# Patient Record
Sex: MALE | Race: WHITE | Employment: OTHER | URBAN - METROPOLITAN AREA
[De-identification: names, ages, dates, MRNs, and addresses within clinical notes are randomized per-mention and may not be internally consistent; named-entity substitution may affect disease eponyms.]

---

## 2017-02-17 ENCOUNTER — HOSPITAL ENCOUNTER (OUTPATIENT)
Dept: CT IMAGING | Age: 72
Discharge: HOME | End: 2017-02-17
Attending: INTERNAL MEDICINE | Admitting: INTERNAL MEDICINE

## 2017-02-17 DIAGNOSIS — C85.80 OTHER SPECIFIED TYPES OF NON-HODGKIN LYMPHOMA, UNSPECIFIED SITE (HCC): ICD-10-CM

## 2017-02-17 DIAGNOSIS — Z85.72 HISTORY OF NON-HODGKIN'S LYMPHOMA: ICD-10-CM

## 2017-02-17 LAB
A/G RATIO: 1.7 (ref 1.1–2.2)
ALBUMIN SERPL-MCNC: 4.3 G/DL (ref 3.4–5)
ALP BLD-CCNC: 111 U/L (ref 40–129)
ALT SERPL-CCNC: 42 U/L (ref 10–40)
ANION GAP SERPL CALCULATED.3IONS-SCNC: 11 MMOL/L (ref 3–16)
AST SERPL-CCNC: 29 U/L (ref 15–37)
BILIRUB SERPL-MCNC: 0.8 MG/DL (ref 0–1)
BUN BLDV-MCNC: 12 MG/DL (ref 7–20)
CALCIUM SERPL-MCNC: 9.4 MG/DL (ref 8.3–10.6)
CHLORIDE BLD-SCNC: 93 MMOL/L (ref 99–110)
CO2: 30 MMOL/L (ref 21–32)
CREAT SERPL-MCNC: 1 MG/DL (ref 0.8–1.3)
GFR AFRICAN AMERICAN: >60
GFR NON-AFRICAN AMERICAN: >60
GLOBULIN: 2.6 G/DL
GLUCOSE BLD-MCNC: 268 MG/DL (ref 70–99)
POTASSIUM SERPL-SCNC: 4.6 MMOL/L (ref 3.5–5.1)
SODIUM BLD-SCNC: 134 MMOL/L (ref 136–145)
TOTAL PROTEIN: 6.9 G/DL (ref 6.4–8.2)

## 2018-12-26 ENCOUNTER — HOSPITAL ENCOUNTER (EMERGENCY)
Age: 73
Discharge: HOME | End: 2018-12-26
Payer: MEDICARE

## 2018-12-26 ENCOUNTER — APPOINTMENT (OUTPATIENT)
Dept: GENERAL RADIOLOGY | Age: 73
End: 2018-12-26
Payer: MEDICARE

## 2018-12-26 VITALS
SYSTOLIC BLOOD PRESSURE: 147 MMHG | OXYGEN SATURATION: 96 % | HEIGHT: 70 IN | BODY MASS INDEX: 27.63 KG/M2 | TEMPERATURE: 98.5 F | HEART RATE: 92 BPM | RESPIRATION RATE: 16 BRPM | WEIGHT: 193 LBS | DIASTOLIC BLOOD PRESSURE: 77 MMHG

## 2018-12-26 DIAGNOSIS — T18.9XXA SWALLOWED FOREIGN BODY, INITIAL ENCOUNTER: Primary | ICD-10-CM

## 2018-12-26 PROCEDURE — 99283 EMERGENCY DEPT VISIT LOW MDM: CPT

## 2018-12-26 PROCEDURE — 74019 RADEX ABDOMEN 2 VIEWS: CPT

## 2018-12-26 RX ORDER — CLOPIDOGREL BISULFATE 75 MG/1
75 TABLET ORAL DAILY
COMMUNITY
End: 2022-09-14 | Stop reason: ALTCHOICE

## 2022-07-25 ENCOUNTER — HOSPITAL ENCOUNTER (EMERGENCY)
Age: 77
Discharge: HOME | End: 2022-07-25
Attending: STUDENT IN AN ORGANIZED HEALTH CARE EDUCATION/TRAINING PROGRAM
Payer: MEDICARE

## 2022-07-25 ENCOUNTER — APPOINTMENT (OUTPATIENT)
Dept: CT IMAGING | Age: 77
End: 2022-07-25
Payer: MEDICARE

## 2022-07-25 VITALS
WEIGHT: 193 LBS | DIASTOLIC BLOOD PRESSURE: 59 MMHG | RESPIRATION RATE: 17 BRPM | BODY MASS INDEX: 27.63 KG/M2 | TEMPERATURE: 97.8 F | HEIGHT: 70 IN | SYSTOLIC BLOOD PRESSURE: 130 MMHG | OXYGEN SATURATION: 96 % | HEART RATE: 76 BPM

## 2022-07-25 DIAGNOSIS — R19.7 DIARRHEA, UNSPECIFIED TYPE: ICD-10-CM

## 2022-07-25 DIAGNOSIS — R10.33 PERIUMBILICAL ABDOMINAL PAIN: Primary | ICD-10-CM

## 2022-07-25 DIAGNOSIS — K52.9 GASTROENTERITIS: ICD-10-CM

## 2022-07-25 LAB
A/G RATIO: 1.6 (ref 1.1–2.2)
ALBUMIN SERPL-MCNC: 4.4 G/DL (ref 3.4–5)
ALP BLD-CCNC: 92 U/L (ref 40–129)
ALT SERPL-CCNC: 19 U/L (ref 10–40)
ANION GAP SERPL CALCULATED.3IONS-SCNC: 9 MMOL/L (ref 3–16)
AST SERPL-CCNC: 22 U/L (ref 15–37)
BASOPHILS ABSOLUTE: 0 K/UL (ref 0–0.2)
BASOPHILS RELATIVE PERCENT: 0.4 %
BILIRUB SERPL-MCNC: 0.7 MG/DL (ref 0–1)
BILIRUBIN URINE: NEGATIVE
BLOOD, URINE: NEGATIVE
BUN BLDV-MCNC: 18 MG/DL (ref 7–20)
CALCIUM SERPL-MCNC: 9.3 MG/DL (ref 8.3–10.6)
CHLORIDE BLD-SCNC: 97 MMOL/L (ref 99–110)
CLARITY: CLEAR
CO2: 29 MMOL/L (ref 21–32)
COLOR: YELLOW
CREAT SERPL-MCNC: 0.8 MG/DL (ref 0.8–1.3)
EOSINOPHILS ABSOLUTE: 0.1 K/UL (ref 0–0.6)
EOSINOPHILS RELATIVE PERCENT: 0.8 %
GFR AFRICAN AMERICAN: >60
GFR NON-AFRICAN AMERICAN: >60
GLUCOSE BLD-MCNC: 154 MG/DL (ref 70–99)
GLUCOSE URINE: >=1000 MG/DL
HCT VFR BLD CALC: 49.9 % (ref 40.5–52.5)
HEMOGLOBIN: 16.9 G/DL (ref 13.5–17.5)
KETONES, URINE: NEGATIVE MG/DL
LEUKOCYTE ESTERASE, URINE: NEGATIVE
LIPASE: 32 U/L (ref 13–60)
LYMPHOCYTES ABSOLUTE: 0.2 K/UL (ref 1–5.1)
LYMPHOCYTES RELATIVE PERCENT: 2 %
MCH RBC QN AUTO: 31.6 PG (ref 26–34)
MCHC RBC AUTO-ENTMCNC: 33.8 G/DL (ref 31–36)
MCV RBC AUTO: 93.4 FL (ref 80–100)
MICROSCOPIC EXAMINATION: ABNORMAL
MONOCYTES ABSOLUTE: 0.9 K/UL (ref 0–1.3)
MONOCYTES RELATIVE PERCENT: 7.5 %
NEUTROPHILS ABSOLUTE: 10.7 K/UL (ref 1.7–7.7)
NEUTROPHILS RELATIVE PERCENT: 89.3 %
NITRITE, URINE: NEGATIVE
PDW BLD-RTO: 13.4 % (ref 12.4–15.4)
PH UA: 7 (ref 5–8)
PLATELET # BLD: 130 K/UL (ref 135–450)
PMV BLD AUTO: 8.1 FL (ref 5–10.5)
POTASSIUM SERPL-SCNC: 4.3 MMOL/L (ref 3.5–5.1)
PROTEIN UA: NEGATIVE MG/DL
RBC # BLD: 5.34 M/UL (ref 4.2–5.9)
SODIUM BLD-SCNC: 135 MMOL/L (ref 136–145)
SPECIFIC GRAVITY UA: <=1.005 (ref 1–1.03)
TOTAL PROTEIN: 7.1 G/DL (ref 6.4–8.2)
URINE TYPE: ABNORMAL
UROBILINOGEN, URINE: 0.2 E.U./DL
WBC # BLD: 11.9 K/UL (ref 4–11)

## 2022-07-25 PROCEDURE — 96374 THER/PROPH/DIAG INJ IV PUSH: CPT

## 2022-07-25 PROCEDURE — 83690 ASSAY OF LIPASE: CPT

## 2022-07-25 PROCEDURE — 36415 COLL VENOUS BLD VENIPUNCTURE: CPT

## 2022-07-25 PROCEDURE — 85025 COMPLETE CBC W/AUTO DIFF WBC: CPT

## 2022-07-25 PROCEDURE — 81003 URINALYSIS AUTO W/O SCOPE: CPT

## 2022-07-25 PROCEDURE — 6360000002 HC RX W HCPCS: Performed by: STUDENT IN AN ORGANIZED HEALTH CARE EDUCATION/TRAINING PROGRAM

## 2022-07-25 PROCEDURE — 74177 CT ABD & PELVIS W/CONTRAST: CPT

## 2022-07-25 PROCEDURE — 80053 COMPREHEN METABOLIC PANEL: CPT

## 2022-07-25 PROCEDURE — 99285 EMERGENCY DEPT VISIT HI MDM: CPT

## 2022-07-25 PROCEDURE — 6360000004 HC RX CONTRAST MEDICATION: Performed by: STUDENT IN AN ORGANIZED HEALTH CARE EDUCATION/TRAINING PROGRAM

## 2022-07-25 PROCEDURE — 96372 THER/PROPH/DIAG INJ SC/IM: CPT

## 2022-07-25 RX ORDER — ONDANSETRON 2 MG/ML
4 INJECTION INTRAMUSCULAR; INTRAVENOUS EVERY 6 HOURS PRN
Status: DISCONTINUED | OUTPATIENT
Start: 2022-07-25 | End: 2022-07-25 | Stop reason: HOSPADM

## 2022-07-25 RX ORDER — DICYCLOMINE HYDROCHLORIDE 10 MG/ML
20 INJECTION INTRAMUSCULAR ONCE
Status: COMPLETED | OUTPATIENT
Start: 2022-07-25 | End: 2022-07-25

## 2022-07-25 RX ORDER — ONDANSETRON 4 MG/1
4 TABLET, ORALLY DISINTEGRATING ORAL EVERY 8 HOURS PRN
Qty: 21 TABLET | Refills: 0 | Status: SHIPPED | OUTPATIENT
Start: 2022-07-25 | End: 2022-09-14 | Stop reason: ALTCHOICE

## 2022-07-25 RX ORDER — DICYCLOMINE HYDROCHLORIDE 10 MG/1
10 CAPSULE ORAL 4 TIMES DAILY
Qty: 120 CAPSULE | Refills: 3 | Status: SHIPPED | OUTPATIENT
Start: 2022-07-25 | End: 2022-09-14 | Stop reason: ALTCHOICE

## 2022-07-25 RX ADMIN — IOPAMIDOL 75 ML: 755 INJECTION, SOLUTION INTRAVENOUS at 02:19

## 2022-07-25 RX ADMIN — ONDANSETRON HYDROCHLORIDE 4 MG: 2 INJECTION, SOLUTION INTRAMUSCULAR; INTRAVENOUS at 01:38

## 2022-07-25 RX ADMIN — DICYCLOMINE HYDROCHLORIDE 20 MG: 20 INJECTION, SOLUTION INTRAMUSCULAR at 01:36

## 2022-07-25 ASSESSMENT — PAIN DESCRIPTION - PAIN TYPE: TYPE: ACUTE PAIN

## 2022-07-25 ASSESSMENT — PAIN DESCRIPTION - ORIENTATION: ORIENTATION: MID

## 2022-07-25 ASSESSMENT — PAIN DESCRIPTION - DESCRIPTORS: DESCRIPTORS: CRAMPING;DISCOMFORT

## 2022-07-25 ASSESSMENT — PAIN - FUNCTIONAL ASSESSMENT: PAIN_FUNCTIONAL_ASSESSMENT: 0-10

## 2022-07-25 ASSESSMENT — PAIN SCALES - GENERAL
PAINLEVEL_OUTOF10: 10
PAINLEVEL_OUTOF10: 7

## 2022-07-25 ASSESSMENT — PAIN DESCRIPTION - LOCATION
LOCATION: ABDOMEN
LOCATION: ABDOMEN

## 2022-07-25 NOTE — ED PROVIDER NOTES
[address] ED      CHIEF COMPLAINT  Diarrhea and abdominal pain       HISTORY OF PRESENT ILLNESS  Ash Galan is a 67 y.o. male with past medical history of diabetes, lymphoma in remission, coronary artery disease, and hyperlipidemia who presents to the ED complaining of periumbilical abdominal pain. Onset of pain: This evening, does report that he ate [employer] shortly prior to onset  Location: Periumbilical  Radiation: Denies  Quality: Aching sick feeling  Severity: 10/10 though patient appears comfortable on exam  Timing: Constant  Palliative Factors: Denies  Provocative Factors: Denies    Nausea/Vomiting: Nausea without vomiting  Diarrhea/Constipation: Profuse watery diarrhea  Dysuria/urinary frequency: Denies  Denies chest pain or shortness of breath    Old records reviewed: No pertinent information noted. No other complaints, modifying factors or associated symptoms. I have reviewed the following from the nursing documentation.     Past Medical History:   Diagnosis Date    CAD (coronary artery disease) 3/15/2013    Cancer ([facility] [address]     DDD (degenerative disc disease) 9/25/2012    Diabetes mellitus ([facility] [address]     Herniated disc     Hyperlipidemia 9/25/2012    Hypertension      Past Surgical History:   Procedure Laterality Date    CHOLECYSTECTOMY      COLONOSCOPY  8/13/2014    CORONARY ANGIOPLASTY WITH STENT PLACEMENT      OTHER SURGICAL HISTORY Right 3/3/2016    PORT REMOVAL      Family History   Problem Relation Age of Onset    Heart Disease Mother     Stroke Mother     Cancer Father      Social History     Socioeconomic History    Marital status:      Spouse name: Not on file    Number of children: Not on file    Years of education: Not on file    Highest education level: Not on file   Occupational History    Not on file   Tobacco Use    Smoking status: Former    Smokeless tobacco: Former   Substance and Sexual Activity    Alcohol use: No    Drug use: Never    Sexual activity: Not Currently   Other Topics Concern    Not on file   Social History Narrative    Not on file     Social Determinants of Health     Financial Resource Strain: Not on file   Food Insecurity: Not on file   Transportation Needs: Not on file   Physical Activity: Not on file   Stress: Not on file   Social Connections: Not on file   Intimate Partner Violence: Not on file   Housing Stability: Not on file     Current Facility-Administered Medications   Medication Dose Route Frequency Provider Last Rate Last Admin    ondansetron (ZOFRAN) injection 4 mg  4 mg IntraVENous Q6H PRN Tay Gonzales MD   4 mg at 07/25/22 0138     Current Outpatient Medications   Medication Sig Dispense Refill    dicyclomine (BENTYL) 10 MG capsule Take 1 capsule by mouth in the morning and 1 capsule at noon and 1 capsule in the evening and 1 capsule before bedtime. 120 capsule 3    ondansetron (ZOFRAN ODT) 4 MG disintegrating tablet Take 1 tablet by mouth every 8 hours as needed for Nausea or Vomiting 21 tablet 0    clopidogrel (PLAVIX) 75 MG tablet Take 75 mg by mouth daily      HYDROcodone-acetaminophen (NORCO) 5-325 MG per tablet Take 1-2 tablets by mouth every 6 hours as needed for Pain 20 tablet 0    aspirin 81 MG tablet Take 81 mg by mouth daily. fosinopril (MONOPRIL) 40 MG tablet Take 40 mg by mouth daily. metoprolol (LOPRESSOR) 50 MG tablet Take 50 mg by mouth 2 times daily. nitroGLYCERIN (NITROSTAT) 0.4 MG SL tablet Place 0.4 mg under the tongue every 5 minutes as needed. rosuvastatin (CRESTOR) 20 MG tablet Take 20 mg by mouth daily. 1/2 tab daily      insulin glargine (LANTUS) 100 UNIT/ML injection Inject 40 Units into the skin nightly. 1 Pen 3    fish oil-omega-3 fatty acids 1000 MG capsule Take 2 g by mouth daily. glipiZIDE (GLUCOTROL) 10 MG tablet Take 10 mg by mouth 2 times daily (before meals). metformin (GLUCOPHAGE) 1000 MG tablet Take 1,000 mg by mouth 2 times daily (with meals).          Allergies Allergen Reactions    Doxycycline Rash       REVIEW OF SYSTEMS  All systems reviewed, pertinent positives per HPI otherwise noted to be negative. PHYSICAL EXAM  /61   Pulse 76   Temp 97.8 °F (36.6 °C)   Resp 17   Ht 5' 10\" (1.778 m)   Wt 193 lb (87.5 kg)   SpO2 97%   BMI 27.69 kg/m²    GENERAL APPEARANCE: Awake and alert. Cooperative. no distress. HENT: Normocephalic. Atraumatic. Mucous membranes are moist  NECK: Supple. Full range of motion without pain or stiffness  EYES: PERRL. EOM's grossly intact. HEART/CHEST: RRR. No murmurs. LUNGS: Respirations unlabored. CTAB. Good air exchange. Speaking comfortably in full sentences. ABDOMEN: Mild diffuse tenderness soft. Non-distended. No masses. No organomegaly. No guarding or rebound. MUSCULOSKELETAL: No extremity edema. Compartments soft. No deformity. No tenderness in the extremities. All extremities neurovascularly intact. SKIN: Warm and dry. No acute rashes. NEUROLOGICAL: Alert and oriented. No gross facial drooping. Strength 5/5, sensation intact. PSYCHIATRIC: Normal mood and affect. LABS  I have reviewed all labs for this visit.    Results for orders placed or performed during the hospital encounter of 07/25/22   CBC with Auto Differential   Result Value Ref Range    WBC 11.9 (H) 4.0 - 11.0 K/uL    RBC 5.34 4.20 - 5.90 M/uL    Hemoglobin 16.9 13.5 - 17.5 g/dL    Hematocrit 49.9 40.5 - 52.5 %    MCV 93.4 80.0 - 100.0 fL    MCH 31.6 26.0 - 34.0 pg    MCHC 33.8 31.0 - 36.0 g/dL    RDW 13.4 12.4 - 15.4 %    Platelets 621 (L) 487 - 450 K/uL    MPV 8.1 5.0 - 10.5 fL    Neutrophils % 89.3 %    Lymphocytes % 2.0 %    Monocytes % 7.5 %    Eosinophils % 0.8 %    Basophils % 0.4 %    Neutrophils Absolute 10.7 (H) 1.7 - 7.7 K/uL    Lymphocytes Absolute 0.2 (L) 1.0 - 5.1 K/uL    Monocytes Absolute 0.9 0.0 - 1.3 K/uL    Eosinophils Absolute 0.1 0.0 - 0.6 K/uL    Basophils Absolute 0.0 0.0 - 0.2 K/uL   CMP   Result Value Ref Range    Sodium 135 (L) 136 - 145 mmol/L    Potassium 4.3 3.5 - 5.1 mmol/L    Chloride 97 (L) 99 - 110 mmol/L    CO2 29 21 - 32 mmol/L    Anion Gap 9 3 - 16    Glucose 154 (H) 70 - 99 mg/dL    BUN 18 7 - 20 mg/dL    Creatinine 0.8 0.8 - 1.3 mg/dL    GFR Non-African American >60 >60    GFR African American >60 >60    Calcium 9.3 8.3 - 10.6 mg/dL    Total Protein 7.1 6.4 - 8.2 g/dL    Albumin 4.4 3.4 - 5.0 g/dL    Albumin/Globulin Ratio 1.6 1.1 - 2.2    Total Bilirubin 0.7 0.0 - 1.0 mg/dL    Alkaline Phosphatase 92 40 - 129 U/L    ALT 19 10 - 40 U/L    AST 22 15 - 37 U/L   Lipase   Result Value Ref Range    Lipase 32.0 13.0 - 60.0 U/L   Urinalysis   Result Value Ref Range    Color, UA Yellow Straw/Yellow    Clarity, UA Clear Clear    Glucose, Ur >=1000 (A) Negative mg/dL    Bilirubin Urine Negative Negative    Ketones, Urine Negative Negative mg/dL    Specific Gravity, UA <=1.005 1.005 - 1.030    Blood, Urine Negative Negative    pH, UA 7.0 5.0 - 8.0    Protein, UA Negative Negative mg/dL    Urobilinogen, Urine 0.2 <2.0 E.U./dL    Nitrite, Urine Negative Negative    Leukocyte Esterase, Urine Negative Negative    Microscopic Examination Not Indicated     Urine Type NotGiven        RADIOLOGY  CT ABDOMEN PELVIS W IV CONTRAST Additional Contrast? None   Final Result   No acute abnormality identified. ED COURSE / MDM  Patient seen and evaluated. Old records reviewed and pertinent information included in HPI. Labs and imaging reviewed and results discussed with patient. Overall well appearing patient, in no acute distress, presenting for diarrhea and abdominal pain. Physical exam remarkable for generalized abdominal tenderness to palpation.      Differential diagnosis includes but is not limited to: gastroenteritis, peptic ulcer disease, cholecystitis, pancreatitis, hepatitis or other liver disease, low suspicion for Appendicitis, bowel obstruction,  diverticulitis, hernia,  UTI, AAA    Laboratory studies and imaging obtained. ED Course as of 07/25/22 0458   Mon Jul 25, 2022 0225 Mild leukocytosis 11.9. Thrombocytopenia to 130. No anemia [ER]   0226 Lipase within normal limits, low suspicion for pancreatitis [ER]   0226 Mild hyponatremia 135, hypochloremia 97. No other electrolyte abnormalities or evidence of kidney dysfunction. [ER]   0226 Liver function testing unremarkable, low suspicion for hepatitis or other acute liver pathology. [ER]   [phone] Patient reports improvement of symptoms after treatment in the emergency department [ER]   0248 CT abd/pelvis: IMPRESSION:  No acute abnormality identified. [ER]   0304 Patient reports improvement of symptoms after treatment in the emergency department. [ER]   0319 Urinalysis shows glucose but no evidence of blood or infection. [ER]      ED Course User Index  [ER] Arthur Kramer MD          During the patient's ED course, the patient was given:  Medications   ondansetron [facility]) injection 4 mg (4 mg IntraVENous Given 7/25/22 0138)   dicyclomine (BENTYL) injection 20 mg (20 mg IntraMUSCular Given 7/25/22 0136)   iopamidol (ISOVUE-370) 76 % injection 75 mL (75 mLs IntraVENous Given 7/25/22 0219)        Work-up overall reassuring. Suspect gastroenteritis. At this time, feel the patient is appropriate for discharge to follow-up with a primary care doctor. Patient feels comfortable with discharge at this time. Patient was provided with prescriptions for Bentyl and. Return precautions given. Encouraged PCP follow-up in 2 days. Patient discharged in stable condition. I estimate there is LOW risk for ACUTE APPENDICITIS, BOWEL OBSTRUCTION, ACUTE CHOLECYSTITIS, RUPTURED DIVERTICULITIS, INCARCERATED or STRANGULATED HERNIA, HEMMORHAGIC PANCREATITIS, PERFORATED BOWEL/ULCER, ECTOPIC PREGNANCY, OVARIAN TORSION or TUBO-OVARIAN ABSCESS thus I consider the discharge disposition reasonable.  Alex Wilkes and I have discussed the diagnosis and risks, and we agree with discharging home with close follow-up. We also discussed returning to the Emergency Department immediately if new or worsening symptoms occur. We have discussed the symptoms which are most concerning that necessitate immediate return. CLINICAL IMPRESSION  1. Periumbilical abdominal pain    2. Gastroenteritis    3. Diarrhea, unspecified type        Blood pressure (!) 130/59, pulse 76, temperature 97.8 °F (36.6 °C), resp. rate 17, height 5' 10\" (1.778 m), weight 193 lb (87.5 kg), SpO2 96 %. Nayeli Valdez was discharged to home in stable condition. Patient was given scripts for the following medications. I counseled patient how to take these medications. Discharge Medication List as of 7/25/2022  3:29 AM      Paulina Cooper    Follow-up with:  Wesley Lund MD  [address]  [address] [place] [address]  [phone]    Schedule an appointment as soon as possible for a visit in 2 days      [facility] ED  [address] [facility]  Go to   As needed, If symptoms worsen    DISCLAIMER: This chart was created using Dragon dictation software. Efforts were made by me to ensure accuracy, however some errors may be present due to limitations of this technology and occasionally words are not transcribed correctly.        Arthur Kramer MD  07/25/22 4455

## 2022-09-14 ENCOUNTER — APPOINTMENT (OUTPATIENT)
Dept: CT IMAGING | Age: 77
DRG: 378 | End: 2022-09-14
Payer: MEDICARE

## 2022-09-14 ENCOUNTER — HOSPITAL ENCOUNTER (INPATIENT)
Age: 77
LOS: 2 days | Discharge: ANOTHER HOSPITAL | DRG: 378 | End: 2022-09-16
Attending: STUDENT IN AN ORGANIZED HEALTH CARE EDUCATION/TRAINING PROGRAM | Admitting: INTERNAL MEDICINE
Payer: MEDICARE

## 2022-09-14 DIAGNOSIS — K57.90 DIVERTICULOSIS: ICD-10-CM

## 2022-09-14 DIAGNOSIS — K92.2 GASTROINTESTINAL HEMORRHAGE, UNSPECIFIED GASTROINTESTINAL HEMORRHAGE TYPE: Primary | ICD-10-CM

## 2022-09-14 DIAGNOSIS — J40 BRONCHITIS: ICD-10-CM

## 2022-09-14 LAB
A/G RATIO: 1.2 (ref 1.1–2.2)
ABO/RH: NORMAL
ALBUMIN SERPL-MCNC: 3.9 G/DL (ref 3.4–5)
ALP BLD-CCNC: 87 U/L (ref 40–129)
ALT SERPL-CCNC: 13 U/L (ref 10–40)
ANION GAP SERPL CALCULATED.3IONS-SCNC: 11 MMOL/L (ref 3–16)
ANTIBODY SCREEN: NORMAL
AST SERPL-CCNC: 17 U/L (ref 15–37)
BASOPHILS ABSOLUTE: 0 K/UL (ref 0–0.2)
BASOPHILS RELATIVE PERCENT: 0.5 %
BILIRUB SERPL-MCNC: 0.7 MG/DL (ref 0–1)
BILIRUBIN URINE: NEGATIVE
BLOOD, URINE: NEGATIVE
BUN BLDV-MCNC: 15 MG/DL (ref 7–20)
CALCIUM SERPL-MCNC: 9.1 MG/DL (ref 8.3–10.6)
CHLORIDE BLD-SCNC: 96 MMOL/L (ref 99–110)
CHP ED QC CHECK: NORMAL
CLARITY: CLEAR
CO2: 27 MMOL/L (ref 21–32)
COLOR: YELLOW
CREAT SERPL-MCNC: 1 MG/DL (ref 0.8–1.3)
EKG ATRIAL RATE: 80 BPM
EKG DIAGNOSIS: NORMAL
EKG P AXIS: 78 DEGREES
EKG P-R INTERVAL: 256 MS
EKG Q-T INTERVAL: 394 MS
EKG QRS DURATION: 136 MS
EKG QTC CALCULATION (BAZETT): 454 MS
EKG R AXIS: 139 DEGREES
EKG T AXIS: 73 DEGREES
EKG VENTRICULAR RATE: 80 BPM
EOSINOPHILS ABSOLUTE: 0.1 K/UL (ref 0–0.6)
EOSINOPHILS RELATIVE PERCENT: 1.1 %
GFR AFRICAN AMERICAN: >60
GFR NON-AFRICAN AMERICAN: >60
GLUCOSE BLD-MCNC: 228 MG/DL
GLUCOSE BLD-MCNC: 228 MG/DL (ref 70–99)
GLUCOSE BLD-MCNC: 296 MG/DL (ref 70–99)
GLUCOSE BLD-MCNC: 97 MG/DL (ref 70–99)
GLUCOSE URINE: >=1000 MG/DL
HCT VFR BLD CALC: 38.5 % (ref 40.5–52.5)
HCT VFR BLD CALC: 42.6 % (ref 40.5–52.5)
HEMOGLOBIN: 13.2 G/DL (ref 13.5–17.5)
HEMOGLOBIN: 14.9 G/DL (ref 13.5–17.5)
INR BLD: 1.15 (ref 0.87–1.14)
KETONES, URINE: NEGATIVE MG/DL
LACTIC ACID: 2.7 MMOL/L (ref 0.4–2)
LEUKOCYTE ESTERASE, URINE: NEGATIVE
LIPASE: 39 U/L (ref 13–60)
LYMPHOCYTES ABSOLUTE: 0.7 K/UL (ref 1–5.1)
LYMPHOCYTES RELATIVE PERCENT: 7.3 %
MCH RBC QN AUTO: 32.3 PG (ref 26–34)
MCHC RBC AUTO-ENTMCNC: 34.9 G/DL (ref 31–36)
MCV RBC AUTO: 92.6 FL (ref 80–100)
MICROSCOPIC EXAMINATION: ABNORMAL
MONOCYTES ABSOLUTE: 0.9 K/UL (ref 0–1.3)
MONOCYTES RELATIVE PERCENT: 9.8 %
NEUTROPHILS ABSOLUTE: 7.5 K/UL (ref 1.7–7.7)
NEUTROPHILS RELATIVE PERCENT: 81.3 %
NITRITE, URINE: NEGATIVE
PDW BLD-RTO: 12.9 % (ref 12.4–15.4)
PERFORMED ON: ABNORMAL
PERFORMED ON: NORMAL
PH UA: 7 (ref 5–8)
PLATELET # BLD: 215 K/UL (ref 135–450)
PMV BLD AUTO: 7.4 FL (ref 5–10.5)
POTASSIUM REFLEX MAGNESIUM: 4.4 MMOL/L (ref 3.5–5.1)
PROTEIN UA: NEGATIVE MG/DL
PROTHROMBIN TIME: 14.5 SEC (ref 11.7–14.5)
RBC # BLD: 4.6 M/UL (ref 4.2–5.9)
SARS-COV-2, NAAT: NOT DETECTED
SODIUM BLD-SCNC: 134 MMOL/L (ref 136–145)
SPECIFIC GRAVITY UA: <=1.005 (ref 1–1.03)
TOTAL PROTEIN: 7.2 G/DL (ref 6.4–8.2)
TROPONIN: <0.01 NG/ML
URINE REFLEX TO CULTURE: ABNORMAL
URINE TYPE: ABNORMAL
UROBILINOGEN, URINE: 0.2 E.U./DL
WBC # BLD: 9.2 K/UL (ref 4–11)

## 2022-09-14 PROCEDURE — 6360000004 HC RX CONTRAST MEDICATION: Performed by: STUDENT IN AN ORGANIZED HEALTH CARE EDUCATION/TRAINING PROGRAM

## 2022-09-14 PROCEDURE — 74174 CTA ABD&PLVS W/CONTRAST: CPT

## 2022-09-14 PROCEDURE — 87040 BLOOD CULTURE FOR BACTERIA: CPT

## 2022-09-14 PROCEDURE — 83690 ASSAY OF LIPASE: CPT

## 2022-09-14 PROCEDURE — 81003 URINALYSIS AUTO W/O SCOPE: CPT

## 2022-09-14 PROCEDURE — 80053 COMPREHEN METABOLIC PANEL: CPT

## 2022-09-14 PROCEDURE — 2580000003 HC RX 258: Performed by: STUDENT IN AN ORGANIZED HEALTH CARE EDUCATION/TRAINING PROGRAM

## 2022-09-14 PROCEDURE — 6360000002 HC RX W HCPCS: Performed by: STUDENT IN AN ORGANIZED HEALTH CARE EDUCATION/TRAINING PROGRAM

## 2022-09-14 PROCEDURE — 86901 BLOOD TYPING SEROLOGIC RH(D): CPT

## 2022-09-14 PROCEDURE — 51798 US URINE CAPACITY MEASURE: CPT

## 2022-09-14 PROCEDURE — 86900 BLOOD TYPING SEROLOGIC ABO: CPT

## 2022-09-14 PROCEDURE — 85014 HEMATOCRIT: CPT

## 2022-09-14 PROCEDURE — 86850 RBC ANTIBODY SCREEN: CPT

## 2022-09-14 PROCEDURE — 84484 ASSAY OF TROPONIN QUANT: CPT

## 2022-09-14 PROCEDURE — 87635 SARS-COV-2 COVID-19 AMP PRB: CPT

## 2022-09-14 PROCEDURE — 2060000000 HC ICU INTERMEDIATE R&B

## 2022-09-14 PROCEDURE — 70450 CT HEAD/BRAIN W/O DYE: CPT

## 2022-09-14 PROCEDURE — 85610 PROTHROMBIN TIME: CPT

## 2022-09-14 PROCEDURE — 93005 ELECTROCARDIOGRAM TRACING: CPT | Performed by: STUDENT IN AN ORGANIZED HEALTH CARE EDUCATION/TRAINING PROGRAM

## 2022-09-14 PROCEDURE — 6370000000 HC RX 637 (ALT 250 FOR IP): Performed by: INTERNAL MEDICINE

## 2022-09-14 PROCEDURE — 83036 HEMOGLOBIN GLYCOSYLATED A1C: CPT

## 2022-09-14 PROCEDURE — 83605 ASSAY OF LACTIC ACID: CPT

## 2022-09-14 PROCEDURE — 99285 EMERGENCY DEPT VISIT HI MDM: CPT

## 2022-09-14 PROCEDURE — 36415 COLL VENOUS BLD VENIPUNCTURE: CPT

## 2022-09-14 PROCEDURE — 96374 THER/PROPH/DIAG INJ IV PUSH: CPT

## 2022-09-14 PROCEDURE — 87150 DNA/RNA AMPLIFIED PROBE: CPT

## 2022-09-14 PROCEDURE — C9113 INJ PANTOPRAZOLE SODIUM, VIA: HCPCS | Performed by: STUDENT IN AN ORGANIZED HEALTH CARE EDUCATION/TRAINING PROGRAM

## 2022-09-14 PROCEDURE — 85018 HEMOGLOBIN: CPT

## 2022-09-14 PROCEDURE — 85025 COMPLETE CBC W/AUTO DIFF WBC: CPT

## 2022-09-14 RX ORDER — DEXTROSE MONOHYDRATE 100 MG/ML
INJECTION, SOLUTION INTRAVENOUS CONTINUOUS PRN
Status: DISCONTINUED | OUTPATIENT
Start: 2022-09-14 | End: 2022-09-16 | Stop reason: HOSPADM

## 2022-09-14 RX ORDER — ACETAMINOPHEN 650 MG/1
650 SUPPOSITORY RECTAL EVERY 6 HOURS PRN
Status: DISCONTINUED | OUTPATIENT
Start: 2022-09-14 | End: 2022-09-16 | Stop reason: HOSPADM

## 2022-09-14 RX ORDER — POLYETHYLENE GLYCOL 3350 17 G/17G
119 POWDER, FOR SOLUTION ORAL ONCE
Status: COMPLETED | OUTPATIENT
Start: 2022-09-15 | End: 2022-09-15

## 2022-09-14 RX ORDER — INSULIN LISPRO 100 [IU]/ML
0-4 INJECTION, SOLUTION INTRAVENOUS; SUBCUTANEOUS
Status: DISCONTINUED | OUTPATIENT
Start: 2022-09-14 | End: 2022-09-16 | Stop reason: HOSPADM

## 2022-09-14 RX ORDER — SODIUM CHLORIDE 9 MG/ML
INJECTION, SOLUTION INTRAVENOUS PRN
Status: DISCONTINUED | OUTPATIENT
Start: 2022-09-14 | End: 2022-09-16 | Stop reason: HOSPADM

## 2022-09-14 RX ORDER — SODIUM CHLORIDE 0.9 % (FLUSH) 0.9 %
5-40 SYRINGE (ML) INJECTION EVERY 12 HOURS SCHEDULED
Status: DISCONTINUED | OUTPATIENT
Start: 2022-09-14 | End: 2022-09-16 | Stop reason: HOSPADM

## 2022-09-14 RX ORDER — ONDANSETRON 2 MG/ML
4 INJECTION INTRAMUSCULAR; INTRAVENOUS EVERY 6 HOURS PRN
Status: DISCONTINUED | OUTPATIENT
Start: 2022-09-14 | End: 2022-09-16 | Stop reason: HOSPADM

## 2022-09-14 RX ORDER — ALOGLIPTIN 25 MG/1
25 TABLET, FILM COATED ORAL DAILY
COMMUNITY

## 2022-09-14 RX ORDER — M-VIT,TX,IRON,MINS/CALC/FOLIC 27MG-0.4MG
1 TABLET ORAL DAILY
COMMUNITY

## 2022-09-14 RX ORDER — 0.9 % SODIUM CHLORIDE 0.9 %
500 INTRAVENOUS SOLUTION INTRAVENOUS ONCE
Status: COMPLETED | OUTPATIENT
Start: 2022-09-14 | End: 2022-09-14

## 2022-09-14 RX ORDER — TAMSULOSIN HYDROCHLORIDE 0.4 MG/1
0.4 CAPSULE ORAL DAILY
Status: DISCONTINUED | OUTPATIENT
Start: 2022-09-14 | End: 2022-09-16 | Stop reason: HOSPADM

## 2022-09-14 RX ORDER — INSULIN LISPRO 100 [IU]/ML
0-4 INJECTION, SOLUTION INTRAVENOUS; SUBCUTANEOUS NIGHTLY
Status: DISCONTINUED | OUTPATIENT
Start: 2022-09-14 | End: 2022-09-16 | Stop reason: HOSPADM

## 2022-09-14 RX ORDER — MAGNESIUM OXIDE 400 MG/1
420 TABLET ORAL DAILY
COMMUNITY

## 2022-09-14 RX ORDER — POLYETHYLENE GLYCOL 3350 17 G/17G
119 POWDER, FOR SOLUTION ORAL ONCE
Status: COMPLETED | OUTPATIENT
Start: 2022-09-14 | End: 2022-09-14

## 2022-09-14 RX ORDER — SODIUM CHLORIDE 9 MG/ML
INJECTION, SOLUTION INTRAVENOUS CONTINUOUS
Status: DISCONTINUED | OUTPATIENT
Start: 2022-09-14 | End: 2022-09-15

## 2022-09-14 RX ORDER — ACETAMINOPHEN 325 MG/1
650 TABLET ORAL EVERY 6 HOURS PRN
Status: DISCONTINUED | OUTPATIENT
Start: 2022-09-14 | End: 2022-09-16 | Stop reason: HOSPADM

## 2022-09-14 RX ORDER — SODIUM CHLORIDE 0.9 % (FLUSH) 0.9 %
5-40 SYRINGE (ML) INJECTION PRN
Status: DISCONTINUED | OUTPATIENT
Start: 2022-09-14 | End: 2022-09-16 | Stop reason: HOSPADM

## 2022-09-14 RX ORDER — ASCORBIC ACID 1000 MG
1 TABLET ORAL DAILY
COMMUNITY

## 2022-09-14 RX ORDER — ASCORBIC ACID 500 MG
500 TABLET ORAL DAILY
COMMUNITY

## 2022-09-14 RX ORDER — SODIUM CHLORIDE, SODIUM LACTATE, POTASSIUM CHLORIDE, AND CALCIUM CHLORIDE .6; .31; .03; .02 G/100ML; G/100ML; G/100ML; G/100ML
1000 INJECTION, SOLUTION INTRAVENOUS ONCE
Status: COMPLETED | OUTPATIENT
Start: 2022-09-14 | End: 2022-09-14

## 2022-09-14 RX ORDER — POLYETHYLENE GLYCOL 3350 17 G/17G
17 POWDER, FOR SOLUTION ORAL DAILY PRN
Status: DISCONTINUED | OUTPATIENT
Start: 2022-09-14 | End: 2022-09-16 | Stop reason: HOSPADM

## 2022-09-14 RX ORDER — ASCORBIC ACID 500 MG
500 TABLET ORAL DAILY
Status: DISCONTINUED | OUTPATIENT
Start: 2022-09-14 | End: 2022-09-16 | Stop reason: HOSPADM

## 2022-09-14 RX ORDER — INSULIN GLARGINE 100 [IU]/ML
10 INJECTION, SOLUTION SUBCUTANEOUS NIGHTLY
Status: DISCONTINUED | OUTPATIENT
Start: 2022-09-14 | End: 2022-09-16 | Stop reason: HOSPADM

## 2022-09-14 RX ORDER — ONDANSETRON 4 MG/1
4 TABLET, ORALLY DISINTEGRATING ORAL EVERY 8 HOURS PRN
Status: DISCONTINUED | OUTPATIENT
Start: 2022-09-14 | End: 2022-09-16 | Stop reason: HOSPADM

## 2022-09-14 RX ORDER — PANTOPRAZOLE SODIUM 40 MG/10ML
80 INJECTION, POWDER, LYOPHILIZED, FOR SOLUTION INTRAVENOUS ONCE
Status: COMPLETED | OUTPATIENT
Start: 2022-09-14 | End: 2022-09-14

## 2022-09-14 RX ADMIN — POLYETHYLENE GLYCOL 3350 119 G: 17 POWDER, FOR SOLUTION ORAL at 19:08

## 2022-09-14 RX ADMIN — IOPAMIDOL 100 ML: 755 INJECTION, SOLUTION INTRAVENOUS at 15:59

## 2022-09-14 RX ADMIN — SODIUM CHLORIDE 500 ML: 9 INJECTION, SOLUTION INTRAVENOUS at 15:36

## 2022-09-14 RX ADMIN — PANTOPRAZOLE SODIUM 80 MG: 40 INJECTION, POWDER, LYOPHILIZED, FOR SOLUTION INTRAVENOUS at 15:37

## 2022-09-14 RX ADMIN — SODIUM CHLORIDE, POTASSIUM CHLORIDE, SODIUM LACTATE AND CALCIUM CHLORIDE 1000 ML: 600; 310; 30; 20 INJECTION, SOLUTION INTRAVENOUS at 16:38

## 2022-09-14 ASSESSMENT — PAIN DESCRIPTION - LOCATION: LOCATION: ABDOMEN

## 2022-09-14 ASSESSMENT — PAIN SCALES - GENERAL: PAINLEVEL_OUTOF10: 3

## 2022-09-14 ASSESSMENT — PAIN - FUNCTIONAL ASSESSMENT
PAIN_FUNCTIONAL_ASSESSMENT: NONE - DENIES PAIN
PAIN_FUNCTIONAL_ASSESSMENT: 0-10

## 2022-09-14 NOTE — ED NOTES
7949- Call placed to     1711- Dr. Olga Khan returned the call and spoke to Dr. Hipolito Sears.      [facility]  09/14/22 [address] [place] [address]  09/14/22 1095

## 2022-09-14 NOTE — PLAN OF CARE
GI bleed. 41-year-old male with diabetes mellitus hypertension and history of lymphoma in remission; heart presenting with bright of blood per rectum. Multiple episodes of rectal bleeding at least 6-7 times today. Malcolmil Medardo Episode of rectal bleeding in the ED with hypotension and diaphoresis, blood pressures improved with IV fluids. .  Vital signs currently stable in the ED, hemoglobin stable at 14.9..  GI consulted CTA of abdomen and pelvis showed diverticulosis; no acute bleed. Plan for colonoscopy in a.m. .  Admit to PCU, telemetry monitoring, check H&H every 6 hours, IV fluids, PPI. History of diabetes, resume Lantus at a lower dose 10 units nightly only, and sliding scale insulin. .  Hold aspirin, hold antihypertensive medicines

## 2022-09-14 NOTE — ED PROVIDER NOTES
[address] ED      CHIEF COMPLAINT  Rectal bleeding       HISTORY OF PRESENT ILLNESS  Mateo Sebastian is a 67 y.o. male with past medical history of coronary artery disease on Plavix, lymphoma in remission, diabetes, hyperlipidemia who presents to the ED complaining of lower abdominal pain and bright red blood per rectum. Onset of pain: Approximately 5 days  Location: Generalized  Radiation: Denies  Quality: Cramping aching  Severity: Moderate  Timing: Intermittent  Palliative Factors: Denies  Provocative Factors: Denies    Nausea/Vomiting: Denies  Diarrhea/Constipation: Patient has had increasing episodes of bright red blood per rectum, has had 6 episodes today  Dysuria/urinary frequency: Denies  Fever: Denies, patient does report that he was recently treated for infection. He was seen in July and diagnosed with gastroenteritis, no antibiotics at that time. I do see outpatient he was started on azithromycin for URI symptoms  Previous abdominal surgeries: Cholecystectomy    Patient had a large bloody bowel movement in the emergency department, he then became diaphoretic, his blood pressure was in the 80s, and he appeared unwell. He did quickly improve though he does state that he feels unwell, while I was in the room he had a brief episode of syncope. No associated symptoms. No seizure-like activity    Old records reviewed:   CT Abd/Pelvis: 7/25/22  Impression   No acute abnormality identified. No other complaints, modifying factors or associated symptoms. I have reviewed the following from the nursing documentation.     Past Medical History:   Diagnosis Date    CAD (coronary artery disease) 3/15/2013    Cancer ([facility] [address]     DDD (degenerative disc disease) 9/25/2012    Diabetes mellitus ([facility] [address]     Herniated disc     Hyperlipidemia 9/25/2012    Hypertension      Past Surgical History:   Procedure Laterality Date    CHOLECYSTECTOMY      COLONOSCOPY  8/13/2014    CORONARY ANGIOPLASTY WITH STENT PLACEMENT      OTHER SURGICAL HISTORY Right 3/3/2016    PORT REMOVAL      Family History   Problem Relation Age of Onset    Heart Disease Mother     Stroke Mother     Cancer Father      Social History     Socioeconomic History    Marital status:      Spouse name: Not on file    Number of children: Not on file    Years of education: Not on file    Highest education level: Not on file   Occupational History    Not on file   Tobacco Use    Smoking status: Former    Smokeless tobacco: Former   Substance and Sexual Activity    Alcohol use: No    Drug use: Never    Sexual activity: Not Currently   Other Topics Concern    Not on file   Social History Narrative    Not on file     Social Determinants of Health     Financial Resource Strain: Not on file   Food Insecurity: Not on file   Transportation Needs: Not on file   Physical Activity: Not on file   Stress: Not on file   Social Connections: Not on file   Intimate Partner Violence: Not on file   Housing Stability: Not on file     Current Facility-Administered Medications   Medication Dose Route Frequency Provider Last Rate Last Admin    0.9 % sodium chloride bolus  500 mL IntraVENous Once Bedelia MD Collins         Current Outpatient Medications   Medication Sig Dispense Refill    dicyclomine (BENTYL) 10 MG capsule Take 1 capsule by mouth in the morning and 1 capsule at noon and 1 capsule in the evening and 1 capsule before bedtime. 120 capsule 3    ondansetron (ZOFRAN ODT) 4 MG disintegrating tablet Take 1 tablet by mouth every 8 hours as needed for Nausea or Vomiting 21 tablet 0    clopidogrel (PLAVIX) 75 MG tablet Take 75 mg by mouth daily      HYDROcodone-acetaminophen (NORCO) 5-325 MG per tablet Take 1-2 tablets by mouth every 6 hours as needed for Pain 20 tablet 0    aspirin 81 MG tablet Take 81 mg by mouth daily. fosinopril (MONOPRIL) 40 MG tablet Take 40 mg by mouth daily. metoprolol (LOPRESSOR) 50 MG tablet Take 50 mg by mouth 2 times daily. nitroGLYCERIN (NITROSTAT) 0.4 MG SL tablet Place 0.4 mg under the tongue every 5 minutes as needed. rosuvastatin (CRESTOR) 20 MG tablet Take 20 mg by mouth daily. 1/2 tab daily      insulin glargine (LANTUS) 100 UNIT/ML injection Inject 40 Units into the skin nightly. 1 Pen 3    fish oil-omega-3 fatty acids 1000 MG capsule Take 2 g by mouth daily. glipiZIDE (GLUCOTROL) 10 MG tablet Take 10 mg by mouth 2 times daily (before meals). metformin (GLUCOPHAGE) 1000 MG tablet Take 1,000 mg by mouth 2 times daily (with meals). Allergies   Allergen Reactions    Doxycycline Rash       REVIEW OF SYSTEMS  All systems reviewed, pertinent positives per HPI otherwise noted to be negative. PHYSICAL EXAM  BP (!) 94/59   Pulse 77   Temp 98.5 °F (36.9 °C) (Oral)   Resp 18   Ht 5' 10\" (1.778 m)   Wt 172 lb (78 kg)   SpO2 92%   BMI 24.68 kg/m²    GENERAL APPEARANCE: Awake and alert. Cooperative. HENT: Normocephalic. Atraumatic. Mucous membranes are moist  NECK: Supple. Full range of motion without pain or stiffness  EYES: PERRL. EOM's grossly intact. HEART/CHEST: RRR. No murmurs. LUNGS: Respirations unlabored. CTAB. Good air exchange. Speaking comfortably in full sentences. ABDOMEN: Tender diffusely. Soft. Non-distended. No masses. No organomegaly. No guarding or rebound. RECTAL: Chaperone present. No evidence of bleeding external hemorrhoids, bright red blood noted on pad  MUSCULOSKELETAL: No extremity edema. Compartments soft. No deformity. No tenderness in the extremities. All extremities neurovascularly intact. SKIN: Warm and dry. No acute rashes. NEUROLOGICAL: Alert and oriented. No gross facial drooping. Strength 5/5, sensation intact. PSYCHIATRIC: Normal mood and affect. LABS  I have reviewed all labs for this visit.    Results for orders placed or performed during the hospital encounter of 09/14/22   CBC with Auto Differential   Result Value Ref Range    WBC 9.2 4.0 - 11.0 K/uL    RBC 4.60 4.20 - 5.90 M/uL    Hemoglobin 14.9 13.5 - 17.5 g/dL    Hematocrit 42.6 40.5 - 52.5 %    MCV 92.6 80.0 - 100.0 fL    MCH 32.3 26.0 - 34.0 pg    MCHC 34.9 31.0 - 36.0 g/dL    RDW 12.9 12.4 - 15.4 %    Platelets 804 830 - 219 K/uL    MPV 7.4 5.0 - 10.5 fL    Neutrophils % 81.3 %    Lymphocytes % 7.3 %    Monocytes % 9.8 %    Eosinophils % 1.1 %    Basophils % 0.5 %    Neutrophils Absolute 7.5 1.7 - 7.7 K/uL    Lymphocytes Absolute 0.7 (L) 1.0 - 5.1 K/uL    Monocytes Absolute 0.9 0.0 - 1.3 K/uL    Eosinophils Absolute 0.1 0.0 - 0.6 K/uL    Basophils Absolute 0.0 0.0 - 0.2 K/uL   CMP w/ Reflex to MG   Result Value Ref Range    Sodium 134 (L) 136 - 145 mmol/L    Potassium reflex Magnesium 4.4 3.5 - 5.1 mmol/L    Chloride 96 (L) 99 - 110 mmol/L    CO2 27 21 - 32 mmol/L    Anion Gap 11 3 - 16    Glucose 296 (H) 70 - 99 mg/dL    BUN 15 7 - 20 mg/dL    Creatinine 1.0 0.8 - 1.3 mg/dL    GFR Non-African American >60 >60    GFR African American >60 >60    Calcium 9.1 8.3 - 10.6 mg/dL    Total Protein 7.2 6.4 - 8.2 g/dL    Albumin 3.9 3.4 - 5.0 g/dL    Albumin/Globulin Ratio 1.2 1.1 - 2.2    Total Bilirubin 0.7 0.0 - 1.0 mg/dL    Alkaline Phosphatase 87 40 - 129 U/L    ALT 13 10 - 40 U/L    AST 17 15 - 37 U/L   Lactic Acid   Result Value Ref Range    Lactic Acid 2.7 (H) 0.4 - 2.0 mmol/L   Lipase   Result Value Ref Range    Lipase 39.0 13.0 - 60.0 U/L   Urinalysis with Reflex to Culture    Specimen: Urine   Result Value Ref Range    Color, UA Yellow Straw/Yellow    Clarity, UA Clear Clear    Glucose, Ur >=1000 (A) Negative mg/dL    Bilirubin Urine Negative Negative    Ketones, Urine Negative Negative mg/dL    Specific Gravity, UA <=1.005 1.005 - 1.030    Blood, Urine Negative Negative    pH, UA 7.0 5.0 - 8.0    Protein, UA Negative Negative mg/dL    Urobilinogen, Urine 0.2 <2.0 E.U./dL    Nitrite, Urine Negative Negative    Leukocyte Esterase, Urine Negative Negative    Microscopic Examination Not Indicated Urine Type NotGiven     Urine Reflex to Culture Not Indicated    Troponin   Result Value Ref Range    Troponin <0.01 <0.01 ng/mL   Protime-INR   Result Value Ref Range    Protime 14.5 11.7 - 14.5 sec    INR 1.15 (H) 0.87 - 1.14   POCT glucose   Result Value Ref Range    Glucose 228 mg/dL    QC OK? ok    POCT Glucose   Result Value Ref Range    POC Glucose 228 (H) 70 - 99 mg/dl    Performed on ACCU-CHEK    TYPE AND SCREEN   Result Value Ref Range    ABO/Rh O POS     Antibody Screen NEG        ECG  The Ekg interpreted by me shows  normal sinus rhythm with a rate of 80  Axis is   Normal  QTc is   prolonged to 454  Intervals and Durations are unremarkable. ST Segments: nonspecific changes, patient has a bifascicular block  Significant change from prior EKG dated 3/3/16    RADIOLOGY  CTA ABDOMEN PELVIS W WO CONTRAST   Final Result   1. No evidence of active extravasation into the gastrointestinal tract during   the evaluation to suggest active gastrointestinal bleeding. 2. No evidence of bowel obstruction or perforation. Diverticulosis worse in   the sigmoid but no clear evidence of acute diverticulitis. Small bowel   appears unremarkable. 3. Distention of the urinary bladder. 4. No evidence of aortic aneurysm or dissection. Mild atherosclerotic   disease. CT HEAD WO CONTRAST   Final Result   1. No acute intracranial hemorrhage. 2. Mild global cortical atrophy with moderate chronic microvascular ischemic   changes. 3. Chronic sinusitis. ED COURSE / MDM  Patient seen and evaluated. Old records reviewed and pertinent information included in HPI. Labs and imaging reviewed and results discussed with patient. Overall, pale appearing patient presenting for abdominal pain and bright red blood per rectum. Physical exam remarkable for generalized abdominal tenderness to palpation, bright red blood per rectum.     Differential diagnosis includes but is not limited to: Diverticular bleed, mesenteric ischemia, appendicitis, bowel obstruction,  diverticulitis, hernia, UTI, AAA, gastroenteritis, peptic ulcer disease, cholecystitis, pancreatitis, hepatitis or other liver disease    Laboratory studies and imaging obtained. During the patient's ED course, the patient was given:  Medications   lactated ringers bolus (1,000 mLs IntraVENous New Bag 9/14/22 1638)   bisacodyl (DULCOLAX) EC tablet 20 mg (has no administration in time range)   polyethylene glycol (GLYCOLAX) powder 119 g (has no administration in time range)   polyethylene glycol (GLYCOLAX) powder 119 g (has no administration in time range)   polyethylene glycol (GLYCOLAX) packet 17 g (has no administration in time range)   0.9 % sodium chloride bolus ( IntraVENous Canceled Entry 9/14/22 1542)   pantoprazole (PROTONIX) injection 80 mg (80 mg IntraVENous Given 9/14/22 1537)   iopamidol (ISOVUE-370) 76 % injection 100 mL (100 mLs IntraVENous Given 9/14/22 1559)        ED Course as of 09/14/22 1803   Wed Sep 14, 2022   1543 No leukocytosis, anemia, thrombocytopenia [ER]   1544 Lactate is elevated to 2.7, plan to obtain CTA to assess for obvious signs of bleeding as well as mesenteric ischemia. Patient is receiving fluids. Patient does not meet SIRS criteria [ER]   1544 Hyperglycemia to 228 [ER]   1545 Mild hyponatremia 134, hypochloremia 96. No other electrolyte abnormalities or evidence of kidney dysfunction. [ER]   1545 Liver function testing unremarkable [ER]   1547 Troponin within normal limits, EKG without evidence of acute ischemia. [ER]   1549 Lipase within normal limits, low suspicion for pancreatitis [ER]   1549 Spoke to gastroenterology. With reassuring hemoglobin stabilizing vital signs, will complete further evaluation including CTA [ER]   1634 CT Head: IMPRESSION:  1. No acute intracranial hemorrhage. 2. Mild global cortical atrophy with moderate chronic microvascular ischemic  changes. 3. Chronic sinusitis.  [ER]   5525 Coagulation studies show mildly elevated INR [ER]   1652 CTA Abd/Pelvis w wo contrast: IMPRESSION:  1. No evidence of active extravasation into the gastrointestinal tract during the evaluation to suggest active gastrointestinal bleeding. 2. No evidence of bowel obstruction or perforation. Diverticulosis worse in  the sigmoid but no clear evidence of acute diverticulitis. Small bowel appears unremarkable. 3. Distention of the urinary bladder. 4. No evidence of aortic aneurysm or dissection. Mild atherosclerotic disease.  -------  CT scan shows no evidence of acute bleeding, mesenteric ischemia, colitis. Does show evidence of diverticulosis [ER]   1712 Closed-loop with Dr. Matty Looc of gastroenterology. They will plan to do colonoscopy tomorrow [ER]      ED Course User Index  [ER] Christelle Calderon MD      Work-up remarkable for reassuring hemoglobin and imaging. Plan to admit the patient for colonoscopy tomorrow. Will need to trend hemoglobin in case current hemoglobin has not reflected the results of patient's bleeding. Patient is currently hemodynamically stable. The cause of the hypotension and syncope on arrival could be vasovagal episode. EKG shows no evidence of significant ischemia or arrhythmia, troponin within normal limits. Patient denies any associated chest pain or shortness of breath. At this time, do feel the patient requires admission for further work-up and management. Discussed the patient with hospital team, Dr Maria R Gotti, patient to be admitted to [facility]. I spent a total of 25 minutes of critical care time in obtaining history, performing a physical exam, bedside monitoring of interventions, collecting and interpreting tests and discussion with consultants but not including time spent performing procedures. Clinical Concern GI bleed, hypotension, elevated lactate, syncopal event    Intervention fluids, consultation with gastroenterology      CLINICAL IMPRESSION  1.  Gastrointestinal hemorrhage, unspecified gastrointestinal hemorrhage type    2. Diverticulosis        Blood pressure 121/67, pulse 73, temperature 98.5 °F (36.9 °C), temperature source Oral, resp. rate 18, height 5' 10\" (1.778 m), weight 172 lb (78 kg), SpO2 99 %. Nayeli Valdez was admitted in stable condition. DISCLAIMER: This chart was created using Dragon dictation software. Efforts were made by me to ensure accuracy, however some errors may be present due to limitations of this technology and occasionally words are not transcribed correctly.        Thea Mcmillan MD  09/14/22 8162

## 2022-09-14 NOTE — PROGRESS NOTES
Pt. Complaining of pressure in belly. Pt. Bladder scanned show greater than 999 ml. Pt. Able to void but only able to go 200 ml. Dr. Ahn Comes called and informed new order for terrell received at this time.

## 2022-09-14 NOTE — CONSULTS
Gastroenterology Consult Note    Patient:   Tavo Singh   :    1945   Facility:   [address]  Referring/PCP: Cass Ron MD  Date:     2022  Consultant:   Shira Garnett PA-C      Chief Complaint   Patient presents with    Rectal Bleeding     6 episodes of bloody stool today. Pt arrives a/o. Got off the cot and used the bedside camode. Large bloody stool. Pt became pale and altered stating he didn't feel; right. Pale and diaphoretic. History of Present illness     67year old male with a history of DM, HTN, HLD, DDD, CAD on Plavix, s/p cholecystectomy, and lymphoma in remission presented to the ED with rectal bleeding. He first noticed BRBPR and rectal bleeding on Monday. He had 3-4x episodes of BRBPR yesterday, and 5-6x episodes of BRBPR thus far today. He admits to bloating, lower abdominal pain, cramping, foul smelling flautus, weakness, fatigue, hypotension, and dizziness. He denies melena, nausea, vomiting, dysphagia, heartburn, weight loss, and decreased appetite. He denies recent medication changes, alcohol, tobacco, marijuana, illicit substance, Tylenol, and Ibuprofen use. His last colonoscopy in 3/2019 showed diverticulosis and benign colon bx. He admits to family history of colon polyps in his brother and sister. He has never had an EGD. He presented to the ED on 22 for evaluation of diarrhea and abdominal pain. He was diagnosed with gastroenteritis, and discharged with Bentyl and Zofran. GI was consulted for evaluation of rectal bleeding.  CTA abd/pelvis shows diverticulosis, otherwise normal.      Past Medical History:   Diagnosis Date    CAD (coronary artery disease) 3/15/2013    Cancer ([address]     DDD (degenerative disc disease) 2012    Diabetes mellitus ([facility] [address]     Herniated disc     Hyperlipidemia 2012    Hypertension      Past Surgical History:   Procedure Laterality Date    CHOLECYSTECTOMY      COLONOSCOPY  2014    CORONARY ANGIOPLASTY WITH STENT PLACEMENT      OTHER SURGICAL HISTORY Right 3/3/2016    PORT REMOVAL        Social:   Social History     Tobacco Use    Smoking status: Former    Smokeless tobacco: Former   Substance Use Topics    Alcohol use: No     Family:   Family History   Problem Relation Age of Onset    Heart Disease Mother     Stroke Mother     Cancer Father      No current facility-administered medications on file prior to encounter. Current Outpatient Medications on File Prior to Encounter   Medication Sig Dispense Refill    dicyclomine (BENTYL) 10 MG capsule Take 1 capsule by mouth in the morning and 1 capsule at noon and 1 capsule in the evening and 1 capsule before bedtime. 120 capsule 3    ondansetron (ZOFRAN ODT) 4 MG disintegrating tablet Take 1 tablet by mouth every 8 hours as needed for Nausea or Vomiting 21 tablet 0    clopidogrel (PLAVIX) 75 MG tablet Take 75 mg by mouth daily      HYDROcodone-acetaminophen (NORCO) 5-325 MG per tablet Take 1-2 tablets by mouth every 6 hours as needed for Pain 20 tablet 0    aspirin 81 MG tablet Take 81 mg by mouth daily. fosinopril (MONOPRIL) 40 MG tablet Take 40 mg by mouth daily. metoprolol (LOPRESSOR) 50 MG tablet Take 50 mg by mouth 2 times daily. nitroGLYCERIN (NITROSTAT) 0.4 MG SL tablet Place 0.4 mg under the tongue every 5 minutes as needed. rosuvastatin (CRESTOR) 20 MG tablet Take 20 mg by mouth daily. 1/2 tab daily      insulin glargine (LANTUS) 100 UNIT/ML injection Inject 40 Units into the skin nightly. 1 Pen 3    fish oil-omega-3 fatty acids 1000 MG capsule Take 2 g by mouth daily. glipiZIDE (GLUCOTROL) 10 MG tablet Take 10 mg by mouth 2 times daily (before meals). metformin (GLUCOPHAGE) 1000 MG tablet Take 1,000 mg by mouth 2 times daily (with meals). Infusions:   PRN Medications: Allergies: Allergies   Allergen Reactions    Doxycycline Rash       ROS:   Constitutional: negative for chills, fevers and sweats. Positive for weakness, fatigue. Eyes: negative for cataracts, icterus and redness  Ears, nose, mouth, throat, and face: negative for epistaxis, hearing loss and sore throat  Respiratory: negative for cough, hemoptysis and sputum  Cardiovascular: negative for chest pain, dyspnea and lower extremity edema  Gastrointestinal: as per HPI  Genitourinary:negative for dysuria, frequency and hematuria  Neurological: negative for coordination problems, and gait problems. Positive for dizziness. Behavioral/Psych: negative for anxiety, depression and mood swings    Physical Exam   BP (!) 142/69   Pulse 81   Temp 98.5 °F (36.9 °C) (Oral)   Resp 20   Ht 5' 10\" (1.778 m)   Wt 172 lb (78 kg)   SpO2 98%   BMI 24.68 kg/m²       General appearance: alert, appears stated age, cooperative, and mild distress  Head: Normocephalic, without obvious abnormality, atraumatic  Eyes: conjunctivae/corneas clear. PERRL, EOM's intact. Fundi benign. Neck: supple, symmetrical, trachea midline  Lungs: clear to auscultation bilaterally  Heart: regular rate and rhythm, S1, S2 normal, no murmur, click, rub or gallop  Abdomen: normal findings: no masses palpable, soft, non-tender, and symmetric and abnormal findings:  distended, hypoactive bowel sounds, and obese  Extremities: extremities normal, atraumatic, no cyanosis or edema    Lab and Imaging Review   Labs:  CBC:   Recent Labs     09/14/22  1513   WBC 9.2   HGB 14.9   HCT 42.6   MCV 92.6        BMP:   Recent Labs     09/14/22  1513   *   K 4.4   CL 96*   CO2 27   BUN 15   CREATININE 1.0     LIVER PROFILE:   Recent Labs     09/14/22  1513   AST 17   ALT 13   LIPASE 39.0   PROT 7.2   BILITOT 0.7   ALKPHOS 87     PT/INR:   Recent Labs     09/14/22  1513   INR 1.15*       IMAGING:  CTA ABDOMEN PELVIS W WO CONTRAST   Preliminary Result   1. No evidence of active extravasation into the gastrointestinal tract during   the evaluation to suggest active gastrointestinal bleeding.    2. No evidence of bowel obstruction or perforation. Diverticulosis worse in   the sigmoid but no clear evidence of acute diverticulitis. Small bowel   appears unremarkable. 3. Distention of the urinary bladder. 4. No evidence of aortic aneurysm or dissection. Mild atherosclerotic   disease. CT HEAD WO CONTRAST   Preliminary Result   1. No acute intracranial hemorrhage. 2. Mild global cortical atrophy with moderate chronic microvascular ischemic   changes. 3. Chronic sinusitis. CT ABDOMEN PELVIS W IV CONTRAST - 7/25/2022  Impression   No acute abnormality identified. Attending Supervising [de-identified] Attestation Statement  The patient is a 67 y.o. male. I have performed a history and physical examination of the patient. I discussed the case with my physician assistant Douglas Bajwa PA-C    I reviewed the patient's Past Medical History, Past Surgical History, Medications, and Allergies. Physical Exam:  Vitals:    09/14/22 1638 09/14/22 1746 09/14/22 1843 09/14/22 1900   BP: (!) 142/69 121/67 126/61 123/68   Pulse: 81 73 75 72   Resp: 20 18 16 16   Temp:    98 °F (36.7 °C)   TempSrc:    Oral   SpO2: 98% 99% 97% 97%   Weight:       Height:           Physical Examination: General appearance - ill-appearing  Mental status - alert and oriented  Eyes - sclera anicteric  Neck - supple, no significant adenopathy  Chest - no tachypnea, retractions or cyanosis  Heart - normal rate and regular rhythm  Abdomen - no rebound tenderness noted  Extremities - no pedal edema noted          Assessment:     67year old male with a history of DM, HTN, HLD, DDD, CAD on Plavix, s/p cholecystectomy, and lymphoma in remission admitted with BRBPR and diarrhea concerning for diverticular bleed, AVMs, hemorrhoid bleeding, vs ischemic colitis.       Plan:   Continue supportive care  Monitor Hgb  Observe for signs of bleeding  Monitor and document output  Recommend Pantoprazole 40 mg qAM  Bowel regimen with probiotics daily  Clear liquid diet  Begin bowel prep for colonoscopy tomorrow  NPO after 0500 AM tomorrow   Will follow      Dennis Bain PA-C  4:53 PM 9/14/2022                      67year old male with history of HTN, HLD, DM, CAD, DDD, and lymphoma s/p chemoXRT admitted with rectal bleeding, likely diverticulosis but cannot exclude colitis    Continue supportive care. Monitor Hgb. Observe for signs of bleeding.  Colonoscopy tomorrow    Brennon Sofia MD          O) [phone]  [phone]

## 2022-09-14 NOTE — ED NOTES
1531- Call placed to [address] [place] [address] ( whos on call for [facility])      [phone]- call was returned from Dr. Jose Lake  09/14/22 7238 [facility]  09/14/22 [id number]

## 2022-09-14 NOTE — ED NOTES
1659- Perfect serve sent to [facility]    [phone]- Call was returned by Dr. Stephanie Barrios  And spoke to Dr. Jose Iverson  09/14/22 [address]  09/14/22 [phone]

## 2022-09-15 ENCOUNTER — ANESTHESIA EVENT (OUTPATIENT)
Dept: ENDOSCOPY | Age: 77
DRG: 378 | End: 2022-09-15
Payer: MEDICARE

## 2022-09-15 ENCOUNTER — ANESTHESIA (OUTPATIENT)
Dept: ENDOSCOPY | Age: 77
DRG: 378 | End: 2022-09-15
Payer: MEDICARE

## 2022-09-15 LAB
APTT: 38.4 SEC (ref 23–34.3)
ESTIMATED AVERAGE GLUCOSE: 148.5 MG/DL
GLUCOSE BLD-MCNC: 103 MG/DL (ref 70–99)
GLUCOSE BLD-MCNC: 104 MG/DL (ref 70–99)
GLUCOSE BLD-MCNC: 104 MG/DL (ref 70–99)
GLUCOSE BLD-MCNC: 221 MG/DL (ref 70–99)
GLUCOSE BLD-MCNC: 98 MG/DL (ref 70–99)
HBA1C MFR BLD: 6.8 %
HCT VFR BLD CALC: 36.2 % (ref 40.5–52.5)
HCT VFR BLD CALC: 38.5 % (ref 40.5–52.5)
HCT VFR BLD CALC: 38.6 % (ref 40.5–52.5)
HCT VFR BLD CALC: 39.1 % (ref 40.5–52.5)
HEMOGLOBIN: 12.6 G/DL (ref 13.5–17.5)
HEMOGLOBIN: 13.1 G/DL (ref 13.5–17.5)
HEMOGLOBIN: 13.2 G/DL (ref 13.5–17.5)
HEMOGLOBIN: 13.3 G/DL (ref 13.5–17.5)
INR BLD: 1.11 (ref 0.87–1.14)
INR BLD: >16.29 (ref 0.87–1.14)
IRON SATURATION: 25 % (ref 20–50)
IRON: 54 UG/DL (ref 59–158)
LACTIC ACID: 1.3 MMOL/L (ref 0.4–2)
LACTIC ACID: 1.3 MMOL/L (ref 0.4–2)
PERFORMED ON: ABNORMAL
PERFORMED ON: NORMAL
PROTHROMBIN TIME: 14.1 SEC (ref 11.7–14.5)
PROTHROMBIN TIME: >120 SEC (ref 11.7–14.5)
REPORT: NORMAL
TOTAL IRON BINDING CAPACITY: 218 UG/DL (ref 260–445)
TROPONIN: <0.01 NG/ML
TROPONIN: <0.01 NG/ML

## 2022-09-15 PROCEDURE — 85730 THROMBOPLASTIN TIME PARTIAL: CPT

## 2022-09-15 PROCEDURE — 2720000010 HC SURG SUPPLY STERILE: Performed by: INTERNAL MEDICINE

## 2022-09-15 PROCEDURE — 99153 MOD SED SAME PHYS/QHP EA: CPT | Performed by: INTERNAL MEDICINE

## 2022-09-15 PROCEDURE — 7100000011 HC PHASE II RECOVERY - ADDTL 15 MIN: Performed by: INTERNAL MEDICINE

## 2022-09-15 PROCEDURE — 2580000003 HC RX 258: Performed by: INTERNAL MEDICINE

## 2022-09-15 PROCEDURE — 0W3P8ZZ CONTROL BLEEDING IN GASTROINTESTINAL TRACT, VIA NATURAL OR ARTIFICIAL OPENING ENDOSCOPIC: ICD-10-PCS | Performed by: INTERNAL MEDICINE

## 2022-09-15 PROCEDURE — C9113 INJ PANTOPRAZOLE SODIUM, VIA: HCPCS | Performed by: INTERNAL MEDICINE

## 2022-09-15 PROCEDURE — 2060000000 HC ICU INTERMEDIATE R&B

## 2022-09-15 PROCEDURE — 84484 ASSAY OF TROPONIN QUANT: CPT

## 2022-09-15 PROCEDURE — 85610 PROTHROMBIN TIME: CPT

## 2022-09-15 PROCEDURE — 83540 ASSAY OF IRON: CPT

## 2022-09-15 PROCEDURE — 6370000000 HC RX 637 (ALT 250 FOR IP): Performed by: INTERNAL MEDICINE

## 2022-09-15 PROCEDURE — 36415 COLL VENOUS BLD VENIPUNCTURE: CPT

## 2022-09-15 PROCEDURE — 99223 1ST HOSP IP/OBS HIGH 75: CPT | Performed by: INTERNAL MEDICINE

## 2022-09-15 PROCEDURE — 2700000000 HC OXYGEN THERAPY PER DAY

## 2022-09-15 PROCEDURE — 2500000003 HC RX 250 WO HCPCS: Performed by: NURSE ANESTHETIST, CERTIFIED REGISTERED

## 2022-09-15 PROCEDURE — 85018 HEMOGLOBIN: CPT

## 2022-09-15 PROCEDURE — 83550 IRON BINDING TEST: CPT

## 2022-09-15 PROCEDURE — 3609009900 HC COLONOSCOPY W/CONTROL BLEEDING ANY METHOD: Performed by: INTERNAL MEDICINE

## 2022-09-15 PROCEDURE — 99152 MOD SED SAME PHYS/QHP 5/>YRS: CPT | Performed by: INTERNAL MEDICINE

## 2022-09-15 PROCEDURE — 99222 1ST HOSP IP/OBS MODERATE 55: CPT | Performed by: INTERNAL MEDICINE

## 2022-09-15 PROCEDURE — 83605 ASSAY OF LACTIC ACID: CPT

## 2022-09-15 PROCEDURE — 85014 HEMATOCRIT: CPT

## 2022-09-15 PROCEDURE — 94761 N-INVAS EAR/PLS OXIMETRY MLT: CPT

## 2022-09-15 PROCEDURE — 2709999900 HC NON-CHARGEABLE SUPPLY: Performed by: INTERNAL MEDICINE

## 2022-09-15 PROCEDURE — 2580000003 HC RX 258: Performed by: ANESTHESIOLOGY

## 2022-09-15 PROCEDURE — 7100000010 HC PHASE II RECOVERY - FIRST 15 MIN: Performed by: INTERNAL MEDICINE

## 2022-09-15 PROCEDURE — 6360000002 HC RX W HCPCS: Performed by: INTERNAL MEDICINE

## 2022-09-15 PROCEDURE — 6360000002 HC RX W HCPCS: Performed by: NURSE ANESTHETIST, CERTIFIED REGISTERED

## 2022-09-15 RX ORDER — ONDANSETRON 2 MG/ML
INJECTION INTRAMUSCULAR; INTRAVENOUS PRN
Status: DISCONTINUED | OUTPATIENT
Start: 2022-09-15 | End: 2022-09-15 | Stop reason: SDUPTHER

## 2022-09-15 RX ORDER — POLYETHYLENE GLYCOL 3350 17 G/17G
119 POWDER, FOR SOLUTION ORAL ONCE
Status: COMPLETED | OUTPATIENT
Start: 2022-09-15 | End: 2022-09-15

## 2022-09-15 RX ORDER — SODIUM CHLORIDE 9 MG/ML
25 INJECTION, SOLUTION INTRAVENOUS PRN
Status: DISCONTINUED | OUTPATIENT
Start: 2022-09-15 | End: 2022-09-16 | Stop reason: HOSPADM

## 2022-09-15 RX ORDER — SODIUM CHLORIDE 0.9 % (FLUSH) 0.9 %
5-40 SYRINGE (ML) INJECTION PRN
Status: DISCONTINUED | OUTPATIENT
Start: 2022-09-15 | End: 2022-09-16 | Stop reason: HOSPADM

## 2022-09-15 RX ORDER — ONDANSETRON 2 MG/ML
4 INJECTION INTRAMUSCULAR; INTRAVENOUS
Status: DISCONTINUED | OUTPATIENT
Start: 2022-09-15 | End: 2022-09-15 | Stop reason: HOSPADM

## 2022-09-15 RX ORDER — MEPERIDINE HYDROCHLORIDE 25 MG/ML
12.5 INJECTION INTRAMUSCULAR; INTRAVENOUS; SUBCUTANEOUS EVERY 5 MIN PRN
Status: DISCONTINUED | OUTPATIENT
Start: 2022-09-15 | End: 2022-09-16 | Stop reason: HOSPADM

## 2022-09-15 RX ORDER — OXYCODONE HYDROCHLORIDE 5 MG/1
5 TABLET ORAL PRN
Status: DISCONTINUED | OUTPATIENT
Start: 2022-09-15 | End: 2022-09-15 | Stop reason: HOSPADM

## 2022-09-15 RX ORDER — LIDOCAINE HYDROCHLORIDE 10 MG/ML
INJECTION, SOLUTION INFILTRATION; PERINEURAL PRN
Status: DISCONTINUED | OUTPATIENT
Start: 2022-09-15 | End: 2022-09-15 | Stop reason: SDUPTHER

## 2022-09-15 RX ORDER — OXYCODONE HYDROCHLORIDE 5 MG/1
10 TABLET ORAL PRN
Status: DISCONTINUED | OUTPATIENT
Start: 2022-09-15 | End: 2022-09-15 | Stop reason: HOSPADM

## 2022-09-15 RX ORDER — PROPOFOL 10 MG/ML
INJECTION, EMULSION INTRAVENOUS PRN
Status: DISCONTINUED | OUTPATIENT
Start: 2022-09-15 | End: 2022-09-15 | Stop reason: SDUPTHER

## 2022-09-15 RX ORDER — AZITHROMYCIN 250 MG/1
250 TABLET, FILM COATED ORAL DAILY
Status: DISCONTINUED | OUTPATIENT
Start: 2022-09-15 | End: 2022-09-16 | Stop reason: HOSPADM

## 2022-09-15 RX ORDER — SODIUM CHLORIDE 0.9 % (FLUSH) 0.9 %
5-40 SYRINGE (ML) INJECTION EVERY 12 HOURS SCHEDULED
Status: DISCONTINUED | OUTPATIENT
Start: 2022-09-15 | End: 2022-09-16 | Stop reason: HOSPADM

## 2022-09-15 RX ADMIN — SODIUM CHLORIDE, PRESERVATIVE FREE 10 ML: 5 INJECTION INTRAVENOUS at 20:57

## 2022-09-15 RX ADMIN — PROPOFOL 20 MG: 10 INJECTION, EMULSION INTRAVENOUS at 13:13

## 2022-09-15 RX ADMIN — ONDANSETRON HYDROCHLORIDE 4 MG: 2 INJECTION, SOLUTION INTRAMUSCULAR; INTRAVENOUS at 13:24

## 2022-09-15 RX ADMIN — PROPOFOL 20 MG: 10 INJECTION, EMULSION INTRAVENOUS at 13:19

## 2022-09-15 RX ADMIN — Medication 40 MG: at 20:56

## 2022-09-15 RX ADMIN — Medication 10 ML: at 20:55

## 2022-09-15 RX ADMIN — PROPOFOL 20 MG: 10 INJECTION, EMULSION INTRAVENOUS at 13:23

## 2022-09-15 RX ADMIN — OXYCODONE HYDROCHLORIDE AND ACETAMINOPHEN 500 MG: 500 TABLET ORAL at 08:25

## 2022-09-15 RX ADMIN — LIDOCAINE HYDROCHLORIDE 50 MG: 10 INJECTION, SOLUTION INFILTRATION; PERINEURAL at 13:10

## 2022-09-15 RX ADMIN — PROPOFOL 20 MG: 10 INJECTION, EMULSION INTRAVENOUS at 13:18

## 2022-09-15 RX ADMIN — TAMSULOSIN HYDROCHLORIDE 0.4 MG: 0.4 CAPSULE ORAL at 08:25

## 2022-09-15 RX ADMIN — PROPOFOL 20 MG: 10 INJECTION, EMULSION INTRAVENOUS at 13:14

## 2022-09-15 RX ADMIN — SODIUM CHLORIDE: 9 INJECTION, SOLUTION INTRAVENOUS at 08:06

## 2022-09-15 RX ADMIN — PROPOFOL 20 MG: 10 INJECTION, EMULSION INTRAVENOUS at 13:09

## 2022-09-15 RX ADMIN — AZITHROMYCIN 250 MG: 250 TABLET, FILM COATED ORAL at 16:30

## 2022-09-15 RX ADMIN — LIDOCAINE HYDROCHLORIDE 50 MG: 10 INJECTION, SOLUTION INFILTRATION; PERINEURAL at 13:07

## 2022-09-15 RX ADMIN — PROPOFOL 20 MG: 10 INJECTION, EMULSION INTRAVENOUS at 13:11

## 2022-09-15 RX ADMIN — PROPOFOL 20 MG: 10 INJECTION, EMULSION INTRAVENOUS at 13:16

## 2022-09-15 RX ADMIN — PROPOFOL 10 MG: 10 INJECTION, EMULSION INTRAVENOUS at 13:25

## 2022-09-15 RX ADMIN — POLYETHYLENE GLYCOL (3350) 119 G: 17 POWDER, FOR SOLUTION ORAL at 08:25

## 2022-09-15 RX ADMIN — POLYETHYLENE GLYCOL 3350 119 G: 17 POWDER, FOR SOLUTION ORAL at 02:30

## 2022-09-15 RX ADMIN — PANTOPRAZOLE SODIUM 80 MG: 40 INJECTION, POWDER, LYOPHILIZED, FOR SOLUTION INTRAVENOUS at 20:50

## 2022-09-15 RX ADMIN — INSULIN GLARGINE 10 UNITS: 100 INJECTION, SOLUTION SUBCUTANEOUS at 20:51

## 2022-09-15 RX ADMIN — SODIUM CHLORIDE: 9 INJECTION, SOLUTION INTRAVENOUS at 00:04

## 2022-09-15 RX ADMIN — PROPOFOL 20 MG: 10 INJECTION, EMULSION INTRAVENOUS at 13:21

## 2022-09-15 RX ADMIN — PROPOFOL 40 MG: 10 INJECTION, EMULSION INTRAVENOUS at 13:07

## 2022-09-15 RX ADMIN — Medication 10 ML: at 08:26

## 2022-09-15 ASSESSMENT — LIFESTYLE VARIABLES: SMOKING_STATUS: 0

## 2022-09-15 ASSESSMENT — PAIN SCALES - GENERAL: PAINLEVEL_OUTOF10: 0

## 2022-09-15 ASSESSMENT — PAIN DESCRIPTION - FREQUENCY: FREQUENCY: OTHER (COMMENT)

## 2022-09-15 ASSESSMENT — PAIN DESCRIPTION - PAIN TYPE: TYPE: OTHER (COMMENT)

## 2022-09-15 ASSESSMENT — PAIN DESCRIPTION - ONSET: ONSET: OTHER (COMMENT)

## 2022-09-15 ASSESSMENT — PAIN DESCRIPTION - LOCATION: LOCATION: OTHER (COMMENT)

## 2022-09-15 ASSESSMENT — PAIN DESCRIPTION - DESCRIPTORS: DESCRIPTORS: OTHER (COMMENT)

## 2022-09-15 ASSESSMENT — PAIN DESCRIPTION - ORIENTATION: ORIENTATION: OTHER (COMMENT)

## 2022-09-15 ASSESSMENT — ENCOUNTER SYMPTOMS: SHORTNESS OF BREATH: 0

## 2022-09-15 NOTE — PROGRESS NOTES
Patient admitted to room 328 from ED. Patient oriented to room, call light, bed rails, phone, lights and bathroom. Patient instructed about the schedule of the day including: vital sign frequency, lab draws, possible tests, frequency of MD and staff rounds, daily weights, I &O's and prescribed diet. Telemetry box in place, patient aware of placement and reason. Bed locked, in lowest position, side rails up 2/4, call light within reach. Recliner Assessment  Patient is able to demonstrate the ability to move from a reclining position to an upright position within the recliner. 4 Eyes Skin Assessment     The patient is being assess for   Admission    I agree that 2 RN's have performed a thorough Head to Toe Skin Assessment on the patient. ALL assessment sites listed below have been assessed. Areas assessed for pressure by both nurses:   [x]   Head, Face, and Ears   [x]   Shoulders, Back, and Chest, Abdomen  [x]   Arms, Elbows, and Hands   [x]   Coccyx, Sacrum, and Ischium  [x]   Legs, Feet, and Heels        Skin Assessed Under all Medical Devices by both nurses:  terrell     Psoriasis patches to bilateral elbows, L ear, L knee and other scattered areas. .  Ecchymosis/psoriasis patches to R hip. **SHARE this note so that the co-signing nurse is able to place an eSignature**    Co-signer eSignature: Electronically signed by Effie Casanova RN on 9/15/22 at 7:04 AM EDT    Does the Patient have Skin Breakdown related to pressure?   No            Tim Prevention initiated:  NA   Wound Care Orders initiated:  NA      [facility] nurse consulted for Pressure Injury (Stage 3,4, Unstageable, DTI, NWPT, Complex wounds)and New or Established Ostomies:  NA      Primary Nurse eSignature: Electronically signed by Maricruz Avelar RN on 9/15/22 at 7:03 AM EDT

## 2022-09-15 NOTE — ANESTHESIA POSTPROCEDURE EVALUATION
Department of Anesthesiology  Postprocedure Note    Patient: Marysol Small  MRN: [id number]  YOB: 1945  Date of evaluation: 9/15/2022      Procedure Summary     Date: 09/15/22 Room / Location: [address] [place] [address] / [facility]    Anesthesia Start: 1300 Anesthesia Stop: 5301    Procedure: COLONOSCOPY CONTROL HEMORRHAGE Diagnosis:       Rectal bleeding      (RECTAL BLEEDING)    Surgeons: Kameron Patterson MD Responsible Provider: Marbin Garcia MD    Anesthesia Type: TIVA ASA Status: 3          Anesthesia Type: No value filed.     Zelda Phase I: Zelda Score: 10    Zelda Phase II: Zelda Score: 10  Vitals:    09/15/22 0232 09/15/22 0808 09/15/22 1248 09/15/22 1332   BP: (!) 140/76 (!) 154/74 (!) 142/67 (!) 107/47   Pulse: 80 93 99 95   Resp: 20 18 16 12   Temp: 97.9 °F (36.6 °C) 98.6 °F (37 °C) 98.4 °F (36.9 °C) 97.8 °F (36.6 °C)   TempSrc: Oral Oral Infrared Temporal   SpO2: 94% 94% 97% 98%   Weight: 183 lb 12.8 oz (83.4 kg)      Height:           Anesthesia Post Evaluation    Patient location during evaluation: bedside  Patient participation: complete - patient participated  Level of consciousness: awake and alert  Airway patency: patent  Nausea & Vomiting: no nausea  Complications: no  Cardiovascular status: hemodynamically stable  Respiratory status: acceptable  Hydration status: euvolemic

## 2022-09-15 NOTE — FLOWSHEET NOTE
09/15/22 0808   Vital Signs   Temp 98.6 °F (37 °C)   Temp Source Oral   Heart Rate 93   Resp 18   BP (!) 154/74   BP Location Right upper arm   MAP (Calculated) 100.67   Level of Consciousness 0   MEWS Score 1   Oxygen Therapy   SpO2 94 %   O2 Device None (Room air)   Pt resting in bed. PT and INR lab ordered for recheck. Pt call light and bedside table within reach. Shift assessment added.

## 2022-09-15 NOTE — CARE COORDINATION
Case Management Assessment  Initial Evaluation      Patient Name: Aldo Rich  YOB: 1945  Diagnosis: Diverticulosis [K57.90]  GI bleed [K92.2]  Gastrointestinal hemorrhage, unspecified gastrointestinal hemorrhage type [K92.2]  Date / Time: 9/14/2022  2:46 PM    Admission status/Date:  09/14/2022  Chart Reviewed: Yes      Patient Interviewed: Yes   Family Interviewed:  No      Hospitalization in the last 30 days:  No      Health Care Decision Maker :   Primary Decision Maker: Javier Lombardo - Spouse - [phone]    (CM - must 1st enter selection under Navigator - emergency contact- [place] Relationship and pick relationship)   Who do you trust or have selected to make healthcare decisions for you    [facility] Medicare  Precert required for SNF : YES      3 night stay required - NA    ADLS  Support Systems/Care Needs: Spouse/Significant Other, Children, [employer]  Transportation: self    Meal Preparation: self    Housing  Living Arrangements: lives w/sp  Steps: NA, amb w/o AD  Intent for return to present living arrangements: Yes  Identified Issues: NA    [address] with [address] : No Agency:(Services)  [facility] Care Services: [facility]  Passport/Waiver : No  :                      Phone Number:    Passport/Waiver Services: NA          Durable Medical Equiptment   DME Provider: [facility]  Equipment:   Walker___Cane__X_RTS___ BSC___Shower Chair___Hospital Bed___W/C____Other________  02 at ____Liter(s)---wears(frequency)_______ HHN ___ CPAP___ BiPap___   N/A____      Home O2 Use :  No      Informed of need for care provider to bring portable home O2 tank on day of discharge for nursing to connect prior to leaving:   Not Indicated  Verbalized agreement/Understanding:   Not Indicated    Community Service Affiliation  Dialysis:  No    Agency:  Location:  Dialysis Schedule:  Phone:   Fax:     Other Community Services: (ex:PT/OT,Mental Health,Wound Clinic, Cardio/Pul [address])    DISCHARGE PLAN: Explained Case Management role/services. Chart reviewed. Met with pt at bedside and explained the role of the CM. IPTA. Denies any needs. Will have C-Scope today.  +CM following

## 2022-09-15 NOTE — H&P
Hospital Medicine History & Physical        PCP: Winnie Chapman MD    Date of Admission: 9/14/2022    Date of Service: Pt seen/examined on 9/15/2022     Chief Complaint:    Chief Complaint   Patient presents with    Rectal Bleeding     6 episodes of bloody stool today. Pt arrives a/o. Got off the cot and used the bedside camode. Large bloody stool. Pt became pale and altered stating he didn't feel; right. Pale and diaphoretic. History Of Present Illness: The patient is a 67 y.o. male with pmhx of CAD, DM, HTN, HLD, lymphoma in remission who presented to [address] [place] [address] ED with complaint of abdominal pain and bright red blood per rectum. Patient describes multiple episodes of rectal bleeding yesterday, at least 6-7 times yesterday. He had an abdominal cramp associated with a bloody BM. No nausea or vomiting. No fevers. Episode of rectal bleeding on arrival to the  ED with hypotension and diaphoresis, blood pressures improved with IV fluids  Vital signs remained stable in the ED, hemoglobin stable at 14.9.->  Patient admitted to telemetry floor. GI consulted and plans for colonoscopy in a.m. CTA of abdomen and pelvis in ED did not show any acute bleed, showed diverticulosis,     Overnight vital signs remained stable, patient has not had any more rectal bleeding, he complains of more abdominal bloating and discomfort today from colon prep. Blood pressure and hemoglobin remained stable  Patient has EKG changes with new bundle branch block, he states that he has had previous coronary stent, denies any chest pain or shortness of breath today.        Past Medical History:        Diagnosis Date    CAD (coronary artery disease) 3/15/2013    Cancer ([address]     DDD (degenerative disc disease) 9/25/2012    Diabetes mellitus ([facility]     Herniated disc     Hyperlipidemia 9/25/2012    Hypertension        Past Surgical History:        Procedure Laterality Date    CHOLECYSTECTOMY      COLONOSCOPY  8/13/2014 CORONARY ANGIOPLASTY WITH STENT PLACEMENT      OTHER SURGICAL HISTORY Right 3/3/2016    PORT REMOVAL        Medications Prior to Admission:    Prior to Admission medications    Medication Sig Start Date End Date Taking? Authorizing Provider   alogliptin (NESINA) 25 MG TABS tablet Take 25 mg by mouth daily   Yes Historical Provider, MD   diclofenac sodium (VOLTAREN) 1 % GEL Apply topically 4 times daily as needed for Pain   Yes Historical Provider, MD   empagliflozin (JARDIANCE) 25 MG tablet Take 25 mg by mouth daily   Yes Historical Provider, MD   Multiple Vitamins-Minerals (THERAPEUTIC MULTIVITAMIN-MINERALS) tablet Take 1 tablet by mouth daily   Yes Historical Provider, MD   ascorbic acid (VITAMIN C) 500 MG tablet Take 500 mg by mouth daily   Yes Historical Provider, MD   Coenzyme Q10 (CO Q 10) 10 MG CAPS Take 1 capsule by mouth daily   Yes Historical Provider, MD   magnesium oxide (MAG-OX) 400 MG tablet Take 420 mg by mouth daily   Yes Historical Provider, MD   aspirin 81 MG tablet Take 81 mg by mouth daily. Historical Provider, MD   fosinopril (MONOPRIL) 40 MG tablet Take 40 mg by mouth daily. Historical Provider, MD   nitroGLYCERIN (NITROSTAT) 0.4 MG SL tablet Place 0.4 mg under the tongue every 5 minutes as needed. Historical Provider, MD   rosuvastatin (CRESTOR) 20 MG tablet Take 20 mg by mouth daily    Historical Provider, MD   insulin glargine (LANTUS) 100 UNIT/ML injection Inject 40 Units into the skin nightly. Patient taking differently: Inject 35 Units into the skin nightly 9/25/12   Nithya Brannon MD   fish oil-omega-3 fatty acids 1000 MG capsule Take 1 g by mouth daily 2capsule in the morning. One at night    Historical Provider, MD   metFORMIN (GLUCOPHAGE) 1000 MG tablet Take 1,000 mg by mouth 2 times daily (with meals). Historical Provider, MD       Allergies:  Doxycycline    Social History:  The patient currently lives at home    TOBACCO:   reports that he has quit smoking.  He has quit using smokeless tobacco.  ETOH:   reports no history of alcohol use. Family History:   Positive as follows:        Problem Relation Age of Onset    Heart Disease Mother     Stroke Mother     Cancer Father        REVIEW OF SYSTEMS:     Constitutional: Negative for fever   HENT: Negative for sore throat   Eyes: Negative for redness   Respiratory: Negative  for dyspnea, cough   Cardiovascular: Negative for chest pain   Gastrointestinal: Negative for vomiting.  +  rectal bleeding , abdominal cramps  Genitourinary: Negative for hematuria   Musculoskeletal: Negative for arthralgias   Skin: Negative for rash   Neurological: Negative for syncope   Hematological: Negative for adenopathy   Psychiatric/Behavorial: Negative for anxiety        PHYSICAL EXAM:    BP (!) 154/74   Pulse 93   Temp 98.6 °F (37 °C) (Oral)   Resp 18   Ht 5' 10\" (1.778 m)   Wt 183 lb 12.8 oz (83.4 kg)   SpO2 94%   BMI 26.37 kg/m²   Gen: No distress. Alert. Awake and well-oriented  Eyes: PERRL. No sclera icterus. No conjunctival injection. ENT: No discharge. Pharynx clear. Neck: No JVD. No Carotid Bruit. Trachea midline. Resp: No accessory muscle use. No crackles. No wheezes. No rhonchi. CV: Regular rate. Regular rhythm. No murmur. No rub. No edema. Capillary Refill: Brisk,< 3 seconds   Peripheral Pulses: +2 palpable, equal bilaterally   GI: Non-tender. Mildly distended. No masses. No organomegaly. Normal bowel sounds. No hernia. Skin: Warm and dry. No nodule on exposed extremities. No rash on exposed extremities. M/S: No cyanosis. No joint deformity. No clubbing. Neuro: Awake. Grossly nonfocal    Psych: Oriented x 3. No anxiety or agitation.      Lab Results   Component Value Date    WBC 9.2 09/14/2022    HGB 13.1 (L) 09/15/2022    HCT 39.1 (L) 09/15/2022    MCV 92.6 09/14/2022     09/14/2022     Lab Results   Component Value Date     (L) 09/14/2022    K 4.4 09/14/2022    CL 96 (L) 09/14/2022    CO2 27 09/14/2022    BUN 15 09/14/2022    CREATININE 1.0 09/14/2022    GLUCOSE 228 09/14/2022    CALCIUM 9.1 09/14/2022    PROT 7.2 09/14/2022    LABALBU 3.9 09/14/2022    BILITOT 0.7 09/14/2022    ALKPHOS 87 09/14/2022    AST 17 09/14/2022    ALT 13 09/14/2022    LABGLOM >60 09/14/2022    GFRAA >60 09/14/2022    AGRATIO 1.2 09/14/2022    GLOB 2.6 02/17/2017       CARDIAC ENZYMES  Recent Labs     09/14/22  1513   TROPONINI <0.01       U/A:    Lab Results   Component Value Date/Time    NITRITE neg 10/13/2011 12:20 PM    COLORU Yellow 09/14/2022 05:40 PM    CLARITYU Clear 09/14/2022 05:40 PM    SPECGRAV <=1.005 09/14/2022 05:40 PM    LEUKOCYTESUR Negative 09/14/2022 05:40 PM    BLOODU Negative 09/14/2022 05:40 PM    GLUCOSEU >=1000 09/14/2022 05:40 PM       CULTURES  COVID not detected   Blood cultures pending     EKG:  Sinus rhythm with 1st degree A-V blockRight bundle branch blockLeft posterior fascicular block Bifascicular block Abnormal ECGWhen compared with ECG of 03-MAR-2016 10:46,(RBBB and left posterior fascicular block) is now PresentST & T wave abnormality inferior leads has resolved. Confirmed by Cassandra Cheek ([id number]) on 9/14/2022 6:13:21 PM      RADIOLOGY  CTA ABDOMEN PELVIS W WO CONTRAST   Final Result   1. No evidence of active extravasation into the gastrointestinal tract during   the evaluation to suggest active gastrointestinal bleeding. 2. No evidence of bowel obstruction or perforation. Diverticulosis worse in   the sigmoid but no clear evidence of acute diverticulitis. Small bowel   appears unremarkable. 3. Distention of the urinary bladder. 4. No evidence of aortic aneurysm or dissection. Mild atherosclerotic   disease. CT HEAD WO CONTRAST   Final Result   1. No acute intracranial hemorrhage. 2. Mild global cortical atrophy with moderate chronic microvascular ischemic   changes. 3. Chronic sinusitis.                  ASSESSMENT/PLAN:    #Abdominal pain   #GI bleed -> BRBPR  -Hgb 14.9 --> 13.1   -continue to trend H & H   -CTA abdomen with no active bleeding, did show evidence of diverticulosis   -NPO   -IVF   -IV PPI   -GI consulted, planning for colonoscopy today     #Hyperlactatemia   -2.7  -will repeat   -IVF     #New RBBB and Left posterior fascicular block   # H/O CAD with stents   -initial trop <0.01  -repeat troponin pending   -no CP   -cardiology consulted   - on ASA and statin at home, holding     #Elevated PT/INR -> lab error. PT and INR rechecked and is normal now.    Patient is not on Coumadin therapy    #DM   -holding oral regimen   -on lantus -> getting decreased dose as patient is n.p.o. , will need to increase back to his home dose once diet started   - Cont low SSI   -continue to monitor BG     #HTN   -on monopril   -holding oral meds     #HLD   -on statin     #Lymphoma, in remission     DVT Prophylaxis: SCDs  Diet: Diet NPO Exceptions are: Ice Chips, Sips of Water with Meds  Code Status: Full Code     Morgan Mckeon MD  9/15/2022

## 2022-09-15 NOTE — PROGRESS NOTES
Received call back from Dr. David Blancas. Orders for 1 time dose of miralax 119 G to be given and have pt finish by 0900. He also states to hold off on tap water enema until seeing how miralax affects pt. Information passed on to day shift GEENA Hernandez.

## 2022-09-15 NOTE — ACP (ADVANCE CARE PLANNING)
Advance Care Planning   Healthcare Decision Maker:    Primary Decision Maker: Wesley Leo - [phone]    Click here to complete Healthcare Decision Makers including selection of the Healthcare Decision Maker Relationship (ie \"Primary\"). Today we documented Decision Maker(s) consistent with Legal Next of Kin hierarchy.

## 2022-09-15 NOTE — PROGRESS NOTES
Patient returned from colonoscopy. Wanting to eat. Page placed to Dr. Aaron Amaya for diet recommendation.

## 2022-09-15 NOTE — ANESTHESIA PRE PROCEDURE
Department of Anesthesiology  Preprocedure Note       Name:  Yu Costa   Age:  67 y.o.  :  1945                                          MRN:  [id number]         Date:  9/15/2022      Surgeon: Yousif Nix):  Stefany Walsh MD    Procedure: Procedure(s):  COLON IV SEDATION    Medications prior to admission:   Prior to Admission medications    Medication Sig Start Date End Date Taking? Authorizing Provider   alogliptin (NESINA) 25 MG TABS tablet Take 25 mg by mouth daily   Yes Historical Provider, MD   diclofenac sodium (VOLTAREN) 1 % GEL Apply topically 4 times daily as needed for Pain   Yes Historical Provider, MD   empagliflozin (JARDIANCE) 25 MG tablet Take 25 mg by mouth daily   Yes Historical Provider, MD   Multiple Vitamins-Minerals (THERAPEUTIC MULTIVITAMIN-MINERALS) tablet Take 1 tablet by mouth daily   Yes Historical Provider, MD   ascorbic acid (VITAMIN C) 500 MG tablet Take 500 mg by mouth daily   Yes Historical Provider, MD   Coenzyme Q10 (CO Q 10) 10 MG CAPS Take 1 capsule by mouth daily   Yes Historical Provider, MD   magnesium oxide (MAG-OX) 400 MG tablet Take 420 mg by mouth daily   Yes Historical Provider, MD   aspirin 81 MG tablet Take 81 mg by mouth daily. Historical Provider, MD   fosinopril (MONOPRIL) 40 MG tablet Take 40 mg by mouth daily. Historical Provider, MD   nitroGLYCERIN (NITROSTAT) 0.4 MG SL tablet Place 0.4 mg under the tongue every 5 minutes as needed. Historical Provider, MD   rosuvastatin (CRESTOR) 20 MG tablet Take 20 mg by mouth daily    Historical Provider, MD   insulin glargine (LANTUS) 100 UNIT/ML injection Inject 40 Units into the skin nightly. Patient taking differently: Inject 35 Units into the skin nightly 12   Alexandra Lofton MD   fish oil-omega-3 fatty acids 1000 MG capsule Take 1 g by mouth daily 2capsule in the morning.   One at night    Historical Provider, MD   metFORMIN (GLUCOPHAGE) 1000 MG tablet Take 1,000 mg by mouth 2 times daily (with meals).       Historical Provider, MD       Current medications:    Current Facility-Administered Medications   Medication Dose Route Frequency Provider Last Rate Last Admin    bisacodyl (DULCOLAX) EC tablet 20 mg  20 mg Oral Once Anson Britton MD        polyethylene glycol (GLYCOLAX) packet 17 g  17 g Oral Daily PRN Anson Britton MD        sodium chloride flush 0.9 % injection 5-40 mL  5-40 mL IntraVENous 2 times per day Anson Britton MD   10 mL at 09/15/22 0826    sodium chloride flush 0.9 % injection 5-40 mL  5-40 mL IntraVENous PRN Anson Britton MD        0.9 % sodium chloride infusion   IntraVENous PRN Anson Britton MD        ondansetron (ZOFRAN-ODT) disintegrating tablet 4 mg  4 mg Oral Q8H PRN Anson Britton MD        Or    ondansetron [facility]) injection 4 mg  4 mg IntraVENous Q6H PRN Anson Britton MD        acetaminophen (TYLENOL) tablet 650 mg  650 mg Oral Q6H PRN Anson Britton MD        Or   [facility] acetaminophen (TYLENOL) suppository 650 mg  650 mg Rectal Q6H PRN Anson Britton MD        0.9 % sodium chloride infusion   IntraVENous Continuous Anson Britton  mL/hr at 09/15/22 0806 New Bag at 09/15/22 0806    pantoprazole (PROTONIX) 80 mg in sodium chloride (PF) 20 mL injection  80 mg IntraVENous Once Anson Britton MD        Followed by   [facility] pantoprazole (PROTONIX) 40 mg in sodium chloride (PF) 10 mL injection  40 mg IntraVENous Q24H Anson Britton MD        ascorbic acid (VITAMIN C) tablet 500 mg  500 mg Oral Daily Anson Britton MD   500 mg at 09/15/22 0825    insulin glargine (LANTUS) injection vial 10 Units  10 Units SubCUTAneous Nightly Anson Britton MD        glucose chewable tablet 16 g  4 tablet Oral PRN Anson Britton MD        dextrose bolus 10% 125 mL  125 mL IntraVENous PRN Anson Britton MD        Or    dextrose bolus 10% 250 mL  250 mL IntraVENous PRN Anson Britton MD        glucagon (rDNA) injection 1 mg  1 mg SubCUTAneous PRN Anson Britton MD  dextrose 10 % infusion   IntraVENous Continuous PRN Raiza Ruiz MD        insulin lispro (HUMALOG) injection vial 0-4 Units  0-4 Units SubCUTAneous TID WC Raiza Ruiz MD        insulin lispro (HUMALOG) injection vial 0-4 Units  0-4 Units SubCUTAneous Nightly Raiza Ruiz MD        [facility] capsule 0.4 mg  0.4 mg Oral Daily Anjelica Meneses MD   0.4 mg at 09/15/22 0825       Allergies: Allergies   Allergen Reactions    Doxycycline Rash       Problem List:    Patient Active Problem List   Diagnosis Code    Hyperlipidemia E78.5    Diabetes mellitus ([address] E11.9    Lymphoma ([facility] [address] C85.90    DDD (degenerative disc disease) [id number]    Chronic bronchitis ([facility] [address] J42    Psoriasis L40.9    CAD (coronary artery disease) I25.10    History of non-Hodgkin's lymphoma Z85.72    GI bleed K92.2       Past Medical History:        Diagnosis Date    CAD (coronary artery disease) 3/15/2013    Cancer ([facility] [address]     DDD (degenerative disc disease) 9/25/2012    Diabetes mellitus ([facility] [address]     Herniated disc     Hyperlipidemia 9/25/2012    Hypertension        Past Surgical History:        Procedure Laterality Date    CHOLECYSTECTOMY      COLONOSCOPY  8/13/2014    CORONARY ANGIOPLASTY WITH STENT PLACEMENT      OTHER SURGICAL HISTORY Right 3/3/2016    PORT REMOVAL        Social History:    Social History     Tobacco Use    Smoking status: Former    Smokeless tobacco: Former   Substance Use Topics    Alcohol use:  No                                Counseling given: Not Answered      Vital Signs (Current):   Vitals:    09/14/22 1900 09/15/22 0232 09/15/22 0808 09/15/22 1248   BP: 123/68 (!) 140/76 (!) 154/74 (!) 142/67   Pulse: 72 80 93 99   Resp: 16 20 18 16   Temp: 98 °F (36.7 °C) 97.9 °F (36.6 °C) 98.6 °F (37 °C) 98.4 °F (36.9 °C)   TempSrc: Oral Oral Oral Infrared   SpO2: 97% 94% 94% 97%   Weight:  183 lb 12.8 oz (83.4 kg)     Height:                                                  BP Readings from Last 3 Encounters:   09/15/22 (!) 142/67   07/25/22 (!) 130/59   12/26/18 (!) 147/77       NPO Status: Time of last liquid consumption: 0000                        Time of last solid consumption: 0000                        Date of last liquid consumption: 09/15/22                        Date of last solid food consumption: 09/13/22    BMI:   Wt Readings from Last 3 Encounters:   09/15/22 183 lb 12.8 oz (83.4 kg)   07/25/22 193 lb (87.5 kg)   12/26/18 193 lb (87.5 kg)     Body mass index is 26.37 kg/m².     CBC:   Lab Results   Component Value Date/Time    WBC 9.2 09/14/2022 03:13 PM    RBC 4.60 09/14/2022 03:13 PM    RBC 5.01 02/20/2017 12:50 PM    HGB 13.2 09/15/2022 11:54 AM    HCT 38.5 09/15/2022 11:54 AM    MCV 92.6 09/14/2022 03:13 PM    RDW 12.9 09/14/2022 03:13 PM     09/14/2022 03:13 PM       CMP:   Lab Results   Component Value Date/Time     09/14/2022 03:13 PM    K 4.4 09/14/2022 03:13 PM    CL 96 09/14/2022 03:13 PM    CO2 27 09/14/2022 03:13 PM    BUN 15 09/14/2022 03:13 PM    CREATININE 1.0 09/14/2022 03:13 PM    GFRAA >60 09/14/2022 03:13 PM    GFRAA >60 04/03/2013 12:30 PM    AGRATIO 1.2 09/14/2022 03:13 PM    LABGLOM >60 09/14/2022 03:13 PM    GLUCOSE 228 09/14/2022 03:35 PM    GLUCOSE 219 08/22/2016 01:17 PM    PROT 7.2 09/14/2022 03:13 PM    PROT 6.4 08/22/2016 01:17 PM    CALCIUM 9.1 09/14/2022 03:13 PM    BILITOT 0.7 09/14/2022 03:13 PM    ALKPHOS 87 09/14/2022 03:13 PM    AST 17 09/14/2022 03:13 PM    ALT 13 09/14/2022 03:13 PM       POC Tests:   Recent Labs     09/15/22  1125   POCGLU 104*       Coags:   Lab Results   Component Value Date/Time    PROTIME 14.1 09/15/2022 08:09 AM    INR 1.11 09/15/2022 08:09 AM    APTT 38.4 09/15/2022 05:52 AM       HCG (If Applicable): No results found for: PREGTESTUR, PREGSERUM, HCG, HCGQUANT     ABGs: No results found for: PHART, PO2ART, QJW6TXM, DVG1RAR, BEART, E3NRTQFT     Type & Screen (If Applicable):  No results found for: LABABO, [address]    Drug/Infectious Status (If Applicable):  No results found for: HIV, HEPCAB    COVID-19 Screening (If Applicable):   Lab Results   Component Value Date/Time    COVID19 Not Detected 09/14/2022 06:06 PM           Anesthesia Evaluation  Patient summary reviewed no history of anesthetic complications:   Airway: Mallampati: II  TM distance: >3 FB   Neck ROM: full  Mouth opening: > = 3 FB   Dental:    (+) edentulous      Pulmonary: breath sounds clear to auscultation      (-) COPD, asthma, shortness of breath, sleep apnea and not a current smoker          Patient did not smoke on day of surgery. Cardiovascular:    (+) hypertension:, CAD: obstructive, CABG/stent (stents):, hyperlipidemia    (-) pacemaker, past MI, dysrhythmias and  angina    ECG reviewed  Rhythm: regular  Rate: normal  Echocardiogram reviewed         Beta Blocker:  Not on Beta Blocker         Neuro/Psych:   (+) neuromuscular disease (ddd):,    (-) seizures, TIA and CVA           GI/Hepatic/Renal:   (+) bowel prep,      (-) GERD, liver disease, no renal disease and no morbid obesity       Endo/Other:    (+) DiabetesType II DM, using insulin, blood dyscrasia (lymphoma): arthritis:., malignancy/cancer (lymphoma, [place]).    (-) hypothyroidism, hyperthyroidism               Abdominal:       Abdomen: soft. Vascular: negative vascular ROS. Other Findings:           Anesthesia Plan      TIVA     ASA 3       Induction: intravenous. MIPS: Prophylactic antiemetics administered. Anesthetic plan and risks discussed with patient. Plan discussed with CRNA. This pre-anesthesia assessment may be used as a history and physical.    DOS STAFF ADDENDUM:    Pt seen and examined, chart reviewed (including anesthesia, drug and allergy history). No interval changes to history and physical examination. Anesthetic plan, risks, benefits, alternatives, and personnel involved discussed with patient.   Patient verbalized an

## 2022-09-15 NOTE — PLAN OF CARE
Problem: Discharge Planning  Goal: Discharge to home or other facility with appropriate resources  9/15/2022 1042 by Michelle Harvey RN  Outcome: Progressing  9/15/2022 0825 by Rik Shell RN  Outcome: Progressing     Problem: ABCDS Injury Assessment  Goal: Absence of physical injury  9/15/2022 1042 by Michelle Harvey RN  Outcome: Progressing  9/15/2022 0825 by Rik Shell RN  Outcome: Progressing     Problem: Safety - Adult  Goal: Free from fall injury  9/15/2022 1042 by Michelle Harvey RN  Outcome: Progressing  9/15/2022 0825 by Rik Shell RN  Outcome: Progressing     Problem: Chronic Conditions and Co-morbidities  Goal: Patient's chronic conditions and co-morbidity symptoms are monitored and maintained or improved  Outcome: Progressing

## 2022-09-15 NOTE — PROGRESS NOTES
Pt's urine changed from  clear yellow to clear cherry color at this time after having multiple bowel movements.

## 2022-09-15 NOTE — PROGRESS NOTES
End of shift report given to [facility], RN and Dora Clement RN. Transfer of care done at this time.

## 2022-09-15 NOTE — BRIEF OP NOTE
Brief Postoperative Note      Patient: Yu Costa  YOB: 1945  MRN: [id number]    Date of Procedure: 9/15/2022    Pre-Op Diagnosis: RECTAL BLEEDING    Post-Op Diagnosis:  single non-bleeding Cecal AVM s/p APC, diverticulosis, hemorrhoids. No active bleeding       Procedure(s):  COLONOSCOPY CONTROL HEMORRHAGE    Surgeon(s):  Stefany Walsh MD    Assistant:  * No surgical staff found *    Anesthesia: IV Sedation    Estimated Blood Loss (mL): Minimal    Complications: None    Specimens:   * No specimens in log *    Implants:  * No implants in log *      Drains:   Urinary Catheter 09/14/22 Krause (Active)   Catheter Indications Perioperative use for selected surgical procedures 09/15/22 1352   Urine Color Tamia 09/15/22 1727   Urine Appearance Clear 09/15/22 1727   Collection Container Standard 09/15/22 1352   Securement Method Securing device (Describe) 09/15/22 0934   Status Draining 09/15/22 1352   Output (mL) 750 mL 09/15/22 1727       Findings: single non-bleeding Cecal AVM s/p APC, diverticulosis, hemorrhoids.  No active bleeding    Recommendation  Continue supportive care  Monitor Hgb  Observe for signs of bleeding  Advance to highfiber diet  Start Metamucil daily  OK to d/c from GI standpoint    Electronically signed by Stefany Walsh MD on 9/15/2022 at 6:06 PM

## 2022-09-15 NOTE — H&P
History and Physical / Pre-Sedation Assessment    Patient:  Natalie Jones   :   1945     Intended Procedure:  Colonoscopy    HPI: 67year old male with history of HTN, HLD, DM, CAD, DDD, and lymphoma s/p chemoXRT admitted with rectal bleeding     Past Medical History:   Diagnosis Date    CAD (coronary artery disease) 3/15/2013    Cancer ([facility] [address]     DDD (degenerative disc disease) 2012    Diabetes mellitus ([facility] [address]     Herniated disc     Hyperlipidemia 2012    Hypertension      Past Surgical History:   Procedure Laterality Date    CHOLECYSTECTOMY      COLONOSCOPY  2014    CORONARY ANGIOPLASTY WITH STENT PLACEMENT      OTHER SURGICAL HISTORY Right 3/3/2016    PORT REMOVAL        Medications reviewed  Prior to Admission medications    Medication Sig Start Date End Date Taking? Authorizing Provider   alogliptin (NESINA) 25 MG TABS tablet Take 25 mg by mouth daily   Yes Historical Provider, MD   diclofenac sodium (VOLTAREN) 1 % GEL Apply topically 4 times daily as needed for Pain   Yes Historical Provider, MD   empagliflozin (JARDIANCE) 25 MG tablet Take 25 mg by mouth daily   Yes Historical Provider, MD   Multiple Vitamins-Minerals (THERAPEUTIC MULTIVITAMIN-MINERALS) tablet Take 1 tablet by mouth daily   Yes Historical Provider, MD   ascorbic acid (VITAMIN C) 500 MG tablet Take 500 mg by mouth daily   Yes Historical Provider, MD   Coenzyme Q10 (CO Q 10) 10 MG CAPS Take 1 capsule by mouth daily   Yes Historical Provider, MD   magnesium oxide (MAG-OX) 400 MG tablet Take 420 mg by mouth daily   Yes Historical Provider, MD   aspirin 81 MG tablet Take 81 mg by mouth daily. Historical Provider, MD   fosinopril (MONOPRIL) 40 MG tablet Take 40 mg by mouth daily. Historical Provider, MD   nitroGLYCERIN (NITROSTAT) 0.4 MG SL tablet Place 0.4 mg under the tongue every 5 minutes as needed.     Historical Provider, MD   rosuvastatin (CRESTOR) 20 MG tablet Take 20 mg by mouth daily    Historical Provider, MD insulin glargine (LANTUS) 100 UNIT/ML injection Inject 40 Units into the skin nightly. Patient taking differently: Inject 35 Units into the skin nightly 9/25/12   Pamela Jones MD   fish oil-omega-3 fatty acids 1000 MG capsule Take 1 g by mouth daily 2capsule in the morning. One at night    Historical Provider, MD   metFORMIN (GLUCOPHAGE) 1000 MG tablet Take 1,000 mg by mouth 2 times daily (with meals). Historical Provider, MD        Allergies: Allergies   Allergen Reactions    Doxycycline Rash       Nurses notes reviewed and agreed. Physical Exam:  Vital Signs: BP (!) 142/67   Pulse 99   Temp 98.4 °F (36.9 °C) (Infrared)   Resp 16   Ht 5' 10\" (1.778 m)   Wt 183 lb 12.8 oz (83.4 kg)   SpO2 97%   BMI 26.37 kg/m²    Airway: Mallampati: II (soft palate, uvula, fauces visible)  Pulmonary:Normal  Cardiac:Normal  Abdomen:Normal    Pre-Procedure Assessment / Plan:  ASA: Class 3 - A patient with severe systemic disease that limits activity but is not incapacitating  Level of Sedation Plan: Moderate sedation  Post Procedure plan: Return to same level of care    I assessed the patient and find that the patient is in satisfactory condition to proceed with the planned procedure and sedation plan. I have explained the risk, benefits, and alternatives to the procedure; the patient understands and agrees to proceed.        Tiarra Valle MD  9/15/2022

## 2022-09-15 NOTE — PROGRESS NOTES
Call placed to GI regarding pt's continued dark stools. Dr. Gail Em called back, order for tap water enema to be given @ 1000 received.

## 2022-09-15 NOTE — CONSULTS
CARDIOLOGY CONSULTATION        Patient Name: Everton Puckett  Date of admission: 9/14/2022  2:46 PM  Admission Dx: Diverticulosis [K57.90]  GI bleed [K92.2]  Gastrointestinal hemorrhage, unspecified gastrointestinal hemorrhage type [K92.2]  Requesting Physician: Sybil Lyon MD  Primary Care physician: Adriana Aguilar MD    Reason for Consultation/Chief Complaint: Bifascicular block     History of Present Illness:     Everton Puckett is a 67 y.o. patient with a prior medical history notable for coronary artery disease with prior PCI LAD and subsequently RCA  2018, diabetes mellitus, hypertension, hyperlipidemia, and lymphoma in remission, who presented to the hospital with complaints of BRBPR. Cardiology consulted for abnormal EKG. ED course/Vital signs on presentation: BP 85/48, HR 65 bpm, RR 17, 94% RA. EKG NSR with RBBB/LPFB, new. CT abd showed extensive diverticulosis. Pt states he had URI symptoms recently. Neg COVID. Couple days later, noted blood on tissue paper that eventually progressed to BRBPR. +abd cramps. He notes usual state of health prior to this. Tries to keep active - maintains his yard, does trimming, walks as far as he wants on level ground. Denies recent angina. No SL NTG use. No limiting dyspnea with exertion. Denies palpitations, dizziness, near-syncope or dania syncope. Denies paroxysmal nocturnal dyspnea, orthopnea, increasing lower extremity edema or weight gain. Non-smoker. The patient is compliant with medications. Cost of medications is affordable. No endorsed side effects. Past Medical History:   has a past medical history of CAD (coronary artery disease), Cancer ([facility] [address] DDD (degenerative disc disease), Diabetes mellitus ([facility] [address] Herniated disc, Hyperlipidemia, and Hypertension. Surgical History:   has a past surgical history that includes Cholecystectomy; Coronary angioplasty with stent;  Colonoscopy (8/13/2014); and other surgical history (Right, 3/3/2016). Social History:   reports that he has quit smoking. He has quit using smokeless tobacco. He reports that he does not drink alcohol and does not use drugs. Family History:  family history includes Cancer in his father; Heart Disease in his mother; Stroke in his mother. Home Medications:  Were reviewed and are listed in nursing record and/or below  Prior to Admission medications    Medication Sig Start Date End Date Taking? Authorizing Provider   alogliptin (NESINA) 25 MG TABS tablet Take 25 mg by mouth daily   Yes Historical Provider, MD   diclofenac sodium (VOLTAREN) 1 % GEL Apply topically 4 times daily as needed for Pain   Yes Historical Provider, MD   empagliflozin (JARDIANCE) 25 MG tablet Take 25 mg by mouth daily   Yes Historical Provider, MD   Multiple Vitamins-Minerals (THERAPEUTIC MULTIVITAMIN-MINERALS) tablet Take 1 tablet by mouth daily   Yes Historical Provider, MD   ascorbic acid (VITAMIN C) 500 MG tablet Take 500 mg by mouth daily   Yes Historical Provider, MD   Coenzyme Q10 (CO Q 10) 10 MG CAPS Take 1 capsule by mouth daily   Yes Historical Provider, MD   magnesium oxide (MAG-OX) 400 MG tablet Take 420 mg by mouth daily   Yes Historical Provider, MD   aspirin 81 MG tablet Take 81 mg by mouth daily. Historical Provider, MD   fosinopril (MONOPRIL) 40 MG tablet Take 40 mg by mouth daily. Historical Provider, MD   nitroGLYCERIN (NITROSTAT) 0.4 MG SL tablet Place 0.4 mg under the tongue every 5 minutes as needed. Historical Provider, MD   rosuvastatin (CRESTOR) 20 MG tablet Take 20 mg by mouth daily    Historical Provider, MD   insulin glargine (LANTUS) 100 UNIT/ML injection Inject 40 Units into the skin nightly. Patient taking differently: Inject 35 Units into the skin nightly 9/25/12   Cy Brady MD   fish oil-omega-3 fatty acids 1000 MG capsule Take 1 g by mouth daily 2capsule in the morning.   One at night    Historical Provider, MD   metFORMIN (GLUCOPHAGE) 1000 MG tablet Take 1,000 mg by mouth 2 times daily (with meals). Historical Provider, MD        CURRENT Medications:  bisacodyl (DULCOLAX) EC tablet 20 mg, Once  polyethylene glycol (GLYCOLAX) packet 17 g, Daily PRN  sodium chloride flush 0.9 % injection 5-40 mL, 2 times per day  sodium chloride flush 0.9 % injection 5-40 mL, PRN  0.9 % sodium chloride infusion, PRN  ondansetron (ZOFRAN-ODT) disintegrating tablet 4 mg, Q8H PRN   Or  ondansetron (ZOFRAN) injection 4 mg, Q6H PRN  acetaminophen (TYLENOL) tablet 650 mg, Q6H PRN   Or  acetaminophen (TYLENOL) suppository 650 mg, Q6H PRN  0.9 % sodium chloride infusion, Continuous  pantoprazole (PROTONIX) 80 mg in sodium chloride (PF) 20 mL injection, Once   Followed by  pantoprazole (PROTONIX) 40 mg in sodium chloride (PF) 10 mL injection, Q24H  ascorbic acid (VITAMIN C) tablet 500 mg, Daily  insulin glargine (LANTUS) injection vial 10 Units, Nightly  glucose chewable tablet 16 g, PRN  dextrose bolus 10% 125 mL, PRN   Or  dextrose bolus 10% 250 mL, PRN  glucagon (rDNA) injection 1 mg, PRN  dextrose 10 % infusion, Continuous PRN  insulin lispro (HUMALOG) injection vial 0-4 Units, TID WC  insulin lispro (HUMALOG) injection vial 0-4 Units, Nightly  tamsulosin (FLOMAX) capsule 0.4 mg, Daily        Allergies:  Doxycycline     Review of Systems:   A 14 point review of symptoms completed. Pertinent positives identified in the HPI, all other review of symptoms negative as below.       Objective:     Vitals:    09/14/22 1843 09/14/22 1900 09/15/22 0232 09/15/22 0808   BP: 126/61 123/68 (!) 140/76 (!) 154/74   Pulse: 75 72 80 93   Resp: 16 16 20 18   Temp:  98 °F (36.7 °C) 97.9 °F (36.6 °C) 98.6 °F (37 °C)   TempSrc:  Oral Oral Oral   SpO2: 97% 97% 94% 94%   Weight:   183 lb 12.8 oz (83.4 kg)    Height:          Weight: 183 lb 12.8 oz (83.4 kg)       PHYSICAL EXAM:    General:  Alert, cooperative, no distress, appears stated age   Head:  Normocephalic, atraumatic   Eyes: Conjunctiva/corneas clear, anicteric sclerae    Nose: Nares normal, no drainage or sinus tenderness   Throat: No abnormalities of the lips, oral mucosa or tongue. Neck: Trachea midline. Neck supple with no lymphadenopathy, thyroid not enlarged, symmetric, no tenderness/mass/nodules, flat neck vv   Lungs:   Clear to auscultation bilaterally, no wheezes, no rales, no respiratory distress   Chest Wall:  No deformity or tenderness to palpation   Heart:  Regular rate and rhythm, normal S1, normal S2, soft systolic ejection murmur LSB, no rub, no S3/S4, PMI non-displaced. Abdomen:   Soft, non-tender, with normoactive bowel sounds. No masses, no hepatosplenomegaly   Extremities: No cyanosis, clubbing or pitting edema. Vascular: 2+ radial,2+ dorsalis pedis and posterior tibial pulses bilaterally. Brisk carotid upstrokes without carotid bruit. Skin: Venous stasis changes bilat, WWP   Pysch: Euthymic mood, appropriate affect   Neurologic: Oriented to person, place and time. No slurred speech or facial asymmetry. No motor or sensory deficits on gross examination.          Labs:   CBC:   Lab Results   Component Value Date/Time    WBC 9.2 09/14/2022 03:13 PM    RBC 4.60 09/14/2022 03:13 PM    RBC 5.01 02/20/2017 12:50 PM    HGB 13.1 09/15/2022 05:52 AM    HCT 39.1 09/15/2022 05:52 AM    MCV 92.6 09/14/2022 03:13 PM    RDW 12.9 09/14/2022 03:13 PM     09/14/2022 03:13 PM     CMP:  Lab Results   Component Value Date/Time     09/14/2022 03:13 PM    K 4.4 09/14/2022 03:13 PM    CL 96 09/14/2022 03:13 PM    CO2 27 09/14/2022 03:13 PM    BUN 15 09/14/2022 03:13 PM    CREATININE 1.0 09/14/2022 03:13 PM    GFRAA >60 09/14/2022 03:13 PM    GFRAA >60 04/03/2013 12:30 PM    AGRATIO 1.2 09/14/2022 03:13 PM    LABGLOM >60 09/14/2022 03:13 PM    GLUCOSE 228 09/14/2022 03:35 PM    GLUCOSE 219 08/22/2016 01:17 PM    PROT 7.2 09/14/2022 03:13 PM    PROT 6.4 08/22/2016 01:17 PM    CALCIUM 9.1 09/14/2022 03:13 PM    BILITOT 0.7 09/14/2022 03:13 PM    ALKPHOS 87 09/14/2022 03:13 PM    AST 17 09/14/2022 03:13 PM    ALT 13 09/14/2022 03:13 PM     PT/INR:  No results found for: PTINR  HgBA1c:  Lab Results   Component Value Date    LABA1C 5.3 10/28/2013     Lab Results   Component Value Date    TROPONINI <0.01 09/15/2022       No results found for: CHOL  No results found for: TRIG  No results found for: HDL  No results found for: LDLCHOLESTEROL, LDLCALC  No results found for: LABVLDL, VLDL  No results found for: [facility]     Cardiac Data:     EKG: personally reviewed as above. Telemetry personally reviewed: sinus 70's, first deg AVB, no events. [facility] 2018  Left Ventricle: The left ventricular function is low normal. Overall   left ventricular ejection fraction is estimated to be 50-55%. The   left ventricle is normal in size. There is normal left ventricular   wall thickness. There is borderline global hypokinesis of the left   ventricle. No left ventricular thrombus detected. Right Ventricle: The right ventricle is normal size. There is normal   right ventricular wall thickness. The right ventricular systolic   function is normal.     Left Atrium: The left atrial size is normal.     Right Atrium: Right atrial size is normal.     Mitral Valve: The mitral valve leaflets are mildly calcified in   appearance. There is no mitral regurgitation noted. There is no   evidence of mitral valve prolapse. There is no mitral valve stenosis. Aortic Valve: The Aortic Valve leaflets appear sclerotic. No aortic   regurgitation is present. No hemodynamically significant valvular   aortic stenosis. Aortic valve peak gradient is 4.8 mmHg. Aortic valve   mean gradient is 2.5 mmHg. Aortic valve area is 3.7 cm^2. Aortic Root: The aortic root is normal size. Tricuspid Valve: The tricuspid valve is normal in structure and   function. Trace tricuspid regurgitation is present.      Diastolic Function: The diastolic function is impaired and classified   as Grade 1 (impaired relaxation). Pulmonic Valve/Pulmonary Artery: The pulmonic valve is normal in   structure. There is trace pulmonic valvular regurgitation. Pericardium: There is no pericardial effusion. There is no pleural   effusion. Cardiac catheterization: 2018    Findings: Aortic Pressure: 93/13 mmHg. Left Ventricle:  EF=50%,  Wall motion: Normal.  LVEDP=10 mmHg. No aortic valve gradient seen. Coronary angiogram:   Left Main Trunk (LMT): Normal   Left Anterior Descending (LAD): proximal 30% stenosis, patent mid   stent, distal luminal irregularities   Ramus: 60-70% stenosis, small 2mm vessel   Left Circumflex (LCX): very small 1.5mm, proximal 90% stenosis   Right Coronary (RCA): dominant vessel, sub-totaled 99% mid   chronic occlusion with extensive left to right collaterals   collaterals     PCI of the RCA Artery       IMPRESSION:   1) 3 vessel CAD with patent LAD stent,  of RCA and diffusely   disease small Left Circumflex   2) Low normal LVEF   3) Normal LVEDP   4) Successful PCI of  of Right Coronary Artery with 3   overlapping drug eluting stents (Synergy 3.0x28mm post dilated to   3.4mm, Synergy 3.0x38mm and Synergy 3.0x38mm)      Additional studies:     Impression and Plan:      Abnormal EKG  Bifascicular block  Hx 1st deg AVB  -not new, appear present by outside EKG 2021 (RBBB, LPFB read at that time). -no indication for PPM    Coronary artery disease with prior PCI LAD and subsequently RCA  2018  -continue aspirin, crestor, jardiance, fish oil  -no anti-anginals at this time per OP med list   -Follows with Dr. Kahlil Herrera  -aspirin presently on hold for GIB. Restart as soon as deemed appropriate by GI.   -no active angina or instability. Proceed with endoscopy as planned.     Hypertension   -elevated in this setting  -longterm goal < 130/80    Hyperlipidemia  -LDL 40, TG 2789 12/2021    Diabetes mellitus  -a1c 7.6 12/2021     Lower GI bleed  -Proceed with endoscopy . Cardiology service will sign off. Follow up with Dr. Cecilio Cline. Patient Active Problem List   Diagnosis    Hyperlipidemia    Diabetes mellitus ([facility] [address]    Lymphoma ([facility] [address]    DDD (degenerative disc disease)    Chronic bronchitis (HCC)    Psoriasis    CAD (coronary artery disease)    History of non-Hodgkin's lymphoma    GI bleed         I will address the patient's cardiac risk factors and adjusted pharmacologic treatment as needed. In addition, I have reinforced the need for patient directed risk factor modification. All questions and concerns were addressed to the patient/family. Alternatives to my treatment were discussed. Thank you for allowing us to participate in the care of Hemant Barba. Please call me with any questions [phone].     Domenica Hameed MD, [facility]  Cardiovascular Disease  [address]  [phone] [address] [facility]  [phone] [address] [place] [address]  9/15/2022 10:42 AM

## 2022-09-15 NOTE — PROGRESS NOTES
Received call from Micro lab for Gram stain Aerobic bottle: Gram positive cocci in clusters resembling Staphylococcus.    PerfectServe sent to Dr. Leonel Lomax

## 2022-09-15 NOTE — PROGRESS NOTES
Called floor nurse Janis Ramirez and gave phone report of patient. Patient vitals stable. Family at bedside.   Denies all pain

## 2022-09-16 ENCOUNTER — TELEPHONE (OUTPATIENT)
Dept: CARDIOLOGY CLINIC | Age: 77
End: 2022-09-16

## 2022-09-16 ENCOUNTER — HOSPITAL ENCOUNTER (INPATIENT)
Age: 77
LOS: 4 days | Discharge: HOME | DRG: 242 | End: 2022-09-20
Attending: HOSPITALIST | Admitting: INTERNAL MEDICINE
Payer: MEDICARE

## 2022-09-16 VITALS
RESPIRATION RATE: 16 BRPM | OXYGEN SATURATION: 99 % | BODY MASS INDEX: 25.13 KG/M2 | DIASTOLIC BLOOD PRESSURE: 76 MMHG | HEIGHT: 70 IN | WEIGHT: 175.5 LBS | TEMPERATURE: 98.2 F | SYSTOLIC BLOOD PRESSURE: 159 MMHG | HEART RATE: 83 BPM

## 2022-09-16 DIAGNOSIS — R94.31 EKG ABNORMALITY: ICD-10-CM

## 2022-09-16 DIAGNOSIS — I45.2 RBBB (RIGHT BUNDLE BRANCH BLOCK WITH LEFT ANTERIOR FASCICULAR BLOCK): ICD-10-CM

## 2022-09-16 DIAGNOSIS — I45.2 BIFASCICULAR BUNDLE BRANCH BLOCK: Primary | ICD-10-CM

## 2022-09-16 DIAGNOSIS — I44.30 UNSPECIFIED ATRIOVENTRICULAR BLOCK: ICD-10-CM

## 2022-09-16 DIAGNOSIS — Z86.79 H/O FIRST DEGREE ATRIOVENTRICULAR BLOCK: ICD-10-CM

## 2022-09-16 PROBLEM — I44.2 COMPLETE HEART BLOCK (HCC): Status: ACTIVE | Noted: 2022-09-16

## 2022-09-16 PROBLEM — I10 PRIMARY HYPERTENSION: Status: ACTIVE | Noted: 2022-09-16

## 2022-09-16 PROBLEM — K57.90 DIVERTICULOSIS: Status: ACTIVE | Noted: 2022-09-16

## 2022-09-16 LAB
ANION GAP SERPL CALCULATED.3IONS-SCNC: 11 MMOL/L (ref 3–16)
BUN BLDV-MCNC: 10 MG/DL (ref 7–20)
CALCIUM SERPL-MCNC: 8.5 MG/DL (ref 8.3–10.6)
CHLORIDE BLD-SCNC: 101 MMOL/L (ref 99–110)
CO2: 24 MMOL/L (ref 21–32)
CREAT SERPL-MCNC: 0.8 MG/DL (ref 0.8–1.3)
EKG ATRIAL RATE: 78 BPM
EKG DIAGNOSIS: NORMAL
EKG P AXIS: 74 DEGREES
EKG P-R INTERVAL: 258 MS
EKG Q-T INTERVAL: 426 MS
EKG QRS DURATION: 148 MS
EKG QTC CALCULATION (BAZETT): 485 MS
EKG R AXIS: 110 DEGREES
EKG T AXIS: 64 DEGREES
EKG VENTRICULAR RATE: 78 BPM
GFR AFRICAN AMERICAN: >60
GFR NON-AFRICAN AMERICAN: >60
GLUCOSE BLD-MCNC: 130 MG/DL (ref 70–99)
GLUCOSE BLD-MCNC: 132 MG/DL (ref 70–99)
GLUCOSE BLD-MCNC: 149 MG/DL (ref 70–99)
GLUCOSE BLD-MCNC: 190 MG/DL (ref 70–99)
GLUCOSE BLD-MCNC: 195 MG/DL (ref 70–99)
GLUCOSE BLD-MCNC: 238 MG/DL (ref 70–99)
HCT VFR BLD CALC: 36.3 % (ref 40.5–52.5)
HEMOGLOBIN: 12.6 G/DL (ref 13.5–17.5)
PERFORMED ON: ABNORMAL
POTASSIUM REFLEX MAGNESIUM: 4.3 MMOL/L (ref 3.5–5.1)
SODIUM BLD-SCNC: 136 MMOL/L (ref 136–145)

## 2022-09-16 PROCEDURE — 80048 BASIC METABOLIC PNL TOTAL CA: CPT

## 2022-09-16 PROCEDURE — 2000000000 HC ICU R&B

## 2022-09-16 PROCEDURE — 99239 HOSP IP/OBS DSCHRG MGMT >30: CPT | Performed by: INTERNAL MEDICINE

## 2022-09-16 PROCEDURE — 85018 HEMOGLOBIN: CPT

## 2022-09-16 PROCEDURE — 85014 HEMATOCRIT: CPT

## 2022-09-16 PROCEDURE — 2580000003 HC RX 258: Performed by: ANESTHESIOLOGY

## 2022-09-16 PROCEDURE — 6370000000 HC RX 637 (ALT 250 FOR IP): Performed by: INTERNAL MEDICINE

## 2022-09-16 PROCEDURE — 93005 ELECTROCARDIOGRAM TRACING: CPT | Performed by: INTERNAL MEDICINE

## 2022-09-16 PROCEDURE — 36415 COLL VENOUS BLD VENIPUNCTURE: CPT

## 2022-09-16 PROCEDURE — 99233 SBSQ HOSP IP/OBS HIGH 50: CPT | Performed by: INTERNAL MEDICINE

## 2022-09-16 PROCEDURE — 6370000000 HC RX 637 (ALT 250 FOR IP): Performed by: PHYSICIAN ASSISTANT

## 2022-09-16 PROCEDURE — 94761 N-INVAS EAR/PLS OXIMETRY MLT: CPT

## 2022-09-16 PROCEDURE — 99223 1ST HOSP IP/OBS HIGH 75: CPT | Performed by: INTERNAL MEDICINE

## 2022-09-16 PROCEDURE — 2700000000 HC OXYGEN THERAPY PER DAY

## 2022-09-16 RX ORDER — POLYETHYLENE GLYCOL 3350 17 G/17G
17 POWDER, FOR SOLUTION ORAL DAILY PRN
Status: DISCONTINUED | OUTPATIENT
Start: 2022-09-16 | End: 2022-09-20 | Stop reason: HOSPADM

## 2022-09-16 RX ORDER — PANTOPRAZOLE SODIUM 40 MG/1
40 TABLET, DELAYED RELEASE ORAL
Status: DISCONTINUED | OUTPATIENT
Start: 2022-09-17 | End: 2022-09-16 | Stop reason: HOSPADM

## 2022-09-16 RX ORDER — ONDANSETRON 4 MG/1
4 TABLET, ORALLY DISINTEGRATING ORAL EVERY 8 HOURS PRN
Status: DISCONTINUED | OUTPATIENT
Start: 2022-09-16 | End: 2022-09-20 | Stop reason: HOSPADM

## 2022-09-16 RX ORDER — ACETAMINOPHEN 325 MG/1
650 TABLET ORAL EVERY 6 HOURS PRN
Status: DISCONTINUED | OUTPATIENT
Start: 2022-09-16 | End: 2022-09-20 | Stop reason: HOSPADM

## 2022-09-16 RX ORDER — ASCORBIC ACID 500 MG
500 TABLET ORAL DAILY
Status: DISCONTINUED | OUTPATIENT
Start: 2022-09-17 | End: 2022-09-20 | Stop reason: HOSPADM

## 2022-09-16 RX ORDER — INSULIN GLARGINE 100 [IU]/ML
34 INJECTION, SOLUTION SUBCUTANEOUS NIGHTLY
COMMUNITY
Start: 2022-09-16

## 2022-09-16 RX ORDER — INSULIN LISPRO 100 [IU]/ML
0-4 INJECTION, SOLUTION INTRAVENOUS; SUBCUTANEOUS
Status: DISCONTINUED | OUTPATIENT
Start: 2022-09-17 | End: 2022-09-20 | Stop reason: HOSPADM

## 2022-09-16 RX ORDER — SODIUM CHLORIDE 0.9 % (FLUSH) 0.9 %
5-40 SYRINGE (ML) INJECTION PRN
Status: DISCONTINUED | OUTPATIENT
Start: 2022-09-16 | End: 2022-09-20 | Stop reason: HOSPADM

## 2022-09-16 RX ORDER — ASPIRIN 81 MG/1
81 TABLET, CHEWABLE ORAL DAILY
Status: DISCONTINUED | OUTPATIENT
Start: 2022-09-16 | End: 2022-09-16 | Stop reason: HOSPADM

## 2022-09-16 RX ORDER — AZITHROMYCIN 250 MG/1
250 TABLET, FILM COATED ORAL DAILY
Qty: 1 TABLET | Refills: 0 | Status: ON HOLD | COMMUNITY
Start: 2022-09-17 | End: 2022-09-20 | Stop reason: HOSPADM

## 2022-09-16 RX ORDER — TAMSULOSIN HYDROCHLORIDE 0.4 MG/1
0.4 CAPSULE ORAL DAILY
Status: DISCONTINUED | OUTPATIENT
Start: 2022-09-17 | End: 2022-09-20 | Stop reason: HOSPADM

## 2022-09-16 RX ORDER — SODIUM CHLORIDE 0.9 % (FLUSH) 0.9 %
5-40 SYRINGE (ML) INJECTION EVERY 12 HOURS SCHEDULED
Status: DISCONTINUED | OUTPATIENT
Start: 2022-09-16 | End: 2022-09-20 | Stop reason: HOSPADM

## 2022-09-16 RX ORDER — SODIUM CHLORIDE 9 MG/ML
INJECTION, SOLUTION INTRAVENOUS PRN
Status: DISCONTINUED | OUTPATIENT
Start: 2022-09-16 | End: 2022-09-20 | Stop reason: HOSPADM

## 2022-09-16 RX ORDER — AZITHROMYCIN 250 MG/1
250 TABLET, FILM COATED ORAL DAILY
Status: COMPLETED | OUTPATIENT
Start: 2022-09-17 | End: 2022-09-17

## 2022-09-16 RX ORDER — INSULIN LISPRO 100 [IU]/ML
0-4 INJECTION, SOLUTION INTRAVENOUS; SUBCUTANEOUS NIGHTLY
Status: DISCONTINUED | OUTPATIENT
Start: 2022-09-16 | End: 2022-09-20 | Stop reason: HOSPADM

## 2022-09-16 RX ORDER — ONDANSETRON 2 MG/ML
4 INJECTION INTRAMUSCULAR; INTRAVENOUS EVERY 6 HOURS PRN
Status: DISCONTINUED | OUTPATIENT
Start: 2022-09-16 | End: 2022-09-20 | Stop reason: HOSPADM

## 2022-09-16 RX ORDER — PANTOPRAZOLE SODIUM 40 MG/1
40 TABLET, DELAYED RELEASE ORAL
Status: DISCONTINUED | OUTPATIENT
Start: 2022-09-17 | End: 2022-09-20 | Stop reason: HOSPADM

## 2022-09-16 RX ORDER — PANTOPRAZOLE SODIUM 40 MG/1
40 TABLET, DELAYED RELEASE ORAL
Qty: 30 TABLET | Refills: 1 | Status: SHIPPED | OUTPATIENT
Start: 2022-09-16

## 2022-09-16 RX ORDER — TAMSULOSIN HYDROCHLORIDE 0.4 MG/1
0.4 CAPSULE ORAL DAILY
Qty: 30 CAPSULE | Refills: 1 | Status: SHIPPED | OUTPATIENT
Start: 2022-09-17

## 2022-09-16 RX ORDER — ACETAMINOPHEN 650 MG/1
650 SUPPOSITORY RECTAL EVERY 6 HOURS PRN
Status: DISCONTINUED | OUTPATIENT
Start: 2022-09-16 | End: 2022-09-20 | Stop reason: HOSPADM

## 2022-09-16 RX ORDER — ASPIRIN 81 MG/1
81 TABLET, CHEWABLE ORAL DAILY
Status: DISCONTINUED | OUTPATIENT
Start: 2022-09-17 | End: 2022-09-20 | Stop reason: HOSPADM

## 2022-09-16 RX ORDER — INSULIN GLARGINE 100 [IU]/ML
10 INJECTION, SOLUTION SUBCUTANEOUS NIGHTLY
Status: DISCONTINUED | OUTPATIENT
Start: 2022-09-16 | End: 2022-09-20 | Stop reason: HOSPADM

## 2022-09-16 RX ORDER — DEXTROSE MONOHYDRATE 100 MG/ML
INJECTION, SOLUTION INTRAVENOUS CONTINUOUS PRN
Status: DISCONTINUED | OUTPATIENT
Start: 2022-09-16 | End: 2022-09-20 | Stop reason: HOSPADM

## 2022-09-16 RX ADMIN — ASPIRIN 81 MG: 81 TABLET, CHEWABLE ORAL at 11:40

## 2022-09-16 RX ADMIN — PSYLLIUM HUSK 1 PACKET: 3.4 POWDER ORAL at 09:22

## 2022-09-16 RX ADMIN — OXYCODONE HYDROCHLORIDE AND ACETAMINOPHEN 500 MG: 500 TABLET ORAL at 09:01

## 2022-09-16 RX ADMIN — TAMSULOSIN HYDROCHLORIDE 0.4 MG: 0.4 CAPSULE ORAL at 09:01

## 2022-09-16 RX ADMIN — AZITHROMYCIN 250 MG: 250 TABLET, FILM COATED ORAL at 09:01

## 2022-09-16 RX ADMIN — INSULIN LISPRO 1 UNITS: 100 INJECTION, SOLUTION INTRAVENOUS; SUBCUTANEOUS at 11:40

## 2022-09-16 RX ADMIN — SODIUM CHLORIDE, PRESERVATIVE FREE 10 ML: 5 INJECTION INTRAVENOUS at 09:02

## 2022-09-16 NOTE — PROGRESS NOTES
Patient had an 8.38 second pause this AM. Dr. Santos Joseph sent a message, wanted it forwarded on to roe BURKS. Patient was asymptomatic with episode, but was laying down in bed at the time. Dr. Siva Bustillos made aware and wants to continue to monitor patient.

## 2022-09-16 NOTE — PROGRESS NOTES
[facility] here to pick patient up at this time. Isoproterenol drip started per air care at 2mcg/min when here to pick patient up. Patient flown out to [facility] ICU at this time.

## 2022-09-16 NOTE — FLOWSHEET NOTE
09/15/22 2043   Vital Signs   Temp 98.5 °F (36.9 °C)   Temp Source Oral   Heart Rate (!) 104   Heart Rate Source Monitor   Resp 16   /65   BP Location Right upper arm   MAP (Calculated) 88.67   Patient Position Sitting   Level of Consciousness 0   MEWS Score 2   Oxygen Therapy   SpO2 96 %   O2 Device None (Room air)   Pt assessment complete. Pt lying in bed quietly. Lung sounds clear. Pt ST with HR of 104. Krause cath in place draining clear yellow urine. Nightly medications given. Denies needs at this time. Call light within reach.

## 2022-09-16 NOTE — PROGRESS NOTES
Patient and wife given discharge instructions at this time. While going over discharge, patient had a syncopal episode and was unresponsive for about 5 seconds while sitting in the chair. Wife witnessed this event as well. Code called, patient never lost a pulse or respirations, but would not awaken to verbal or physical stimuli. Dr. Delaney Leap into room to see patient at this time. Will continue to monitor.

## 2022-09-16 NOTE — PROGRESS NOTES
Report called to [address] RN at ICU at [facility] at this time for transfer of care for patient.

## 2022-09-16 NOTE — PROGRESS NOTES
Patient resting in bed, AM assessment completed. Lungs clear. BS+. INT intact. Patient on RA, eating breakfast. Patient denies any pain or needs. No acute distress noted. Call light in reach. Will continue to monitor.

## 2022-09-16 NOTE — H&P
Hospital Medicine History & Physical      PCP: Maria E Henson MD    Date of Admission: 9/16/2022    Date of Service: Pt seen/examined on 9/16/22 and Admitted to Inpatient with expected LOS greater than two midnights due to medical therapy. Chief Complaint:  complete heart block      History Of Present Illness:      67 y.o. male with hx of cad, dm2, htn, hld who presented to [facility] with syncope. Pt first came to [facility] for rectal bleeding and workup noted cecal AVMs and was s/p APC. Pt was about to be discharged today when he had 8 sec pause in the morning and another 13sec pause in the afternoon, just prior to dc. Pt had syncopal episode while sitting in the chair. This was witnessed by wife. No prior hx of this. Cardiology was following pt as well at the time and recommended transfer to [facility] for further care. Isuprel ggt was started. Past Medical History:          Diagnosis Date    CAD (coronary artery disease) 3/15/2013    Cancer ([facility] [address]     DDD (degenerative disc disease) 9/25/2012    Diabetes mellitus ([facility] [address]     Herniated disc     Hyperlipidemia 9/25/2012    Hypertension        Past Surgical History:          Procedure Laterality Date    CHOLECYSTECTOMY      COLONOSCOPY  8/13/2014    COLONOSCOPY N/A 9/15/2022    COLONOSCOPY CONTROL HEMORRHAGE performed by Jessa Noguera MD at [facility]      OTHER SURGICAL HISTORY Right 3/3/2016    PORT REMOVAL        Medications Prior to Admission:      Prior to Admission medications    Medication Sig Start Date End Date Taking?  Authorizing Provider   insulin glargine (LANTUS) 100 UNIT/ML injection vial Inject 35 Units into the skin nightly 9/16/22   Yareli Ulloa MD   metFORMIN (GLUCOPHAGE) 1000 MG tablet Take 1 tablet by mouth 2 times daily (with meals) 9/17/22   Yareli Ulloa MD   psyllium (METAMUCIL) 58.12 % PACK packet Take 1 packet by mouth daily (with breakfast) 9/17/22   [facility] Given     Comments               Family History:       Reviewed and negative in regards to presenting illness/complaint. Problem Relation Age of Onset    Heart Disease Mother     Stroke Mother     Cancer Father        REVIEW OF SYSTEMS COMPLETED:   Pertinent positives as noted in the HPI. All other systems reviewed and negative. PHYSICAL EXAM PERFORMED:    BP (!) 161/63   Pulse (!) 104   Temp 98.2 °F (36.8 °C) (Oral)   Resp 21     General appearance:  No apparent distress, appears stated age and cooperative. HEENT:  Normal cephalic, atraumatic without obvious deformity. Pupils equal, round, and reactive to light. Extra ocular muscles intact. Conjunctivae/corneas clear. Neck: Supple, with full range of motion. No jugular venous distention. Trachea midline. Respiratory:  Normal respiratory effort. Clear to auscultation, bilaterally without Rales/Wheezes/Rhonchi. Cardiovascular:  Regular rate and rhythm with normal S1/S2 without murmurs, rubs or gallops. Abdomen: Soft, non-tender, non-distended with normal bowel sounds. Musculoskeletal:  No clubbing, cyanosis or edema bilaterally. Full range of motion without deformity. Skin: Skin color, texture, turgor normal.  No rashes or lesions. Neurologic:  Neurovascularly intact without any focal sensory/motor deficits. Cranial nerves: II-XII intact, grossly non-focal.  Psychiatric:  Alert and oriented, thought content appropriate, normal insight  Capillary Refill: Brisk,3 seconds, normal  Peripheral Pulses: +2 palpable, equal bilaterally       Labs:     Recent Labs     09/14/22  1513 09/14/22  2348 09/15/22  1742 09/15/22  2338 09/16/22  0613   WBC 9.2  --   --   --   --    HGB 14.9   < > 13.3* 12.6* 12.6*   HCT 42.6   < > 38.6* 36.2* 36.3*     --   --   --   --     < > = values in this interval not displayed.      Recent Labs     09/14/22  1513 09/16/22  0613   * 136   K 4.4 4.3   CL 96* 101   CO2 27 24   BUN 15 10   CREATININE 1.0 0. 8   CALCIUM 9.1 8.5     Recent Labs     09/14/22  1513   AST 17   ALT 13   BILITOT 0.7   ALKPHOS 87     Recent Labs     09/14/22  1513 09/15/22  0552 09/15/22  0809   INR 1.15* >16.29* 1.11     Recent Labs     09/14/22  1513 09/15/22  0944 09/15/22  1154   TROPONINI <0.01 <0.01 <0.01       Urinalysis:      Lab Results   Component Value Date/Time    NITRU Negative 09/14/2022 05:40 PM    BLOODU Negative 09/14/2022 05:40 PM    SPECGRAV <=1.005 09/14/2022 05:40 PM    GLUCOSEU >=1000 09/14/2022 05:40 PM       Radiology:     CXR: I have reviewed the CXR with the following interpretation: na  EKG:  I have reviewed the EKG with the following interpretation: na    No orders to display       Consults:    IP CONSULT TO CARDIOLOGY    ASSESSMENT:    Active Hospital Problems    Diagnosis Date Noted    Complete heart block ([facility] [address] [I44.2] 09/16/2022     Priority: Medium         PLAN:    Syncope- with concern for complete ht block/sinus pauses  -on isuprel ggt  -EP consulted, apprec recs  -placed in ICU    Abdominal pain - likely from GI bleed -> BRBPR  -monitor h/h  -CTA abdomen with no active bleeding, did show evidence of diverticulosis   -IV PPI was required  -GI consulted,  colonoscopy done 9/15( single non-bleeding Cecal AVM s/p APC, diverticulosis, hemorrhoids. No active bleeding)    New RBBB and Left posterior fascicular block   -noted at [facility]  -cards on board and managing    CAD with stents   -no CP   -cardiology consulted   - on ASA and statin at home,      DM -appears controlled  -was holding oral regimen   -on lantus at dec'd dose  - Cont low SSI   -continue to monitor BG      HTN   -held monopril   -holding oral meds      HLD -on statin      Lymphoma, in remission        DVT Prophylaxis: scd given recent gib  Diet: ADULT DIET;  Regular; Low Fat/Low Chol/High Fiber/2 gm Na  Code Status: Full Code    PT/OT Eval Status: not ordered    Dispo - icu care       Tabitha Zelaya MD    Thank you Adriana Aguilar MD for the

## 2022-09-16 NOTE — PROGRESS NOTES
PROGRESS NOTE  S:76 yrs Patient  admitted on 9/14/2022 with Diverticulosis [K57.90]  GI bleed [K92.2]  Gastrointestinal hemorrhage, unspecified gastrointestinal hemorrhage type [K92.2] . Today he feels well. He is tolerating diet. Exam:   Vitals:    09/16/22 0843   BP: (!) 145/68   Pulse: 96   Resp: 18   Temp: 98.2 °F (36.8 °C)   SpO2: 97%      General appearance: alert, appears stated age, cooperative, no distress, and pale  HEENT: Neck supple with midline trachea  Neck: supple, symmetrical, trachea midline  Lungs: clear to auscultation bilaterally  Heart: regular rate and rhythm, S1, S2 normal, no murmur, click, rub or gallop  Abdomen: soft, non-tender; bowel sounds normal; no masses,  no organomegaly  Extremities: extremities normal, atraumatic, no cyanosis or edema     Medications: Reviewed    Labs:  CBC:   Recent Labs     09/14/22  1513 09/14/22  2348 09/15/22  1742 09/15/22  2338 09/16/22  0613   WBC 9.2  --   --   --   --    HGB 14.9   < > 13.3* 12.6* 12.6*   HCT 42.6   < > 38.6* 36.2* 36.3*   MCV 92.6  --   --   --   --      --   --   --   --     < > = values in this interval not displayed. BMP:   Recent Labs     09/14/22  1513 09/16/22  0613   * 136   K 4.4 4.3   CL 96* 101   CO2 27 24   BUN 15 10   CREATININE 1.0 0.8     LIVER PROFILE:   Recent Labs     09/14/22  1513   AST 17   ALT 13   LIPASE 39.0   PROT 7.2   BILITOT 0.7   ALKPHOS 87     PT/INR:   Recent Labs     09/14/22  1513 09/15/22  0552 09/15/22  0809   INR 1.15* >16.29* 1.11       Date of Procedure: 9/15/2022  Pre-Op Diagnosis: RECTAL BLEEDING  Post-Op Diagnosis:  single non-bleeding Cecal AVM s/p APC, diverticulosis, hemorrhoids. No active bleeding  Procedure(s): COLONOSCOPY CONTROL HEMORRHAGE  Surgeon(s): Brandon Zhao MD      IMAGING:  CTA ABDOMEN PELVIS W WO CONTRAST   Final Result   1.  No evidence of active extravasation into the gastrointestinal tract during   the evaluation to suggest active gastrointestinal bleeding. 2. No evidence of bowel obstruction or perforation. Diverticulosis worse in   the sigmoid but no clear evidence of acute diverticulitis. Small bowel   appears unremarkable. 3. Distention of the urinary bladder. 4. No evidence of aortic aneurysm or dissection. Mild atherosclerotic   disease. CT HEAD WO CONTRAST   Final Result   1. No acute intracranial hemorrhage. 2. Mild global cortical atrophy with moderate chronic microvascular ischemic   changes. 3. Chronic sinusitis. Impression: 67year old male with a history of DM, HTN, HLD, DDD, CAD on Plavix, s/p cholecystectomy, and lymphoma in remission admitted with BRBPR and diarrhea concerning for diverticular bleed, AVMs, hemorrhoid bleeding, vs ischemic colitis. Colonoscopy showed cecal AVM s/p APC, diverticulosis, and hemorrhoids. CTA without active bleeding.        Recommendation:  Continue supportive care  Monitor Hgb  Observe for signs of bleeding  Monitor and document output  Recommend Pantoprazole 40 mg qAM  Bowel regimen with probiotics and Metamucil daily  Continue high fiber diet as tolerated  Ok to d/c from GI standpoint  Repeat CBC in 2-4 weeks upon d/c       Madelaine Diamond PA-C  3:08 PM 9/16/2022

## 2022-09-16 NOTE — PROGRESS NOTES
Patient to be air cared out to cath lab at [facility]. The helicopter will be here in 13-17 minutes to take patient there. Patient's wife made aware. Will continue to monitor.

## 2022-09-16 NOTE — PROGRESS NOTES
CARDIOLOGY PROGRESS NOTE        Patient Name: Marysol Small  Date of admission: 9/14/2022  2:46 PM  Admission Dx: Diverticulosis [K57.90]  GI bleed [K92.2]  Gastrointestinal hemorrhage, unspecified gastrointestinal hemorrhage type [K92.2]  Requesting Physician: Crys King MD  Primary Care physician: Nimisha Monroy MD    Reason for Consultation/Chief Complaint: Pause/block    History of Present Illness:     Marysol Small is a 67 y.o. patient with a prior medical history notable for coronary artery disease with prior PCI LAD and subsequently RCA  2018, diabetes mellitus, hypertension, hyperlipidemia, and lymphoma in remission, who presented to the hospital with complaints of BRBPR. Cardiology consulted for abnormal EKG. Pt was evaluated 9/15 for first deg AVB/bifascicular block, history of coronary artery disease. He was noted stable. He went through endoscopy yesterday showed non-bleeding cecal AVMs status post APC, diverticulosis, hemorrhoids. Pt was noted to have 8.4 s pause by tele 06:37 this AM. Tele showed sinus slowing and high grade AV block. Pt likely sleeping at the time and was awoke by RN and told he had had a pause. He notes he did awake suddenly this AM with confusion prior to that - didn't know where he was. No dizziness/chest pain or dyspnea this AM. No nausea/vomiting/straining. No AVN blockers/heart slowing meds given . Past Medical History:   has a past medical history of CAD (coronary artery disease), Cancer ([facility] [address] DDD (degenerative disc disease), Diabetes mellitus ([facility] [address] Herniated disc, Hyperlipidemia, and Hypertension. Surgical History:   has a past surgical history that includes Cholecystectomy; Coronary angioplasty with stent; Colonoscopy (8/13/2014); other surgical history (Right, 3/3/2016); and Colonoscopy (N/A, 9/15/2022). Social History:   reports that he has quit smoking.  He has quit using smokeless tobacco. He reports that he does not drink alcohol and does not use drugs. Family History:  family history includes Cancer in his father; Heart Disease in his mother; Stroke in his mother. Home Medications:  Were reviewed and are listed in nursing record and/or below  Prior to Admission medications    Medication Sig Start Date End Date Taking? Authorizing Provider   alogliptin (NESINA) 25 MG TABS tablet Take 25 mg by mouth daily   Yes Historical Provider, MD   diclofenac sodium (VOLTAREN) 1 % GEL Apply topically 4 times daily as needed for Pain   Yes Historical Provider, MD   empagliflozin (JARDIANCE) 25 MG tablet Take 25 mg by mouth daily   Yes Historical Provider, MD   Multiple Vitamins-Minerals (THERAPEUTIC MULTIVITAMIN-MINERALS) tablet Take 1 tablet by mouth daily   Yes Historical Provider, MD   ascorbic acid (VITAMIN C) 500 MG tablet Take 500 mg by mouth daily   Yes Historical Provider, MD   Coenzyme Q10 (CO Q 10) 10 MG CAPS Take 1 capsule by mouth daily   Yes Historical Provider, MD   magnesium oxide (MAG-OX) 400 MG tablet Take 420 mg by mouth daily   Yes Historical Provider, MD   aspirin 81 MG tablet Take 81 mg by mouth daily. Historical Provider, MD   fosinopril (MONOPRIL) 40 MG tablet Take 40 mg by mouth daily. Historical Provider, MD   nitroGLYCERIN (NITROSTAT) 0.4 MG SL tablet Place 0.4 mg under the tongue every 5 minutes as needed. Historical Provider, MD   rosuvastatin (CRESTOR) 20 MG tablet Take 20 mg by mouth daily    Historical Provider, MD   insulin glargine (LANTUS) 100 UNIT/ML injection Inject 40 Units into the skin nightly. Patient taking differently: Inject 35 Units into the skin nightly 9/25/12   Alka Gagnon MD   fish oil-omega-3 fatty acids 1000 MG capsule Take 1 g by mouth daily 2capsule in the morning. One at night    Historical Provider, MD   metFORMIN (GLUCOPHAGE) 1000 MG tablet Take 1,000 mg by mouth 2 times daily (with meals).       Historical Provider, MD        CURRENT Medications:  psyllium (METAMUCIL) 58.12 % packet 1 packet, Daily with breakfast  azithromycin (ZITHROMAX) tablet 250 mg, Daily  bisacodyl (DULCOLAX) EC tablet 20 mg, Once  polyethylene glycol (GLYCOLAX) packet 17 g, Daily PRN  sodium chloride flush 0.9 % injection 5-40 mL, 2 times per day  sodium chloride flush 0.9 % injection 5-40 mL, PRN  0.9 % sodium chloride infusion, PRN  ondansetron (ZOFRAN-ODT) disintegrating tablet 4 mg, Q8H PRN   Or  ondansetron (ZOFRAN) injection 4 mg, Q6H PRN  acetaminophen (TYLENOL) tablet 650 mg, Q6H PRN   Or  acetaminophen (TYLENOL) suppository 650 mg, Q6H PRN  pantoprazole (PROTONIX) 40 mg in sodium chloride (PF) 10 mL injection, Q24H  ascorbic acid (VITAMIN C) tablet 500 mg, Daily  insulin glargine (LANTUS) injection vial 10 Units, Nightly  glucose chewable tablet 16 g, PRN  dextrose bolus 10% 125 mL, PRN   Or  dextrose bolus 10% 250 mL, PRN  glucagon (rDNA) injection 1 mg, PRN  dextrose 10 % infusion, Continuous PRN  insulin lispro (HUMALOG) injection vial 0-4 Units, TID WC  insulin lispro (HUMALOG) injection vial 0-4 Units, Nightly  tamsulosin (FLOMAX) capsule 0.4 mg, Daily      Allergies:  Doxycycline     Review of Systems:   A 14 point review of symptoms completed. Pertinent positives identified in the HPI, all other review of symptoms negative as below.       Objective:     Vitals:    09/15/22 1530 09/15/22 2043 09/16/22 0200 09/16/22 0843   BP: 138/65 136/65 (!) 125/58 (!) 145/68   Pulse: (!) 102 (!) 104 82 96   Resp: 16 16 16 18   Temp: 97.3 °F (36.3 °C) 98.5 °F (36.9 °C) 97.9 °F (36.6 °C) 98.2 °F (36.8 °C)   TempSrc: Oral Oral Oral Oral   SpO2:  96% 97% 97%   Weight:   175 lb 8 oz (79.6 kg)    Height:          Weight: 175 lb 8 oz (79.6 kg)       PHYSICAL EXAM:    General:  Alert, cooperative, no distress, appears stated age   Head:  Normocephalic, atraumatic   Eyes:  Conjunctiva/corneas clear, anicteric sclerae    Nose: Nares normal, no drainage or sinus tenderness   Throat: No abnormalities of the lips, oral mucosa or tongue. Neck: Trachea midline. Neck supple with no lymphadenopathy, thyroid not enlarged, symmetric, no tenderness/mass/nodules    Lungs:   Clear to auscultation bilaterally, no wheezes, no rales, no respiratory distress   Chest Wall:  No deformity or tenderness to palpation   Heart:  Regular rate and rhythm, normal S1, normal S2, soft systolic ejection murmur LSB, no rub, no S3/S4, PMI non-displaced. Abdomen:   Soft, protuberant, with normoactive bowel sounds. No masses, no hepatosplenomegaly   Extremities: No cyanosis, clubbing or pitting edema. Venous stasis changes. Vascular: 2+ radial,2+ dorsalis pedis and posterior tibial pulses bilaterally. Brisk carotid upstrokes without carotid bruit. Skin: Venous stasis changes bilat, WWP   Pysch: Euthymic mood, appropriate affect   Neurologic: Oriented to person, place and time. No slurred speech or facial asymmetry. No motor or sensory deficits on gross examination.          Labs:   CBC:   Lab Results   Component Value Date/Time    WBC 9.2 09/14/2022 03:13 PM    RBC 4.60 09/14/2022 03:13 PM    RBC 5.01 02/20/2017 12:50 PM    HGB 12.6 09/16/2022 06:13 AM    HCT 36.3 09/16/2022 06:13 AM    MCV 92.6 09/14/2022 03:13 PM    RDW 12.9 09/14/2022 03:13 PM     09/14/2022 03:13 PM     CMP:  Lab Results   Component Value Date/Time     09/16/2022 06:13 AM    K 4.3 09/16/2022 06:13 AM     09/16/2022 06:13 AM    CO2 24 09/16/2022 06:13 AM    BUN 10 09/16/2022 06:13 AM    CREATININE 0.8 09/16/2022 06:13 AM    GFRAA >60 09/16/2022 06:13 AM    GFRAA >60 04/03/2013 12:30 PM    AGRATIO 1.2 09/14/2022 03:13 PM    LABGLOM >60 09/16/2022 06:13 AM    GLUCOSE 132 09/16/2022 06:13 AM    GLUCOSE 219 08/22/2016 01:17 PM    PROT 7.2 09/14/2022 03:13 PM    PROT 6.4 08/22/2016 01:17 PM    CALCIUM 8.5 09/16/2022 06:13 AM    BILITOT 0.7 09/14/2022 03:13 PM    ALKPHOS 87 09/14/2022 03:13 PM    AST 17 09/14/2022 03:13 PM    ALT 13 09/14/2022 03:13 PM     PT/INR:  No results found for: PTINR  HgBA1c:  Lab Results   Component Value Date    LABA1C 6.8 09/14/2022     Lab Results   Component Value Date    TROPONINI <0.01 09/15/2022       No results found for: CHOL  No results found for: TRIG  No results found for: HDL  No results found for: LDLCHOLESTEROL, LDLCALC  No results found for: LABVLDL, VLDL  No results found for: [facility]     Cardiac Data:     EKG: personally reviewed as above. Telemetry personally reviewed:8.4 S pause as above      Echo [facility] 2018  Left Ventricle: The left ventricular function is low normal. Overall   left ventricular ejection fraction is estimated to be 50-55%. The   left ventricle is normal in size. There is normal left ventricular   wall thickness. There is borderline global hypokinesis of the left   ventricle. No left ventricular thrombus detected. Right Ventricle: The right ventricle is normal size. There is normal   right ventricular wall thickness. The right ventricular systolic   function is normal.     Left Atrium: The left atrial size is normal.     Right Atrium: Right atrial size is normal.     Mitral Valve: The mitral valve leaflets are mildly calcified in   appearance. There is no mitral regurgitation noted. There is no   evidence of mitral valve prolapse. There is no mitral valve stenosis. Aortic Valve: The Aortic Valve leaflets appear sclerotic. No aortic   regurgitation is present. No hemodynamically significant valvular   aortic stenosis. Aortic valve peak gradient is 4.8 mmHg. Aortic valve   mean gradient is 2.5 mmHg. Aortic valve area is 3.7 cm^2. Aortic Root: The aortic root is normal size. Tricuspid Valve: The tricuspid valve is normal in structure and   function. Trace tricuspid regurgitation is present. Diastolic Function: The diastolic function is impaired and classified   as Grade 1 (impaired relaxation). Pulmonic Valve/Pulmonary Artery: The pulmonic valve is normal in   structure.  There is trace pulmonic valvular regurgitation. Pericardium: There is no pericardial effusion. There is no pleural   effusion. Cardiac catheterization: 2018    Findings: Aortic Pressure: 93/13 mmHg. Left Ventricle:  EF=50%,  Wall motion: Normal.  LVEDP=10 mmHg. No aortic valve gradient seen. Coronary angiogram:   Left Main Trunk (LMT): Normal   Left Anterior Descending (LAD): proximal 30% stenosis, patent mid   stent, distal luminal irregularities   Ramus: 60-70% stenosis, small 2mm vessel   Left Circumflex (LCX): very small 1.5mm, proximal 90% stenosis   Right Coronary (RCA): dominant vessel, sub-totaled 99% mid   chronic occlusion with extensive left to right collaterals   collaterals     PCI of the RCA Artery       IMPRESSION:   1) 3 vessel CAD with patent LAD stent,  of RCA and diffusely   disease small Left Circumflex   2) Low normal LVEF   3) Normal LVEDP   4) Successful PCI of  of Right Coronary Artery with 3   overlapping drug eluting stents (Synergy 3.0x28mm post dilated to   3.4mm, Synergy 3.0x38mm and Synergy 3.0x38mm)      Additional studies:     Impression and Plan:        Sinus slowing/onset High grade AV block by tele; no clear vagal at time. Likely sleeping. Bifascicular block (RBBB/LPFB)  Hx 1st deg AVB hx  -will discuss with EP; possible transfer to [facility]. Pt is willing to stay for evaluation if necessary   -monitor tele  -avoid AVN blockers  -echo if transfer and possible device planned  -keep NPO    Coronary artery disease with prior PCI LAD and subsequently RCA  2018  -continue aspirin, crestor, jardiance, fish oil  -no anti-anginals at this time per OP med list   -Follows with Dr. Melanie Rose  -restart aspirin on DC   -no active angina      Hypertension   -elevated in this setting  -longterm goal < 130/80    Hyperlipidemia  -LDL 40,  12/2021    Diabetes mellitus  -a1c 7.6 12/2021     Lower GI bleed  -AVM status post APC    Follows with Dr. Melanie Rose. Patient Active Problem List   Diagnosis    Hyperlipidemia    Diabetes mellitus ([facility] [address]    Lymphoma ([facility] [address]    DDD (degenerative disc disease)    Chronic bronchitis (HCC)    Psoriasis    CAD (coronary artery disease)    History of non-Hodgkin's lymphoma    GI bleed    Diverticulosis    Primary hypertension         I will address the patient's cardiac risk factors and adjusted pharmacologic treatment as needed. In addition, I have reinforced the need for patient directed risk factor modification. All questions and concerns were addressed to the patient/family. Alternatives to my treatment were discussed. Thank you for allowing us to participate in the care of Shane Rodriguez. Please call me with any questions [phone]. Sherrilyn Kawasaki, MD, [facility]  Cardiovascular Disease  [place] [address]  [phone] [facility]  [phone] [address] [place] [address]  9/16/2022 9:24 AM      ADDENDUM:  Case discussed with Dr. Saad Moran. Simultaneous sinus slowing/AV block, possible underlying obstructive sleep apnea contributing. Suspected clinically. Will send with 1 month cardiac monitor. Spoke with general cardiologist Dr. Yennifer Mccracken. He will arrange expedited EP evaluation. Advised not to drive.

## 2022-09-16 NOTE — DISCHARGE SUMMARY
Name:  Prakash De La Torre  Room:  /2536-69  MRN:    [id number]    Discharge Summary      This discharge summary is in conjunction with a complete physical exam done on the day of discharge. Attending Physician: Dr. Lady Stoddard  Discharging Physician: Dr. Isabel [id number]  Discharge:  2022    HPI:  The patient is a 67 y.o. male with pmhx of CAD, DM, HTN, HLD, lymphoma in remission who presented to [facility] ED with complaint of abdominal pain and bright red blood per rectum. Patient describes multiple episodes of rectal bleeding yesterday, at least 6-7 times yesterday. He had an abdominal cramp associated with a bloody BM. No nausea or vomiting. No fevers. Episode of rectal bleeding on arrival to the  ED with hypotension and diaphoresis, blood pressures improved with IV fluids  Vital signs remained stable in the ED, hemoglobin stable at 14.9.->  Patient admitted to telemetry floor. GI consulted and plans for colonoscopy in a.m. CTA of abdomen and pelvis in ED did not show any acute bleed, showed diverticulosis,      Overnight vital signs remained stable, patient has not had any more rectal bleeding, he complains of more abdominal bloating and discomfort today from colon prep. Blood pressure and hemoglobin remained stable  Patient has EKG changes with new bundle branch block, he states that he has had previous coronary stent, denies any chest pain or shortness of breath today. Diagnoses this Admission and Hospital Course:    #Abdominal pain   #GI bleed -> BRBPR  -Hgb 14.9 --> 13.1   -continue to trend H & H   -CTA abdomen with no active bleeding, did show evidence of diverticulosis   -NPO   -IVF   -IV PPI   -GI consulted, planning for colonoscopy    S/P colonoscopy on 9/15/22   Pre-Op Diagnosis: RECTAL BLEEDING  Post-Op Diagnosis:  single non-bleeding Cecal AVM s/p APC, diverticulosis, hemorrhoids.  No active bleeding  Procedure(s):  COLONOSCOPY CONTROL HEMORRHAGE     #Hyperlactatemia -2.7  -will repeat   -IVF      #New RBBB and Left posterior fascicular block   # H/O CAD with stents   -initial trop <0.01  -repeat troponin pending   -no CP   -cardiology consulted   - on ASA and statin at home, holding      #Elevated PT/INR -> lab error. PT and INR rechecked and is normal now. Patient is not on Coumadin therapy     #DM   -holding oral regimen   -on lantus -> getting decreased dose as patient is n.p.o. , will need to increase back to his home dose once diet started   - Cont low SSI   -continue to monitor BG      #HTN   -on monopril   -holding oral meds      #HLD   -on statin      #Lymphoma, in remission      DVT Prophylaxis: SCDs        Patient does not have any more active bleeding, tolerated advance and diet . We discontinued Krause      Seen by cardiology. Episode of 8-second pause on telemetry last night patient asymptomatic. Troponins negative on admission. Cardiology discussing about possible  EP eval.      Blood cultures 2 out of 2 positive for coag negative staph. Patient was on outpatient Zithromax continued to complete course. Disposition either transferred to [facility] [place] for EP eval versus discharge with outpatient cardiac follow-up. Will discuss  further care with cardiology. Addendum per cardiology   11 AM      ADDENDUM:  Case discussed with Dr. Rodriguez Caraballo. Simultaneous sinus slowing/AV block, possible underlying obstructive sleep apnea contributing. Suspected clinically. Will send with 1 month cardiac monitor. Spoke with general cardiologist Dr. Lamont Campbell. He will arrange expedited EP evaluation. Advised not to drive. Addendum   4 pm     syncope -> reevaluated by cardiology  Patient was ready for discharge. Tele monitor was removed. RN was going through his discharge instructions when patient passed out. Unclear if he had a bradycardic episode again- as no no monitor in place .   Cardiology evaluated the patient , he has discussed with cardiac Cath Lab at [facility]. Pt  to be transferred to [facility] now for cardiac cath and EP eval   Start isoproterenol gtt            I spoke to Dr. Beth Jesus - hospitalist at [facility]       Pt to be transferred to [facility] ICU           Procedures (Please Review Full Report for Details)  COLONOSCOPY CONTROL HEMORRHAGE  single non-bleeding Cecal AVM s/p APC, diverticulosis, hemorrhoids. No active bleeding    Consults    Cardiology  GI      Physical Exam at Discharge:    BP (!) 145/68   Pulse 96   Temp 98.2 °F (36.8 °C) (Oral)   Resp 18   Ht 5' 10\" (1.778 m)   Wt 175 lb 8 oz (79.6 kg)   SpO2 97%   BMI 25.18 kg/m²   General:  Awake, alert, NAD  Skin:  Warm and dry  Neck:  JVD absent. Neck supple  Chest:  Clear to auscultation, respiration easy. No wheezes, rales or rhonchi. Cardiovascular:  RRR ,S1S2 normal  Abdomen:  Soft, non tender, non distended, BS +  Extremities:  No edema. Intact peripheral pulses. Brisk cap refill, < 2 secs  Neuro: non focal       CBC:   Recent Labs     09/14/22  1513 09/14/22  2348 09/15/22  1742 09/15/22  2338 09/16/22  0613   WBC 9.2  --   --   --   --    HGB 14.9   < > 13.3* 12.6* 12.6*   HCT 42.6   < > 38.6* 36.2* 36.3*   MCV 92.6  --   --   --   --      --   --   --   --     < > = values in this interval not displayed.      BMP:   Recent Labs     09/14/22  1513 09/16/22  0613   * 136   K 4.4 4.3   CL 96* 101   CO2 27 24   BUN 15 10   CREATININE 1.0 0.8     LIVER PROFILE:   Recent Labs     09/14/22  1513   AST 17   ALT 13   LIPASE 39.0   BILITOT 0.7   ALKPHOS 87     PT/INR:   Recent Labs     09/14/22  1513 09/15/22  0552 09/15/22  0809   PROTIME 14.5 >120.0* 14.1   INR 1.15* >16.29* 1.11     APTT:   Recent Labs     09/15/22  0552   APTT 38.4*     UA:  Recent Labs     09/14/22  1740   COLORU Yellow   PHUR 7.0   CLARITYU Clear   SPECGRAV <=1.005   LEUKOCYTESUR Negative   UROBILINOGEN 0.2   BILIRUBINUR Negative   BLOODU Negative   GLUCOSEU >=1000*       CARDIAC ENZYMES  Recent Labs 09/14/22  1513 09/15/22  0944 09/15/22  1154   TROPONINI <0.01 <0.01 <0.01         CULTURES  Blood: staph coagulase negative  COVID: not detected    RADIOLOGY  CTA ABDOMEN PELVIS W WO CONTRAST   Final Result   1. No evidence of active extravasation into the gastrointestinal tract during   the evaluation to suggest active gastrointestinal bleeding. 2. No evidence of bowel obstruction or perforation. Diverticulosis worse in   the sigmoid but no clear evidence of acute diverticulitis. Small bowel   appears unremarkable. 3. Distention of the urinary bladder. 4. No evidence of aortic aneurysm or dissection. Mild atherosclerotic   disease. CT HEAD WO CONTRAST   Final Result   1. No acute intracranial hemorrhage. 2. Mild global cortical atrophy with moderate chronic microvascular ischemic   changes. 3. Chronic sinusitis.                Discharge Medications     Medication List        START taking these medications      azithromycin 250 MG tablet  Commonly known as: Marylouise Lamont  Start taking on: September 17, 2022     pantoprazole 40 MG tablet  Commonly known as: PROTONIX  Take 1 tablet by mouth every morning (before breakfast)     psyllium 58.12 % Pack packet  Commonly known as: METAMUCIL  Take 1 packet by mouth daily (with breakfast)  Start taking on: September 17, 2022     tamsulosin 0.4 MG capsule  Commonly known as: FLOMAX  Take 1 capsule by mouth daily  Start taking on: September 17, 2022            CONTINUE taking these medications      alogliptin 25 MG Tabs tablet  Commonly known as: NESINA     ascorbic acid 500 MG tablet  Commonly known as: VITAMIN C     aspirin 81 MG tablet     Co Q 10 10 MG Caps     diclofenac sodium 1 % Gel  Commonly known as: VOLTAREN     empagliflozin 25 MG tablet  Commonly known as: JARDIANCE     fish oil-omega-3 fatty acids 1000 MG capsule     fosinopril 40 MG tablet  Commonly known as: MONOPRIL     Lantus 100 UNIT/ML injection vial  Generic drug: insulin glargine magnesium oxide 400 MG tablet  Commonly known as: MAG-OX     metFORMIN 1000 MG tablet  Commonly known as: GLUCOPHAGE  Start taking on: September 17, 2022     nitroGLYCERIN 0.4 MG SL tablet  Commonly known as: NITROSTAT     rosuvastatin 20 MG tablet  Commonly known as: CRESTOR     therapeutic multivitamin-minerals tablet            STOP taking these medications      clopidogrel 75 MG tablet  Commonly known as: PLAVIX     dicyclomine 10 MG capsule  Commonly known as: Bentyl     glipiZIDE 10 MG tablet  Commonly known as: GLUCOTROL     HYDROcodone-acetaminophen 5-325 MG per tablet  Commonly known as: Norco     metoprolol tartrate 50 MG tablet  Commonly known as: LOPRESSOR     ondansetron 4 MG disintegrating tablet  Commonly known as: Zofran ODT               Where to Get Your Medications        These medications were sent to [id number] [place], [place] [id number]      Phone: [phone]   pantoprazole 40 MG tablet  psyllium 58.12 % Pack packet  tamsulosin 0.4 MG capsule           Discharged and transferred in critical  condition to  [facility]   Total time 55 minutes. > 50%  dominated by counseling and coordination of care.            Shashank Mcghee MD

## 2022-09-16 NOTE — PROGRESS NOTES
Pt is lying in bed with their eyes closed. Respirations are easy and even. Call light within reach bed in lowest position with the wheels locked. Will continue to monitor.  Charu Hackett RN

## 2022-09-16 NOTE — TELEPHONE ENCOUNTER
Monitor placed by GEENA Doll at discharge  Monitor company [employer]  Length of monitor 2 weeks  Monitor ordered by Addie Bar  Serial number   Activation successful prior to pt leaving office?  Yes before discharge

## 2022-09-16 NOTE — CARE COORDINATION
INTERDISCIPLINARY PLAN OF CARE CONFERENCE    Date/Time: 9/16/2022 11:03 AM  Completed by: Naz Tanner RN, Case Management      Patient Name:  Hemant Barba  YOB: 1945  Admitting Diagnosis: Diverticulosis [K57.90]  GI bleed [K92.2]  Gastrointestinal hemorrhage, unspecified gastrointestinal hemorrhage type [K92.2]     Admit Date/Time:  9/14/2022  2:46 PM    Chart reviewed. Interdisciplinary team contacted or reviewed plan related to patient progress and discharge plans. Disciplines included Case Management, Nursing, and Dietitian. Current Status: Inpatient  PT/OT recommendation for discharge plan of care: n/a     Expected D/C Disposition:  Home  Confirmed plan with patient and/or family Yes confirmed with: patient and spouse at bedside. Discharge Plan Comments: CM reviewed chart and met with patient and spouse at bedside. Plan is to return home at discharge.        Home O2 in place on admit: No  Pt informed of need to bring portable home O2 tank on day of discharge for nursing to connect prior to leaving:  Not Indicated  Verbalized agreement/Understanding:  Not Indicated

## 2022-09-16 NOTE — PROGRESS NOTES
Monitor tech called this RN to check on Pt. Pt had 8.38 second pause. Upon entering Pt room, Pt lying in bed quietly. No s/s of distress. Pt denies any CP or discomfort at this time. Pt alert and oriented x 4. Pt currently NSR with 1st degree block. HR in the 70's. Dr. Arnulfo Combs.

## 2022-09-17 LAB
BLOOD CULTURE, ROUTINE: ABNORMAL
BLOOD CULTURE, ROUTINE: ABNORMAL
CULTURE, BLOOD 2: ABNORMAL
GLUCOSE BLD-MCNC: 158 MG/DL (ref 70–99)
GLUCOSE BLD-MCNC: 158 MG/DL (ref 70–99)
GLUCOSE BLD-MCNC: 190 MG/DL (ref 70–99)
GLUCOSE BLD-MCNC: 224 MG/DL (ref 70–99)
LV EF: 43 %
LVEF MODALITY: NORMAL
ORGANISM: ABNORMAL
PERFORMED ON: ABNORMAL

## 2022-09-17 PROCEDURE — 2700000000 HC OXYGEN THERAPY PER DAY

## 2022-09-17 PROCEDURE — 94761 N-INVAS EAR/PLS OXIMETRY MLT: CPT

## 2022-09-17 PROCEDURE — 2580000003 HC RX 258: Performed by: INTERNAL MEDICINE

## 2022-09-17 PROCEDURE — 93306 TTE W/DOPPLER COMPLETE: CPT

## 2022-09-17 PROCEDURE — 99232 SBSQ HOSP IP/OBS MODERATE 35: CPT | Performed by: INTERNAL MEDICINE

## 2022-09-17 PROCEDURE — 2000000000 HC ICU R&B

## 2022-09-17 PROCEDURE — 6370000000 HC RX 637 (ALT 250 FOR IP): Performed by: INTERNAL MEDICINE

## 2022-09-17 RX ORDER — GUAIFENESIN 600 MG/1
600 TABLET, EXTENDED RELEASE ORAL 2 TIMES DAILY
Status: DISCONTINUED | OUTPATIENT
Start: 2022-09-17 | End: 2022-09-20 | Stop reason: HOSPADM

## 2022-09-17 RX ADMIN — ASPIRIN 81 MG 81 MG: 81 TABLET ORAL at 09:17

## 2022-09-17 RX ADMIN — GUAIFENESIN 600 MG: 600 TABLET, EXTENDED RELEASE ORAL at 20:23

## 2022-09-17 RX ADMIN — PANTOPRAZOLE SODIUM 40 MG: 40 TABLET, DELAYED RELEASE ORAL at 09:18

## 2022-09-17 RX ADMIN — SODIUM CHLORIDE, PRESERVATIVE FREE 10 ML: 5 INJECTION INTRAVENOUS at 10:01

## 2022-09-17 RX ADMIN — AZITHROMYCIN MONOHYDRATE 250 MG: 250 TABLET ORAL at 09:22

## 2022-09-17 RX ADMIN — GUAIFENESIN 600 MG: 600 TABLET, EXTENDED RELEASE ORAL at 12:27

## 2022-09-17 RX ADMIN — SODIUM CHLORIDE, PRESERVATIVE FREE 10 ML: 5 INJECTION INTRAVENOUS at 20:25

## 2022-09-17 RX ADMIN — INSULIN GLARGINE 10 UNITS: 100 INJECTION, SOLUTION SUBCUTANEOUS at 20:23

## 2022-09-17 RX ADMIN — TAMSULOSIN HYDROCHLORIDE 0.4 MG: 0.4 CAPSULE ORAL at 09:18

## 2022-09-17 RX ADMIN — OXYCODONE HYDROCHLORIDE AND ACETAMINOPHEN 500 MG: 500 TABLET ORAL at 09:18

## 2022-09-17 ASSESSMENT — PAIN SCALES - GENERAL
PAINLEVEL_OUTOF10: 0
PAINLEVEL_OUTOF10: 0

## 2022-09-17 NOTE — PROGRESS NOTES
Hospitalist Progress Note      PCP: Amada Buchanan MD    Date of Admission: 9/16/2022    Chief Complaint:  complete heart block       Hospital Course: reviewed     Subjective: resting in bed, off isuprel ggt, no complaints      Medications:  Reviewed    Infusion Medications    isoproterenol (ISUPREL) infusion      sodium chloride      dextrose       Scheduled Medications    ascorbic acid  500 mg Oral Daily    aspirin  81 mg Oral Daily    insulin glargine  10 Units SubCUTAneous Nightly    insulin lispro  0-4 Units SubCUTAneous TID WC    insulin lispro  0-4 Units SubCUTAneous Nightly    pantoprazole  40 mg Oral QAM AC    psyllium  1 packet Oral Daily with breakfast    sodium chloride flush  5-40 mL IntraVENous 2 times per day    tamsulosin  0.4 mg Oral Daily     PRN Meds: sodium chloride, acetaminophen **OR** acetaminophen, dextrose, dextrose bolus **OR** dextrose bolus, glucagon (rDNA), glucose, ondansetron **OR** ondansetron, sodium chloride flush, polyethylene glycol, perflutren lipid microspheres      Intake/Output Summary (Last 24 hours) at 9/17/2022 0939  Last data filed at 9/17/2022 0928  Gross per 24 hour   Intake --   Output 1625 ml   Net -1625 ml       Physical Exam Performed:    /69   Pulse 79   Temp 97.9 °F (36.6 °C) (Oral)   Resp 20   SpO2 99%     General appearance: No apparent distress, appears stated age and cooperative. HEENT: Pupils equal, round, and reactive to light. Conjunctivae/corneas clear. Neck: Supple, with full range of motion. No jugular venous distention. Trachea midline. Respiratory:  Normal respiratory effort. Clear to auscultation, bilaterally without Rales/Wheezes/Rhonchi. Cardiovascular: Regular rate and rhythm with normal S1/S2 without murmurs, rubs or gallops. Abdomen: Soft, non-tender, non-distended with normal bowel sounds. Musculoskeletal: No clubbing, cyanosis or edema bilaterally. Full range of motion without deformity.   Skin: Skin color, texture, turgor diverticulosis, hemorrhoids. No active bleeding)     New RBBB and Left posterior fascicular block   -noted at [facility]  -cards on board and managing     CAD with stents   -no CP   -cardiology consulted   - on ASA and statin at home,      DM -appears controlled  -was holding oral regimen   -on lantus at dec'd dose  - Cont low SSI   -continue to monitor BG      HTN   -held monopril   -holding oral meds      HLD -on statin      Lymphoma, in remission         DVT Prophylaxis: scd given recent gib  Diet: ADULT DIET;  Clear Liquid  Code Status: Full Code  PT/OT Eval Status: not ordered    Dispo - icu care, pending further EP recs    Appropriate for A1 Discharge Unit: No      Prudence MD José Miguel

## 2022-09-17 NOTE — CONSULTS
Electrophysiology Consultation   Date: 9/16/2022  Admit Date:  9/16/2022  Reason for Consultation: Symptomatic pauses. Consult Requesting Physician: Chad Moreno MD     No chief complaint on file. HPI:   Mr. Aldon Schilder is a pleasant 67year old male  with a medical history significant for hypertension, hyperlipidemia, diabetes mellitus type II, history of lymphoma, coronary artery disease status post PCI, heart failure with preserved ejection fraction, and history of first degree heart block who presented from home with hematochezia and was found to have AVMs and whose clinical course has been complicated by sinus arrest/pauses. According to patient he has been suffering from intermittent episodes of dizziness and syncope along with fatigue. Unfortunately patient began to suffer from hematochezia. He presented from home and underwent colonoscopy that showed single non-bleeding cecal AVM and diverticulosis. Earlier this morning patient had a symptomatic pause up to 8 seconds. Cardiology was consulted. There was concern for trifascicular block. Cardiac monitor. Once patient was ready for discharge he had a syncopal event. His monitor showed symptomatic pause. Patient was transferred to [facility] for further evaluation of care.     Patient denies fevers, chest pain, orthopnea, PND, lower extremity edema, abdominal swelling, shortness of breath, dyspnea on exertion, chills, visual changes, headaches, sore throat, cough, abdominal pain, nausea, vomiting, bleeding, bruising, dysuria, muscle/joint pain, confusion, depression, anxiety, skin lesions, etc.    Past Medical History:   Diagnosis Date    CAD (coronary artery disease) 3/15/2013    Cancer ([facility] [address]     DDD (degenerative disc disease) 9/25/2012    Diabetes mellitus ([facility] [address]     Herniated disc     Hyperlipidemia 9/25/2012    Hypertension         Past Surgical History:   Procedure Laterality Date    CHOLECYSTECTOMY      COLONOSCOPY  8/13/2014 COLONOSCOPY N/A 9/15/2022    COLONOSCOPY CONTROL HEMORRHAGE performed by Aureliano Cardenas MD at [facility]      OTHER SURGICAL HISTORY Right 3/3/2016    PORT REMOVAL        Allergies   Allergen Reactions    Doxycycline Rash       Social History:  Reviewed. reports that he has quit smoking. He has quit using smokeless tobacco. He reports that he does not drink alcohol and does not use drugs. Family History:  Reviewed. family history includes Cancer in his father; Heart Disease in his mother; Stroke in his mother. No premature CAD. Review of System:  All other systems reviewed except for that noted above. Pertinent negatives and positives are:     General: negative for fever, chills   Ophthalmic ROS: negative for - eye pain or loss of vision  ENT ROS: negative for - headaches, sore throat   Respiratory: negative for - cough, sputum  Cardiovascular: Reviewed in HPI  Gastrointestinal: negative for - abdominal pain, diarrhea, N/V  Hematology: negative for - bleeding, blood clots, bruising or jaundice  Genito-Urinary:  negative for - Dysuria or incontinence  Musculoskeletal: negative for - Joint swelling, muscle pain  Neurological: negative for - confusion, dizziness, headaches   Psychiatric: No anxiety, no depression. Dermatological: negative for - rash    Physical Examination:  Vitals:    22 1900   BP: (!) 114/56   Pulse: 88   Resp: 22   Temp:    SpO2: 100%      No intake or output data in the 24 hours ending 228  No intake/output data recorded. Wt Readings from Last 3 Encounters:   22 175 lb 8 oz (79.6 kg)   22 193 lb (87.5 kg)   18 193 lb (87.5 kg)     Temp  Av.1 °F (36.7 °C)  Min: 97.9 °F (36.6 °C)  Max: 98.2 °F (36.8 °C)  Pulse  Av.1  Min: 80  Max: 104  BP  Min: 114/56  Max: 180/85  SpO2  Av.3 %  Min: 97 %  Max: 100 %    Telemetry: Sinus rhythm/tachycardia. Constitutional: Alert.  Oriented to person, place, and time. No distress. Head: Normocephalic and atraumatic. Mouth/Throat: Lips appear moist. Oropharynx is clear and moist.  Eyes: Conjunctivae normal. EOM are normal.   Neck: Neck supple. No lymphadenopathy. No rigidity. No JVD present. Cardiovascular: Normal rate, regular rhythm. Normal S1&S2. Pulmonary/Chest: Bilateral respiratory sounds present. No respiratory accessory muscle use. No wheezes, No rhonchi. Abdominal: Soft. Normal bowel sounds present. No distension, No tenderness. No splenomegaly. No hernia. Musculoskeletal: No tenderness. Neurological: Alert and oriented. Cranial nerve II-XII grossly intact, No gross deficit to touch. Skin: Skin is warm and dry. No rash, lesions, ulcerations noted. Psychiatric: No anxiety nor agitation. Labs:  Reviewed. Recent Labs     09/14/22 1513 09/16/22  0613   * 136   K 4.4 4.3   CL 96* 101   CO2 27 24   BUN 15 10   CREATININE 1.0 0.8     Recent Labs     09/14/22  1513 09/14/22  2348 09/15/22  1742 09/15/22  2338 09/16/22  0613   WBC 9.2  --   --   --   --    HGB 14.9   < > 13.3* 12.6* 12.6*   HCT 42.6   < > 38.6* 36.2* 36.3*   MCV 92.6  --   --   --   --      --   --   --   --     < > = values in this interval not displayed. Lab Results   Component Value Date/Time    TROPONINI <0.01 09/15/2022 11:54 AM     No results found for: BNP  Lab Results   Component Value Date/Time    PROTIME 14.1 09/15/2022 08:09 AM    PROTIME >120.0 09/15/2022 05:52 AM    PROTIME 14.5 09/14/2022 03:13 PM    INR 1.11 09/15/2022 08:09 AM    INR >16.29 09/15/2022 05:52 AM    INR 1.15 09/14/2022 03:13 PM     No results found for: CHOL, HDL, TRIG    Diagnostic and imaging results reviewed. ECG: Sinus rhythm with trifascicular block. Echo: 01/18/2016  Procedure: 2D Echo with Doppler and color flow (61942). Left Ventricle: Overall left ventricular ejection fraction is   estimated to be 55-60%. Poor endocardial border visualization.  The left ventricle is normal in size. There is mild concentric left   ventricular hypertrophy. The left ventricular wall motion is normal.   No left ventricular thrombus detected. Right Ventricle: The right ventricle is normal size. There is normal   right ventricular wall thickness. The right ventricular systolic   function is normal.     Left Atrium: The left atrial size is normal.     Right Atrium: Right atrial size is normal.     Mitral Valve: The mitral valve leaflets are mildly thickened in   appearance. There is mild mitral regurgitation. There is no evidence   of mitral valve prolapse. There is no mitral valve stenosis. Aortic Valve: The aortic valve is normal in structure. No aortic   regurgitation is present. No hemodynamically significant valvular   aortic stenosis. Aortic Root: The aortic root is normal size. Tricuspid Valve: The tricuspid valve is normal in structure and   function. Trace tricuspid regurgitation is present. Diastolic Function: The diastolic function is impaired and classified   as Grade 1 (impaired relaxation). Pulmonic Valve/Pulmonary Artery: The pulmonic valve is not well   visualized. There is no pulmonic valvular regurgitation. Pericardium: There is no pericardial effusion. There is no pleural   effusion. I independently reviewed the ECG and telemetry.     Scheduled Meds:   [START ON 9/17/2022] ascorbic acid  500 mg Oral Daily    [START ON 9/17/2022] aspirin  81 mg Oral Daily    [START ON 9/17/2022] azithromycin  250 mg Oral Daily    insulin glargine  10 Units SubCUTAneous Nightly    [START ON 9/17/2022] insulin lispro  0-4 Units SubCUTAneous TID WC    insulin lispro  0-4 Units SubCUTAneous Nightly    [START ON 9/17/2022] pantoprazole  40 mg Oral QAM AC    [START ON 9/17/2022] psyllium  1 packet Oral Daily with breakfast    sodium chloride flush  5-40 mL IntraVENous 2 times per day    [START ON 9/17/2022] tamsulosin  0.4 mg Oral Daily     Continuous Infusions:   isoproterenol (ISUPREL) infusion      sodium chloride      dextrose       PRN Meds:.sodium chloride, acetaminophen **OR** acetaminophen, dextrose, dextrose bolus **OR** dextrose bolus, glucagon (rDNA), glucose, ondansetron **OR** ondansetron, sodium chloride flush, polyethylene glycol     Assessment:   Patient Active Problem List    Diagnosis Date Noted    Diverticulosis 09/16/2022    Primary hypertension 09/16/2022    Complete heart block ([facility] [address] 09/16/2022    GI bleed 09/14/2022    History of non-Hodgkin's lymphoma 02/22/2016    CAD (coronary artery disease) 03/15/2013    Hyperlipidemia 09/25/2012    Diabetes mellitus ([facility] [address] 09/25/2012    Lymphoma ([facility] [address] 09/25/2012    DDD (degenerative disc disease) 09/25/2012    Chronic bronchitis ([facility] [address] 09/25/2012    Psoriasis 09/25/2012      Active Hospital Problems    Diagnosis Date Noted    Complete heart block [facility]) [I44.2] 09/16/2022     Priority: Medium     Mr. Demetra Aviles is a pleasant 67year old male  with a medical history significant for hypertension, hyperlipidemia, diabetes mellitus type II, history of lymphoma, coronary artery disease status post PCI, heart failure with preserved ejection fraction, and history of first degree heart block who presented from home with hematochezia and was found to have AVMs and whose clinical course has been complicated by sinus arrest/pauses. Problem List:  1. Sick sinus syndrome. 2. Coronary artery disease status post PCI. 3. Colonic AVM. Assessment and Plan:  1. Sick sinus syndrome. Patient is a pleasant 67year old female with a medical history significant for ischemic cardiomyopathy status post PCI, hypertension, diabetes mellitus type II, heart failure with preserved ejection fraction, and first degree heart block who presented with hematochezia secondary to AVM and diverticulitis and was found to have symptomatic pauses with sinus arrest.  Patient very responsive to isuprel.   No pauses since transfer however will plan to keep isuprel and limit physical activity. Plan for echocardiogram.  Troponin enzymes negative and EKG not consistent with ACS. We will plan on getting echocardiogram on Monday and moving forward with pacemaker. If LVEF is low then will ask for ischemic evaluation.  - Echocardiogram tomorrow. If normal then DC pacemaker. If abnormal then ischemic evaluation.  - Avoid suhas agents, not on at home. - PRN isuprel. - We will continue to follow along with you. 2. Coronary artery disease status post PCI. Stable. - Plan as per above. 3. Colonic AVM. - Per GI and primary team.    I spent greater than 60 minutes in chart review, patient discussion, education and care. Thank you for allowing me to participate in the care of Neela Malone . If you have any questions/comments, please do not hesitate to contact us.     Arielle Albert MD  Cardiac Electrophysiology  [address] [facility]  [phone] [facility]

## 2022-09-17 NOTE — PROGRESS NOTES
[address]   Electrophysiology Progress Note     Admit Date: 9/16/2022     Reason for follow up: Bradycardia    HPI and Interval History: 67 y.o. male hypertension, hyperlipidemia, diabetes mellitus type II, history of lymphoma, coronary artery disease status post PCI, heart failure with preserved ejection fraction, and history of first degree heart block who presented from home with hematochezia and was found to have cecal AVMs and whose clinical course has been complicated by sinus arrest/pauses. Reportedly he has had a symptomatic pause around 8 seconds. Transferred to [facility]. Wife at bedside. States that he has had syncope at home too. He had a witnessed syncope at [facility]     Patient seen and examined. Clinical notes reviewed. Telemetry reviewed. Complain of abdominal distention. No major events overnight. Denies having chest pain, shortness of breath, dyspnea on exertion, Orthopnea, PND at the time of this visit. Assessment:   Sick sinus syndrome  Symptomatic bradycardia  CAD history of PCI  Colonic AVM / GI bleeding     Plan:   Symptomatic bradycardia with syncope    Has conduction system disease with bifascicular block and prolonged CO interval.   Needs pacemaker implantation. Seen by EP and possible PPM on Monday. Keep NPO after midnight on Sunday. Echo pending. Keep K around 4 and Mg around 2   No AV suhas blocking agents    HR now 90s. Off Isuprel. Discussed with nursing staff. Active Hospital Problems    Diagnosis Date Noted    Complete heart block ([address] [I44.2] 09/16/2022     Priority: Medium       Diagnostic studies:     ECG: Sinus rhythm with bifascicular block RBBB, LPFB and prolonged CO interval.      Echo: 01/18/2016  Procedure: 2D Echo with Doppler and color flow (32070). Left Ventricle: Overall left ventricular ejection fraction is   estimated to be 55-60%. Poor endocardial border visualization. The   left ventricle is normal in size. There is mild concentric left   ventricular hypertrophy. The left ventricular wall motion is normal.   No left ventricular thrombus detected. Right Ventricle: The right ventricle is normal size. There is normal   right ventricular wall thickness. The right ventricular systolic   function is normal.     Left Atrium: The left atrial size is normal.     Right Atrium: Right atrial size is normal.     Mitral Valve: The mitral valve leaflets are mildly thickened in   appearance. There is mild mitral regurgitation. There is no evidence   of mitral valve prolapse. There is no mitral valve stenosis. Aortic Valve: The aortic valve is normal in structure. No aortic   regurgitation is present. No hemodynamically significant valvular   aortic stenosis. Aortic Root: The aortic root is normal size. Tricuspid Valve: The tricuspid valve is normal in structure and   function. Trace tricuspid regurgitation is present. Diastolic Function: The diastolic function is impaired and classified   as Grade 1 (impaired relaxation). Pulmonic Valve/Pulmonary Artery: The pulmonic valve is not well   visualized. There is no pulmonic valvular regurgitation. Pericardium: There is no pericardial effusion. There is no pleural   effusion. I independently reviewed the cardiac diagnostic studies, ECG and relevant imaging studies.      Physical Examination:  Vitals:    22 1200   BP: 132/66   Pulse: 82   Resp: (!) 8   Temp: 98.3 °F (36.8 °C)   SpO2: 96%      In: -   Out: 1625    Wt Readings from Last 3 Encounters:   22 175 lb 8 oz (79.6 kg)   22 193 lb (87.5 kg)   18 193 lb (87.5 kg)     Temp  Av.1 °F (36.7 °C)  Min: 97.9 °F (36.6 °C)  Max: 98.3 °F (36.8 °C)  Pulse  Av.4  Min: 70  Max: 104  BP  Min: 114/56  Max: 180/85  SpO2  Av.2 %  Min: 96 %  Max: 100 %    Intake/Output Summary (Last 24 hours) at 2022 1317  Last data filed at 2022 0928  Gross per 24 hour   Intake --   Output 1625 ml   Net -1625 ml       I independently reviewed all cardiac tracing from cardiac telemetry. Constitutional: Oriented. No distress. Head: Normocephalic and atraumatic. Mouth/Throat: Oropharynx is clear and moist.   Eyes: Conjunctivae normal. EOM are normal.   Neck: Neck supple. No JVD present. Cardiovascular: Normal rate, regular rhythm, S1&S2. Pulmonary/Chest: Bilateral respiratory sounds. No rhonchi. Abdominal: Soft. No tenderness. Musculoskeletal: No tenderness. No edema    Lymphadenopathy: Has no cervical adenopathy. Neurological: Alert and oriented. Follows command, No Gross deficit   Skin: Skin is warm, No rash noted. Psychiatric: Has a normal behavior     Scheduled Meds:   guaiFENesin  600 mg Oral BID    ascorbic acid  500 mg Oral Daily    aspirin  81 mg Oral Daily    insulin glargine  10 Units SubCUTAneous Nightly    insulin lispro  0-4 Units SubCUTAneous TID WC    insulin lispro  0-4 Units SubCUTAneous Nightly    pantoprazole  40 mg Oral QAM AC    psyllium  1 packet Oral Daily with breakfast    sodium chloride flush  5-40 mL IntraVENous 2 times per day    tamsulosin  0.4 mg Oral Daily     Continuous Infusions:   isoproterenol (ISUPREL) infusion      sodium chloride      dextrose       PRN Meds:sodium chloride, acetaminophen **OR** acetaminophen, dextrose, dextrose bolus **OR** dextrose bolus, glucagon (rDNA), glucose, ondansetron **OR** ondansetron, sodium chloride flush, polyethylene glycol, perflutren lipid microspheres     Prior to Admission medications    Medication Sig Start Date End Date Taking?  Authorizing Provider   insulin glargine (LANTUS) 100 UNIT/ML injection vial Inject 35 Units into the skin nightly 9/16/22   Danielle Pike MD   metFORMIN (GLUCOPHAGE) 1000 MG tablet Take 1 tablet by mouth 2 times daily (with meals) 9/17/22   Danielle Pike MD   psyllium (METAMUCIL) 58.12 % PACK packet Take 1 packet by mouth daily (with breakfast) 9/17/22   Danielle Pike MD azithromycin (ZITHROMAX) 250 MG tablet Take 1 tablet by mouth daily Complete the Rx at home 9/17/22   Shashank Mcghee MD   tamsulosin [facility]) 0.4 MG capsule Take 1 capsule by mouth daily 9/17/22   Shashank Mcghee MD   pantoprazole (PROTONIX) 40 MG tablet Take 1 tablet by mouth every morning (before breakfast) 9/16/22   Shashank Mcghee MD   alogliptin (NESINA) 25 MG TABS tablet Take 25 mg by mouth daily    Historical Provider, MD   diclofenac sodium (VOLTAREN) 1 % GEL Apply topically 4 times daily as needed for Pain    Historical Provider, MD   empagliflozin (JARDIANCE) 25 MG tablet Take 25 mg by mouth daily    Historical Provider, MD   Multiple Vitamins-Minerals (THERAPEUTIC MULTIVITAMIN-MINERALS) tablet Take 1 tablet by mouth daily    Historical Provider, MD   ascorbic acid (VITAMIN C) 500 MG tablet Take 500 mg by mouth daily    Historical Provider, MD   Coenzyme Q10 (CO Q 10) 10 MG CAPS Take 1 capsule by mouth daily    Historical Provider, MD   magnesium oxide (MAG-OX) 400 MG tablet Take 420 mg by mouth daily    Historical Provider, MD   aspirin 81 MG tablet Take 81 mg by mouth daily. Historical Provider, MD   fosinopril (MONOPRIL) 40 MG tablet Take 40 mg by mouth daily. Historical Provider, MD   nitroGLYCERIN (NITROSTAT) 0.4 MG SL tablet Place 0.4 mg under the tongue every 5 minutes as needed. Historical Provider, MD   rosuvastatin (CRESTOR) 20 MG tablet Take 20 mg by mouth daily    Historical Provider, MD   fish oil-omega-3 fatty acids 1000 MG capsule Take 1 g by mouth daily 2capsule in the morning. One at night    Historical Provider, MD       Review of System:  [x] Full ROS obtained and negative except as mentioned in HPI    Relevant and available labs, and cardiovascular diagnostics reviewed. Reviewed.    Recent Labs     09/14/22  1513 09/16/22  0613   * 136   K 4.4 4.3   CL 96* 101   CO2 27 24   BUN 15 10   CREATININE 1.0 0.8     Recent Labs     09/14/22  1513 09/14/22  8507 09/15/22  1742 09/15/22  2338 09/16/22  0613   WBC 9.2  --   --   --   --    HGB 14.9   < > 13.3* 12.6* 12.6*   HCT 42.6   < > 38.6* 36.2* 36.3*   MCV 92.6  --   --   --   --      --   --   --   --     < > = values in this interval not displayed. Estimated Creatinine Clearance: 80 mL/min (based on SCr of 0.8 mg/dL). No results found for: BNP    I independently reviewed all cardiac tracing from cardiac telemetry. I independently reviewed relevant and available cardiac diagnostic tests ECG, CXR, Echo, Stress test, Device interrogation, Holter, CT scan. Outside medical records via Care everywhere reviewed and summarized in H&P above. Complex medical condition with multiple medical problems affecting prognosis and outcome of EP interventions    Thank you for allowing me to participate in the care of Mateo Sebastian     All questions and concerns were addressed to the patient/family. Alternatives to my treatment were discussed. I have discussed the above stated plan and the patient verbalized understanding and agreed with the plan. NOTE: This report was transcribed using voice recognition software. Every effort was made to ensure accuracy, however, inadvertent computerized transcription errors may be present.      Manny Clements MD, MPH  [place] [address]   Office: [phone]  Fax: [phone]

## 2022-09-18 PROBLEM — R55 SYNCOPE: Status: ACTIVE | Noted: 2022-09-18

## 2022-09-18 LAB
ANION GAP SERPL CALCULATED.3IONS-SCNC: 10 MMOL/L (ref 3–16)
BUN BLDV-MCNC: 7 MG/DL (ref 7–20)
CALCIUM SERPL-MCNC: 8.5 MG/DL (ref 8.3–10.6)
CHLORIDE BLD-SCNC: 95 MMOL/L (ref 99–110)
CO2: 26 MMOL/L (ref 21–32)
CREAT SERPL-MCNC: 0.7 MG/DL (ref 0.8–1.3)
GFR AFRICAN AMERICAN: >60
GFR NON-AFRICAN AMERICAN: >60
GLUCOSE BLD-MCNC: 170 MG/DL (ref 70–99)
GLUCOSE BLD-MCNC: 190 MG/DL (ref 70–99)
GLUCOSE BLD-MCNC: 245 MG/DL (ref 70–99)
GLUCOSE BLD-MCNC: 252 MG/DL (ref 70–99)
HCT VFR BLD CALC: 37.2 % (ref 40.5–52.5)
HEMOGLOBIN: 12.7 G/DL (ref 13.5–17.5)
MCH RBC QN AUTO: 31.7 PG (ref 26–34)
MCHC RBC AUTO-ENTMCNC: 34.1 G/DL (ref 31–36)
MCV RBC AUTO: 93 FL (ref 80–100)
PDW BLD-RTO: 12.7 % (ref 12.4–15.4)
PERFORMED ON: ABNORMAL
PLATELET # BLD: 241 K/UL (ref 135–450)
PMV BLD AUTO: 7 FL (ref 5–10.5)
POTASSIUM REFLEX MAGNESIUM: 4 MMOL/L (ref 3.5–5.1)
RBC # BLD: 4 M/UL (ref 4.2–5.9)
SODIUM BLD-SCNC: 131 MMOL/L (ref 136–145)
WBC # BLD: 7.1 K/UL (ref 4–11)

## 2022-09-18 PROCEDURE — 2000000000 HC ICU R&B

## 2022-09-18 PROCEDURE — 80048 BASIC METABOLIC PNL TOTAL CA: CPT

## 2022-09-18 PROCEDURE — 2700000000 HC OXYGEN THERAPY PER DAY

## 2022-09-18 PROCEDURE — 85027 COMPLETE CBC AUTOMATED: CPT

## 2022-09-18 PROCEDURE — 2580000003 HC RX 258: Performed by: INTERNAL MEDICINE

## 2022-09-18 PROCEDURE — 36415 COLL VENOUS BLD VENIPUNCTURE: CPT

## 2022-09-18 PROCEDURE — 6370000000 HC RX 637 (ALT 250 FOR IP): Performed by: INTERNAL MEDICINE

## 2022-09-18 PROCEDURE — 99232 SBSQ HOSP IP/OBS MODERATE 35: CPT | Performed by: INTERNAL MEDICINE

## 2022-09-18 PROCEDURE — 94761 N-INVAS EAR/PLS OXIMETRY MLT: CPT

## 2022-09-18 RX ADMIN — PANTOPRAZOLE SODIUM 40 MG: 40 TABLET, DELAYED RELEASE ORAL at 09:22

## 2022-09-18 RX ADMIN — PSYLLIUM HUSK 1 PACKET: 3.4 POWDER ORAL at 09:21

## 2022-09-18 RX ADMIN — SODIUM CHLORIDE, PRESERVATIVE FREE 10 ML: 5 INJECTION INTRAVENOUS at 20:17

## 2022-09-18 RX ADMIN — OXYCODONE HYDROCHLORIDE AND ACETAMINOPHEN 500 MG: 500 TABLET ORAL at 09:22

## 2022-09-18 RX ADMIN — INSULIN LISPRO 2 UNITS: 100 INJECTION, SOLUTION INTRAVENOUS; SUBCUTANEOUS at 11:57

## 2022-09-18 RX ADMIN — SODIUM CHLORIDE, PRESERVATIVE FREE 10 ML: 5 INJECTION INTRAVENOUS at 09:22

## 2022-09-18 RX ADMIN — ASPIRIN 81 MG 81 MG: 81 TABLET ORAL at 09:22

## 2022-09-18 RX ADMIN — GUAIFENESIN 600 MG: 600 TABLET, EXTENDED RELEASE ORAL at 20:17

## 2022-09-18 RX ADMIN — INSULIN GLARGINE 10 UNITS: 100 INJECTION, SOLUTION SUBCUTANEOUS at 20:17

## 2022-09-18 RX ADMIN — GUAIFENESIN 600 MG: 600 TABLET, EXTENDED RELEASE ORAL at 09:22

## 2022-09-18 RX ADMIN — TAMSULOSIN HYDROCHLORIDE 0.4 MG: 0.4 CAPSULE ORAL at 09:22

## 2022-09-18 ASSESSMENT — PAIN SCALES - GENERAL
PAINLEVEL_OUTOF10: 0

## 2022-09-18 NOTE — PROGRESS NOTES
Patient VSS. No significant events occurred today. Plan for PPM placement tomorrow. Safety precautions in place.  Will continue to monitor

## 2022-09-18 NOTE — PROGRESS NOTES
Hospitalist Progress Note      PCP: Andi Henry MD    Date of Admission: 9/16/2022    Chief Complaint: complete heart block    Hospital Course: reviewed     Subjective:  no complaints, remains of isuprel ggt       Medications:  Reviewed    Infusion Medications    isoproterenol (ISUPREL) infusion      sodium chloride      dextrose       Scheduled Medications    guaiFENesin  600 mg Oral BID    ascorbic acid  500 mg Oral Daily    aspirin  81 mg Oral Daily    insulin glargine  10 Units SubCUTAneous Nightly    insulin lispro  0-4 Units SubCUTAneous TID WC    insulin lispro  0-4 Units SubCUTAneous Nightly    pantoprazole  40 mg Oral QAM AC    psyllium  1 packet Oral Daily with breakfast    sodium chloride flush  5-40 mL IntraVENous 2 times per day    tamsulosin  0.4 mg Oral Daily     PRN Meds: sodium chloride, acetaminophen **OR** acetaminophen, dextrose, dextrose bolus **OR** dextrose bolus, glucagon (rDNA), glucose, ondansetron **OR** ondansetron, sodium chloride flush, polyethylene glycol, perflutren lipid microspheres      Intake/Output Summary (Last 24 hours) at 9/18/2022 0839  Last data filed at 9/18/2022 0529  Gross per 24 hour   Intake 1616 ml   Output 1450 ml   Net 166 ml       Physical Exam Performed:    /85   Pulse 77   Temp 98 °F (36.7 °C) (Oral)   Resp 24   SpO2 91%     General appearance: No apparent distress, appears stated age and cooperative. HEENT: Pupils equal, round, and reactive to light. Conjunctivae/corneas clear. Neck: Supple, with full range of motion. No jugular venous distention. Trachea midline. Respiratory:  Normal respiratory effort. Clear to auscultation, bilaterally without Rales/Wheezes/Rhonchi. Cardiovascular: Regular rate and rhythm with normal S1/S2 without murmurs, rubs or gallops. Abdomen: Soft, non-tender, non-distended with normal bowel sounds. Musculoskeletal: No clubbing, cyanosis or edema bilaterally. Full range of motion without deformity.   Skin: Skin color, texture, turgor normal.  No rashes or lesions. Neurologic:  Neurovascularly intact without any focal sensory/motor deficits. Cranial nerves: II-XII intact, grossly non-focal.  Psychiatric: Alert and oriented, thought content appropriate, normal insight  Capillary Refill: Brisk, 3 seconds, normal   Peripheral Pulses: +2 palpable, equal bilaterally       Labs:   Recent Labs     09/15/22  1742 09/15/22  2338 09/16/22  0613   HGB 13.3* 12.6* 12.6*   HCT 38.6* 36.2* 36.3*     Recent Labs     09/16/22  0613      K 4.3      CO2 24   BUN 10   CREATININE 0.8   CALCIUM 8.5     No results for input(s): AST, ALT, BILIDIR, BILITOT, ALKPHOS in the last 72 hours. No results for input(s): INR in the last 72 hours. Recent Labs     09/15/22  0944 09/15/22  1154   TROPONINI <0.01 <0.01       Urinalysis:      Lab Results   Component Value Date/Time    NITRU Negative 09/14/2022 05:40 PM    BLOODU Negative 09/14/2022 05:40 PM    SPECGRAV <=1.005 09/14/2022 05:40 PM    GLUCOSEU >=1000 09/14/2022 05:40 PM       Radiology:  No orders to display           Assessment/Plan:    Active Hospital Problems    Diagnosis     Complete heart block ([address] [I44.2]      Priority: Medium     Syncope- with concern for complete ht block/sinus pauses, possible sick sinus syndrome  -on isuprel ggt  -EP consulted, apprec recs  -placed in ICU   -echo done(ef 40-45%, hk of mid to basal inferior/inferoseptal/inferolateral walls, g1dd, mild mr)  -Per EP note(, if echo normal then DC pacemaker. If abnormal then ischemic evaluation. Avoid suhas agents, not on at home. - PRN isuprel.)     Abdominal pain - likely from GI bleed -> BRBPR  -monitor h/h  -CTA abdomen with no active bleeding, did show evidence of diverticulosis   -IV PPI was required  -GI consulted,  colonoscopy done 9/15( single non-bleeding Cecal AVM s/p APC, diverticulosis, hemorrhoids.  No active bleeding)     New RBBB and Left posterior fascicular block   -noted at [facility]  -cards on board and managing     Cardiomyopathy- ?new diagnosis, noted on echo 9/17  -pending cards recs  -defer when to start bb/acei per cards    CAD with stents   -no CP   -cardiology consulted   - on ASA and statin at home,      DM -appears controlled  -was holding oral regimen   -on lantus at dec'd dose  - Cont low SSI   -continue to monitor BG      HTN   -held monopril   -holding oral meds      HLD -on statin      Lymphoma, in remission         DVT Prophylaxis: scd given recent gib  Diet: ADULT DIET; Clear Liquid  Code Status: Full Code  PT/OT Eval Status: not ordered    Dispo - icu care, per EP, pending possible pacer 9/19, Ischemic eval given new cardiomyopathy?     Appropriate for A1 Discharge Unit: No      Sybil Taylor MD

## 2022-09-18 NOTE — PROGRESS NOTES
[address]   Electrophysiology Progress Note     Admit Date: 9/16/2022     Reason for follow up: Bradycardia    HPI and Interval History: 67 y.o. male hypertension, hyperlipidemia, diabetes mellitus type II, history of lymphoma, coronary artery disease status post PCI, heart failure with preserved ejection fraction, and history of first degree heart block who presented from home with hematochezia and was found to have cecal AVMs and whose clinical course has been complicated by sinus arrest/pauses. Reportedly he has had a symptomatic pause around 8 seconds. Transferred to [employer]. Patient seen and examined. Clinical notes reviewed. Telemetry reviewed. No major events overnight. Feeling Ok. No complaint. Assessment:   Sick sinus syndrome  Symptomatic bradycardia  CAD history of PCI  Colonic AVM / GI bleeding     Plan:   Symptomatic bradycardia with syncope    Bifascicular block and prolonged [place] interval consistent with conduction system disease. We have discussed pacemaker implantation with him and his wife. Keep NPO after midnight on Sunday. Echo reviewed. Mild LV dysfunction, EF: 45%. Keep K around 4 and Mg around 2   No AV suhas blocking agents    HR stable off isuprel. Discussed with nursing staff. Active Hospital Problems    Diagnosis Date Noted    Complete heart block ([address] [I44.2] 09/16/2022     Priority: Medium       Diagnostic studies:     ECG: Sinus rhythm with bifascicular block RBBB, LPFB and prolonged W[place] interval.      Echo: 9/2022:    Overall, left ventricular systolic function is mildly depressed with an   estimated ejection fraction of 40-45%. There is hypokinesis of the mid to basal inferior, inferoseptal, and   inferolateral walls. Normal left ventricle size and wall thickness. Grade I diastolic dysfunction with normal filling pressure. Mild mitral regurgitation. Aortic valve appears sclerotic but opens adequately.    Unable to estimate pulmonary artery pressure secondary to incomplete TR jet   envelope. The right ventricle is normal in size and function. Echo: 01/18/2016  Procedure: 2D Echo with Doppler and color flow (27380). Left Ventricle: Overall left ventricular ejection fraction is   estimated to be 55-60%. Poor endocardial border visualization. The   left ventricle is normal in size. There is mild concentric left   ventricular hypertrophy. The left ventricular wall motion is normal.   No left ventricular thrombus detected. Right Ventricle: The right ventricle is normal size. There is normal   right ventricular wall thickness. The right ventricular systolic   function is normal.     Left Atrium: The left atrial size is normal.     Right Atrium: Right atrial size is normal.     Mitral Valve: The mitral valve leaflets are mildly thickened in   appearance. There is mild mitral regurgitation. There is no evidence   of mitral valve prolapse. There is no mitral valve stenosis. Aortic Valve: The aortic valve is normal in structure. No aortic   regurgitation is present. No hemodynamically significant valvular   aortic stenosis. Aortic Root: The aortic root is normal size. Tricuspid Valve: The tricuspid valve is normal in structure and   function. Trace tricuspid regurgitation is present. Diastolic Function: The diastolic function is impaired and classified   as Grade 1 (impaired relaxation). Pulmonic Valve/Pulmonary Artery: The pulmonic valve is not well   visualized. There is no pulmonic valvular regurgitation. Pericardium: There is no pericardial effusion. There is no pleural   effusion. I independently reviewed the cardiac diagnostic studies, ECG and relevant imaging studies.      Physical Examination:  Vitals:    09/18/22 0800   BP: 130/85   Pulse: 77   Resp: 24   Temp:    SpO2: 91%      In: 1616 [P.O.:1616]  Out: 1700    Wt Readings from Last 3 Encounters:   09/16/22 175 lb 8 oz (79.6 kg)   07/25/22 193 lb (87.5 kg)   18 193 lb (87.5 kg)     Temp  Av °F (36.7 °C)  Min: 97.4 °F (36.3 °C)  Max: 98.3 °F (36.8 °C)  Pulse  Av.5  Min: 72  Max: 97  BP  Min: 108/92  Max: 175/68  SpO2  Av.6 %  Min: 91 %  Max: 99 %    Intake/Output Summary (Last 24 hours) at 2022 0902  Last data filed at 2022 0529  Gross per 24 hour   Intake 1616 ml   Output 1450 ml   Net 166 ml         I independently reviewed all cardiac tracing from cardiac telemetry. Constitutional: Oriented. No distress. Head: Normocephalic and atraumatic. Mouth/Throat: Oropharynx is clear and moist.    Eyes: Conjunctivae normal. EOM are normal.    Neck: Neck supple. No JVD present. Cardiovascular: Normal rate, regular rhythm, S1&S2. Pulmonary/Chest: Bilateral respiratory sounds. No rhonchi. Abdominal: Soft. No tenderness. Musculoskeletal: No tenderness. No edema     Lymphadenopathy: Has no cervical adenopathy. Neurological: Alert and oriented. Follows command, No Gross deficit    Skin: Skin is warm, No rash noted. Psychiatric: Has a normal behavior         Scheduled Meds:   guaiFENesin  600 mg Oral BID    ascorbic acid  500 mg Oral Daily    aspirin  81 mg Oral Daily    insulin glargine  10 Units SubCUTAneous Nightly    insulin lispro  0-4 Units SubCUTAneous TID WC    insulin lispro  0-4 Units SubCUTAneous Nightly    pantoprazole  40 mg Oral QAM AC    psyllium  1 packet Oral Daily with breakfast    sodium chloride flush  5-40 mL IntraVENous 2 times per day    tamsulosin  0.4 mg Oral Daily     Continuous Infusions:   isoproterenol (ISUPREL) infusion      sodium chloride      dextrose       PRN Meds:sodium chloride, acetaminophen **OR** acetaminophen, dextrose, dextrose bolus **OR** dextrose bolus, glucagon (rDNA), glucose, ondansetron **OR** ondansetron, sodium chloride flush, polyethylene glycol, perflutren lipid microspheres     Prior to Admission medications    Medication Sig Start Date End Date Taking? Authorizing Provider   insulin glargine (LANTUS) 100 UNIT/ML injection vial Inject 35 Units into the skin nightly 9/16/22   Shashank Mcghee MD   metFORMIN (GLUCOPHAGE) 1000 MG tablet Take 1 tablet by mouth 2 times daily (with meals) 9/17/22   Shashank Mcghee MD   psyllium (METAMUCIL) 58.12 % PACK packet Take 1 packet by mouth daily (with breakfast) 9/17/22   Shashank Mcghee MD   azithromycin (ZITHROMAX) 250 MG tablet Take 1 tablet by mouth daily Complete the Rx at home 9/17/22   Shashank Mcghee MD   tamsulosin [facility]) 0.4 MG capsule Take 1 capsule by mouth daily 9/17/22   Shashank Mcghee MD   pantoprazole (PROTONIX) 40 MG tablet Take 1 tablet by mouth every morning (before breakfast) 9/16/22   Shashank Mcghee MD   alogliptin (NESINA) 25 MG TABS tablet Take 25 mg by mouth daily    Historical Provider, MD   diclofenac sodium (VOLTAREN) 1 % GEL Apply topically 4 times daily as needed for Pain    Historical Provider, MD   empagliflozin (JARDIANCE) 25 MG tablet Take 25 mg by mouth daily    Historical Provider, MD   Multiple Vitamins-Minerals (THERAPEUTIC MULTIVITAMIN-MINERALS) tablet Take 1 tablet by mouth daily    Historical Provider, MD   ascorbic acid (VITAMIN C) 500 MG tablet Take 500 mg by mouth daily    Historical Provider, MD   Coenzyme Q10 (CO Q 10) 10 MG CAPS Take 1 capsule by mouth daily    Historical Provider, MD   magnesium oxide (MAG-OX) 400 MG tablet Take 420 mg by mouth daily    Historical Provider, MD   aspirin 81 MG tablet Take 81 mg by mouth daily. Historical Provider, MD   fosinopril (MONOPRIL) 40 MG tablet Take 40 mg by mouth daily. Historical Provider, MD   nitroGLYCERIN (NITROSTAT) 0.4 MG SL tablet Place 0.4 mg under the tongue every 5 minutes as needed. Historical Provider, MD   rosuvastatin (CRESTOR) 20 MG tablet Take 20 mg by mouth daily    Historical Provider, MD   fish oil-omega-3 fatty acids 1000 MG capsule Take 1 g by mouth daily 2capsule in the morning.   One at night    Historical Provider, MD       Review of System:  [x] Full ROS obtained and negative except as mentioned in HPI    Relevant and available labs, and cardiovascular diagnostics reviewed. Reviewed. Recent Labs     09/16/22  0613      K 4.3      CO2 24   BUN 10   CREATININE 0.8       Recent Labs     09/15/22  2338 09/16/22  0613 09/18/22  0829   WBC  --   --  7.1   HGB 12.6* 12.6* 12.7*   HCT 36.2* 36.3* 37.2*   MCV  --   --  93.0   PLT  --   --  241       Estimated Creatinine Clearance: 80 mL/min (based on SCr of 0.8 mg/dL). No results found for: BNP    I independently reviewed all cardiac tracing from cardiac telemetry. I independently reviewed relevant and available cardiac diagnostic tests ECG, CXR, Echo, Stress test, Device interrogation, Holter, CT scan. Outside medical records via Care everywhere reviewed and summarized in H&P above. Complex medical condition with multiple medical problems affecting prognosis and outcome of EP interventions    Thank you for allowing me to participate in the care of Jeison Gabriel     All questions and concerns were addressed to the patient/family. Alternatives to my treatment were discussed. I have discussed the above stated plan and the patient verbalized understanding and agreed with the plan. NOTE: This report was transcribed using voice recognition software. Every effort was made to ensure accuracy, however, inadvertent computerized transcription errors may be present.      Ghanshyam Diallo MD, MPH  [facility]   Office: [phone]  Fax: [phone]

## 2022-09-19 ENCOUNTER — NURSE ONLY (OUTPATIENT)
Dept: CARDIOLOGY CLINIC | Age: 77
End: 2022-09-19

## 2022-09-19 ENCOUNTER — ANESTHESIA EVENT (OUTPATIENT)
Dept: CARDIAC CATH/INVASIVE PROCEDURES | Age: 77
DRG: 242 | End: 2022-09-19
Payer: MEDICARE

## 2022-09-19 ENCOUNTER — ANESTHESIA (OUTPATIENT)
Dept: CARDIAC CATH/INVASIVE PROCEDURES | Age: 77
DRG: 242 | End: 2022-09-19
Payer: MEDICARE

## 2022-09-19 ENCOUNTER — APPOINTMENT (OUTPATIENT)
Dept: CARDIAC CATH/INVASIVE PROCEDURES | Age: 77
DRG: 242 | End: 2022-09-19
Attending: HOSPITALIST
Payer: MEDICARE

## 2022-09-19 DIAGNOSIS — I49.5 SICK SINUS SYNDROME (HCC): ICD-10-CM

## 2022-09-19 DIAGNOSIS — Z95.0 PACEMAKER: ICD-10-CM

## 2022-09-19 DIAGNOSIS — I44.30 AVB (ATRIOVENTRICULAR BLOCK): Primary | ICD-10-CM

## 2022-09-19 LAB
ANION GAP SERPL CALCULATED.3IONS-SCNC: 9 MMOL/L (ref 3–16)
BUN BLDV-MCNC: 5 MG/DL (ref 7–20)
CALCIUM SERPL-MCNC: 8.4 MG/DL (ref 8.3–10.6)
CHLORIDE BLD-SCNC: 93 MMOL/L (ref 99–110)
CO2: 24 MMOL/L (ref 21–32)
CREAT SERPL-MCNC: 0.6 MG/DL (ref 0.8–1.3)
GFR AFRICAN AMERICAN: >60
GFR NON-AFRICAN AMERICAN: >60
GLUCOSE BLD-MCNC: 168 MG/DL (ref 70–99)
GLUCOSE BLD-MCNC: 175 MG/DL (ref 70–99)
GLUCOSE BLD-MCNC: 180 MG/DL (ref 70–99)
GLUCOSE BLD-MCNC: 213 MG/DL (ref 70–99)
HCT VFR BLD CALC: 36.4 % (ref 40.5–52.5)
HEMOGLOBIN: 12.9 G/DL (ref 13.5–17.5)
MCH RBC QN AUTO: 32.3 PG (ref 26–34)
MCHC RBC AUTO-ENTMCNC: 35.4 G/DL (ref 31–36)
MCV RBC AUTO: 91.3 FL (ref 80–100)
PDW BLD-RTO: 12.7 % (ref 12.4–15.4)
PERFORMED ON: ABNORMAL
PLATELET # BLD: 240 K/UL (ref 135–450)
PMV BLD AUTO: 6.8 FL (ref 5–10.5)
POTASSIUM REFLEX MAGNESIUM: 3.9 MMOL/L (ref 3.5–5.1)
RBC # BLD: 3.99 M/UL (ref 4.2–5.9)
SODIUM BLD-SCNC: 126 MMOL/L (ref 136–145)
WBC # BLD: 6.5 K/UL (ref 4–11)

## 2022-09-19 PROCEDURE — 6360000002 HC RX W HCPCS

## 2022-09-19 PROCEDURE — 80048 BASIC METABOLIC PNL TOTAL CA: CPT

## 2022-09-19 PROCEDURE — C1889 IMPLANT/INSERT DEVICE, NOC: HCPCS

## 2022-09-19 PROCEDURE — 2500000003 HC RX 250 WO HCPCS

## 2022-09-19 PROCEDURE — 6370000000 HC RX 637 (ALT 250 FOR IP): Performed by: INTERNAL MEDICINE

## 2022-09-19 PROCEDURE — 3700000001 HC ADD 15 MINUTES (ANESTHESIA)

## 2022-09-19 PROCEDURE — 0JH606Z INSERTION OF PACEMAKER, DUAL CHAMBER INTO CHEST SUBCUTANEOUS TISSUE AND FASCIA, OPEN APPROACH: ICD-10-PCS | Performed by: INTERNAL MEDICINE

## 2022-09-19 PROCEDURE — 2580000003 HC RX 258

## 2022-09-19 PROCEDURE — 85027 COMPLETE CBC AUTOMATED: CPT

## 2022-09-19 PROCEDURE — 33208 INSRT HEART PM ATRIAL & VENT: CPT | Performed by: INTERNAL MEDICINE

## 2022-09-19 PROCEDURE — 2580000003 HC RX 258: Performed by: INTERNAL MEDICINE

## 2022-09-19 PROCEDURE — 2000000000 HC ICU R&B

## 2022-09-19 PROCEDURE — 33208 INSRT HEART PM ATRIAL & VENT: CPT

## 2022-09-19 PROCEDURE — C1898 LEAD, PMKR, OTHER THAN TRANS: HCPCS

## 2022-09-19 PROCEDURE — 99233 SBSQ HOSP IP/OBS HIGH 50: CPT | Performed by: INTERNAL MEDICINE

## 2022-09-19 PROCEDURE — C1785 PMKR, DUAL, RATE-RESP: HCPCS

## 2022-09-19 PROCEDURE — 02HK3JZ INSERTION OF PACEMAKER LEAD INTO RIGHT VENTRICLE, PERCUTANEOUS APPROACH: ICD-10-PCS | Performed by: INTERNAL MEDICINE

## 2022-09-19 PROCEDURE — 02H63JZ INSERTION OF PACEMAKER LEAD INTO RIGHT ATRIUM, PERCUTANEOUS APPROACH: ICD-10-PCS | Performed by: INTERNAL MEDICINE

## 2022-09-19 PROCEDURE — 3700000000 HC ANESTHESIA ATTENDED CARE

## 2022-09-19 PROCEDURE — 36415 COLL VENOUS BLD VENIPUNCTURE: CPT

## 2022-09-19 RX ORDER — HYDROMORPHONE HCL 110MG/55ML
0.5 PATIENT CONTROLLED ANALGESIA SYRINGE INTRAVENOUS
Status: DISCONTINUED | OUTPATIENT
Start: 2022-09-19 | End: 2022-09-20 | Stop reason: HOSPADM

## 2022-09-19 RX ORDER — SODIUM CHLORIDE 9 MG/ML
INJECTION, SOLUTION INTRAVENOUS PRN
Status: DISCONTINUED | OUTPATIENT
Start: 2022-09-19 | End: 2022-09-20 | Stop reason: HOSPADM

## 2022-09-19 RX ORDER — DIPHENHYDRAMINE HYDROCHLORIDE 50 MG/ML
12.5 INJECTION INTRAMUSCULAR; INTRAVENOUS
Status: ACTIVE | OUTPATIENT
Start: 2022-09-19 | End: 2022-09-19

## 2022-09-19 RX ORDER — OXYCODONE HYDROCHLORIDE 5 MG/1
5 TABLET ORAL EVERY 4 HOURS PRN
Status: DISCONTINUED | OUTPATIENT
Start: 2022-09-19 | End: 2022-09-20 | Stop reason: HOSPADM

## 2022-09-19 RX ORDER — PROPOFOL 10 MG/ML
INJECTION, EMULSION INTRAVENOUS CONTINUOUS PRN
Status: DISCONTINUED | OUTPATIENT
Start: 2022-09-19 | End: 2022-09-19 | Stop reason: SDUPTHER

## 2022-09-19 RX ORDER — HYDROMORPHONE HCL 110MG/55ML
0.5 PATIENT CONTROLLED ANALGESIA SYRINGE INTRAVENOUS EVERY 5 MIN PRN
Status: DISCONTINUED | OUTPATIENT
Start: 2022-09-19 | End: 2022-09-20 | Stop reason: HOSPADM

## 2022-09-19 RX ORDER — MEPERIDINE HYDROCHLORIDE 50 MG/ML
12.5 INJECTION INTRAMUSCULAR; INTRAVENOUS; SUBCUTANEOUS EVERY 5 MIN PRN
Status: DISCONTINUED | OUTPATIENT
Start: 2022-09-19 | End: 2022-09-20 | Stop reason: HOSPADM

## 2022-09-19 RX ORDER — LABETALOL HYDROCHLORIDE 5 MG/ML
10 INJECTION, SOLUTION INTRAVENOUS
Status: DISCONTINUED | OUTPATIENT
Start: 2022-09-19 | End: 2022-09-20 | Stop reason: HOSPADM

## 2022-09-19 RX ORDER — VANCOMYCIN HYDROCHLORIDE 1 G/20ML
INJECTION, POWDER, LYOPHILIZED, FOR SOLUTION INTRAVENOUS PRN
Status: DISCONTINUED | OUTPATIENT
Start: 2022-09-19 | End: 2022-09-19 | Stop reason: SDUPTHER

## 2022-09-19 RX ORDER — SODIUM CHLORIDE 0.9 % (FLUSH) 0.9 %
5-40 SYRINGE (ML) INJECTION EVERY 12 HOURS SCHEDULED
Status: DISCONTINUED | OUTPATIENT
Start: 2022-09-19 | End: 2022-09-20 | Stop reason: HOSPADM

## 2022-09-19 RX ORDER — OXYCODONE HYDROCHLORIDE 5 MG/1
10 TABLET ORAL PRN
Status: ACTIVE | OUTPATIENT
Start: 2022-09-19 | End: 2022-09-19

## 2022-09-19 RX ORDER — PHENYLEPHRINE HCL IN 0.9% NACL 1 MG/10 ML
SYRINGE (ML) INTRAVENOUS PRN
Status: DISCONTINUED | OUTPATIENT
Start: 2022-09-19 | End: 2022-09-19 | Stop reason: SDUPTHER

## 2022-09-19 RX ORDER — OXYCODONE HYDROCHLORIDE 5 MG/1
10 TABLET ORAL EVERY 4 HOURS PRN
Status: DISCONTINUED | OUTPATIENT
Start: 2022-09-19 | End: 2022-09-20 | Stop reason: HOSPADM

## 2022-09-19 RX ORDER — SODIUM CHLORIDE 9 MG/ML
25 INJECTION, SOLUTION INTRAVENOUS PRN
Status: DISCONTINUED | OUTPATIENT
Start: 2022-09-19 | End: 2022-09-20 | Stop reason: HOSPADM

## 2022-09-19 RX ORDER — HYDROMORPHONE HCL 110MG/55ML
0.25 PATIENT CONTROLLED ANALGESIA SYRINGE INTRAVENOUS
Status: DISCONTINUED | OUTPATIENT
Start: 2022-09-19 | End: 2022-09-20 | Stop reason: HOSPADM

## 2022-09-19 RX ORDER — SODIUM CHLORIDE 0.9 % (FLUSH) 0.9 %
5-40 SYRINGE (ML) INJECTION PRN
Status: DISCONTINUED | OUTPATIENT
Start: 2022-09-19 | End: 2022-09-20 | Stop reason: HOSPADM

## 2022-09-19 RX ORDER — HYDROMORPHONE HCL 110MG/55ML
0.25 PATIENT CONTROLLED ANALGESIA SYRINGE INTRAVENOUS EVERY 5 MIN PRN
Status: DISCONTINUED | OUTPATIENT
Start: 2022-09-19 | End: 2022-09-20 | Stop reason: HOSPADM

## 2022-09-19 RX ORDER — ONDANSETRON 2 MG/ML
4 INJECTION INTRAMUSCULAR; INTRAVENOUS
Status: ACTIVE | OUTPATIENT
Start: 2022-09-19 | End: 2022-09-19

## 2022-09-19 RX ORDER — OXYCODONE HYDROCHLORIDE 5 MG/1
5 TABLET ORAL PRN
Status: ACTIVE | OUTPATIENT
Start: 2022-09-19 | End: 2022-09-19

## 2022-09-19 RX ORDER — SODIUM CHLORIDE 9 MG/ML
INJECTION, SOLUTION INTRAVENOUS CONTINUOUS PRN
Status: DISCONTINUED | OUTPATIENT
Start: 2022-09-19 | End: 2022-09-19 | Stop reason: SDUPTHER

## 2022-09-19 RX ADMIN — Medication 80 MCG: at 19:34

## 2022-09-19 RX ADMIN — Medication 80 MCG: at 19:29

## 2022-09-19 RX ADMIN — SODIUM CHLORIDE, PRESERVATIVE FREE 10 ML: 5 INJECTION INTRAVENOUS at 09:48

## 2022-09-19 RX ADMIN — PANTOPRAZOLE SODIUM 40 MG: 40 TABLET, DELAYED RELEASE ORAL at 07:00

## 2022-09-19 RX ADMIN — INSULIN GLARGINE 10 UNITS: 100 INJECTION, SOLUTION SUBCUTANEOUS at 21:29

## 2022-09-19 RX ADMIN — Medication 100 MCG: at 19:38

## 2022-09-19 RX ADMIN — ASPIRIN 81 MG 81 MG: 81 TABLET ORAL at 09:41

## 2022-09-19 RX ADMIN — GUAIFENESIN 600 MG: 600 TABLET, EXTENDED RELEASE ORAL at 21:28

## 2022-09-19 RX ADMIN — GUAIFENESIN 600 MG: 600 TABLET, EXTENDED RELEASE ORAL at 09:41

## 2022-09-19 RX ADMIN — PROPOFOL 20 MG: 10 INJECTION, EMULSION INTRAVENOUS at 19:08

## 2022-09-19 RX ADMIN — INSULIN LISPRO 1 UNITS: 100 INJECTION, SOLUTION INTRAVENOUS; SUBCUTANEOUS at 10:29

## 2022-09-19 RX ADMIN — TAMSULOSIN HYDROCHLORIDE 0.4 MG: 0.4 CAPSULE ORAL at 09:41

## 2022-09-19 RX ADMIN — Medication 120 MCG: at 19:42

## 2022-09-19 RX ADMIN — OXYCODONE HYDROCHLORIDE AND ACETAMINOPHEN 500 MG: 500 TABLET ORAL at 09:41

## 2022-09-19 RX ADMIN — SODIUM CHLORIDE: 9 INJECTION, SOLUTION INTRAVENOUS at 18:50

## 2022-09-19 RX ADMIN — PROPOFOL 100 MCG/KG/MIN: 10 INJECTION, EMULSION INTRAVENOUS at 18:50

## 2022-09-19 RX ADMIN — VANCOMYCIN HYDROCHLORIDE 1000 MG: 1 INJECTION, POWDER, LYOPHILIZED, FOR SOLUTION INTRAVENOUS at 18:57

## 2022-09-19 ASSESSMENT — PAIN SCALES - GENERAL
PAINLEVEL_OUTOF10: 0

## 2022-09-19 NOTE — PLAN OF CARE
Problem: Skin/Tissue Integrity  Goal: Absence of new skin breakdown  Description: 1. Monitor for areas of redness and/or skin breakdown  2. Assess vascular access sites hourly  3. Every 4-6 hours minimum:  Change oxygen saturation probe site  4. Every 4-6 hours:  If on nasal continuous positive airway pressure, respiratory therapy assess nares and determine need for appliance change or resting period.   Outcome: Progressing     Problem: ABCDS Injury Assessment  Goal: Absence of physical injury  Outcome: Progressing  Flowsheets (Taken 9/19/2022 1040)  Absence of Physical Injury: Implement safety measures based on patient assessment

## 2022-09-19 NOTE — PROGRESS NOTES
PM implant scheduled for 9/19/22. Hx ICM, SSS.     Device:[id number] ACCOLADE MRI [id number]    Latitude communicator given at implant

## 2022-09-19 NOTE — PROGRESS NOTES
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 Was transferred to [facility]. Surgery: yes - To cath lab for pacer placement 9/19/22     Nursing assessment at handoff  stable    Emergency Contact/POA: Ky Holcomb  Family updated: yes - at bedside    Most recent vitals: /64   Pulse 83   Temp 98.3 °F (36.8 °C) (Oral)   Resp 18   Ht 5' 10\" (1.778 m)   Wt 172 lb 6.4 oz (78.2 kg)   SpO2 98%   BMI 24.74 kg/m²      Rhythm: Normal Sinus Rhythm      NC/HFNC- 0 lpm  Respiratory support: - No ventilator support    Vent days: Day 0    Increased O2 requirements: no    Admission weight Weight: 172 lb 6.4 oz (78.2 kg)  Today's weight   Wt Readings from Last 1 Encounters:   09/19/22 172 lb 6.4 oz (78.2 kg)         UOP >30ml/hr: yes -      Krause need assessed each shift: no    Restraints: no  Order current and documentation up to date? Lines/Drains  LDA Insertion Date Discontinued Date Dressing Changes   PIV       TLC       Arterial       Krause       Vas Cath      ETT       Surgical drains        Night Shift Hospitalist Interventions    Problem(Brief) Date Time Intervention Physician contacted                                               Drip rates at handoff:    isoproterenol (ISUPREL) infusion      sodium chloride      dextrose         Hospital Course Daily Updates:  Admit Day# 1  -Taken to Cath Lab for Pacer placement and possible L heart cath. -Off Isuprel    Lab Data:   CBC:   Recent Labs     09/18/22  0829 09/19/22 0222   WBC 7.1 6.5   HGB 12.7* 12.9*   HCT 37.2* 36.4*   MCV 93.0 91.3    240     BMP:    Recent Labs     09/18/22  0829 09/19/22 0222   * 126*   K 4.0 3.9   CO2 26 24   BUN 7 5*   CREATININE 0.7* 0.6*     LIVR: No results for input(s): AST, ALT in the last 72 hours.   PT/INR: No results for input(s): PROT, INR in the last 72 hours. APTT: No results for input(s): APTT in the last 72 hours. ABG: No results for input(s): PHART, CHR5NGP, PO2ART in the last 72 hours.   Consults (if GI or Nephrology- which group?)-  Cardiology

## 2022-09-19 NOTE — PROGRESS NOTES
Pt taken to cath lab for procedure. Belongings were sent with spouse and family in waiting room. Patient removed from monitors and report given at bedside.

## 2022-09-19 NOTE — PROGRESS NOTES
Hospitalist Progress Note      PCP: Wesley Lund MD    Date of Admission: 9/16/2022    Chief Complaint: complete heart block    Hospital Course: reviewed     Subjective:  HR stable. Remains stable off Isuprel. No CP/SOB. Tentative plan for LHC and PPM today. Medications:  Reviewed    Infusion Medications    isoproterenol (ISUPREL) infusion      sodium chloride      dextrose       Scheduled Medications    guaiFENesin  600 mg Oral BID    ascorbic acid  500 mg Oral Daily    aspirin  81 mg Oral Daily    insulin glargine  10 Units SubCUTAneous Nightly    insulin lispro  0-4 Units SubCUTAneous TID WC    insulin lispro  0-4 Units SubCUTAneous Nightly    pantoprazole  40 mg Oral QAM AC    psyllium  1 packet Oral Daily with breakfast    sodium chloride flush  5-40 mL IntraVENous 2 times per day    tamsulosin  0.4 mg Oral Daily     PRN Meds: sodium chloride, acetaminophen **OR** acetaminophen, dextrose, dextrose bolus **OR** dextrose bolus, glucagon (rDNA), glucose, ondansetron **OR** ondansetron, sodium chloride flush, polyethylene glycol, perflutren lipid microspheres      Intake/Output Summary (Last 24 hours) at 9/19/2022 0932  Last data filed at 9/19/2022 0500  Gross per 24 hour   Intake --   Output 4000 ml   Net -4000 ml         Physical Exam Performed:    /79   Pulse 75   Temp 98.2 °F (36.8 °C) (Oral)   Resp 19   SpO2 98%     General appearance: No apparent distress, appears stated age and cooperative. HEENT: Pupils equal, round, and reactive to light. Conjunctivae/corneas clear. Neck: Supple, with full range of motion. No jugular venous distention. Trachea midline. Respiratory:  Normal respiratory effort. Clear to auscultation, bilaterally without Rales/Wheezes/Rhonchi. Cardiovascular: Regular rate and rhythm with normal S1/S2 without murmurs, rubs or gallops. Abdomen: Soft, non-tender, non-distended with normal bowel sounds. Musculoskeletal: No clubbing, cyanosis or edema bilaterally. Full range of motion without deformity. Skin: Skin color, texture, turgor normal.  No rashes or lesions. Neurologic:  Neurovascularly intact without any focal sensory/motor deficits. Cranial nerves: II-XII intact, grossly non-focal.  Psychiatric: Alert and oriented, thought content appropriate, normal insight  Capillary Refill: Brisk, 3 seconds, normal   Peripheral Pulses: +2 palpable, equal bilaterally       Labs:   Recent Labs     09/18/22 0829 09/19/22 0222   WBC 7.1 6.5   HGB 12.7* 12.9*   HCT 37.2* 36.4*    240       Recent Labs     09/18/22 0829 09/19/22 0222   * 126*   K 4.0 3.9   CL 95* 93*   CO2 26 24   BUN 7 5*   CREATININE 0.7* 0.6*   CALCIUM 8.5 8.4       No results for input(s): AST, ALT, BILIDIR, BILITOT, ALKPHOS in the last 72 hours. No results for input(s): INR in the last 72 hours. No results for input(s): Brook Aleman in the last 72 hours. Urinalysis:      Lab Results   Component Value Date/Time    NITRU Negative 09/14/2022 05:40 PM    BLOODU Negative 09/14/2022 05:40 PM    SPECGRAV <=1.005 09/14/2022 05:40 PM    GLUCOSEU >=1000 09/14/2022 05:40 PM       Assessment/Plan:    Active Hospital Problems    Diagnosis     Complete heart block ([address] [I44.2]      Priority: Medium     Syncope 2/2 Complete Heart Block- with concern for complete ht block/sinus pauses, possible sick sinus syndrome  -Stable off Isuprel  -ECHO showed EF 40-45%, hk of mid to basal inferior/inferoseptal/inferolateral walls, g1dd, mild mr  -Tentative plan for LHC and PPM today. GI bleed -> BRBPR  -CTA abdomen with no active bleeding, did show evidence of diverticulosis   -IV PPI was required  -GI consulted, Colonoscopy done 9/15 (single non-bleeding Cecal AVM s/p APC, diverticulosis, hemorrhoids.  No active bleeding)  -monitor h/h     New RBBB and Left posterior fascicular block   -noted at [facility]  -Plan for PPM as above     Cardiomyopathy- ?new diagnosis, noted on echo 9/17  -Tentative plan for LHC    CAD with stents   -no CP   -cardiology consulted   - on ASA and statin at home     DM   -Controlled  -Holding oral regimen   -Continue basal bolus regimen  -Monitor. HTN, controlled  -holding Fosinopril   -holding oral meds      HLD -on statin      Lymphoma, in remission         DVT Prophylaxis: scd given recent gib  Diet: ADULT DIET;  Clear Liquid  Code Status: Full Code  PT/OT Eval Status: not ordered    Dispo - Likely ok for C4 after LHC and PPM    Appropriate for A1 Discharge Unit: No    Jose Manuel Obando MD

## 2022-09-19 NOTE — ANESTHESIA PRE PROCEDURE
mouth daily. Historical Provider, MD   nitroGLYCERIN (NITROSTAT) 0.4 MG SL tablet Place 0.4 mg under the tongue every 5 minutes as needed. Historical Provider, MD   rosuvastatin (CRESTOR) 20 MG tablet Take 20 mg by mouth daily    Historical Provider, MD   fish oil-omega-3 fatty acids 1000 MG capsule Take 1 g by mouth daily 2capsule in the morning.   One at night    Historical Provider, MD       Current medications:    Current Facility-Administered Medications   Medication Dose Route Frequency Provider Last Rate Last Admin    guaiFENesin ([address]) extended release tablet 600 mg  600 mg Oral BID Mounika Ferguson MD   600 mg at 09/19/22 0941    ascorbic acid (VITAMIN C) tablet 500 mg  500 mg Oral Daily Mounika Ferguson MD   500 mg at 09/19/22 0941    aspirin chewable tablet 81 mg  81 mg Oral Daily Mounika Ferguson MD   81 mg at 09/19/22 0941    insulin glargine (LANTUS) injection vial 10 Units  10 Units SubCUTAneous Nightly Mounika Ferguson MD   10 Units at 09/18/22 2017    insulin lispro (HUMALOG) injection vial 0-4 Units  0-4 Units SubCUTAneous TID WC Mounika Ferguson MD   1 Units at 09/19/22 1029    insulin lispro (HUMALOG) injection vial 0-4 Units  0-4 Units SubCUTAneous Nightly Mounika Ferguson MD        pantoprazole (PROTONIX) tablet 40 mg  40 mg Oral QAM AC Mounika Ferguson MD   40 mg at 09/19/22 0700    psyllium (METAMUCIL) 58.12 % packet 1 packet  1 packet Oral Daily with breakfast Mounika Ferguson MD   1 packet at 09/18/22 0921    sodium chloride flush 0.9 % injection 5-40 mL  5-40 mL IntraVENous 2 times per day Mounika Ferguson MD   10 mL at 09/19/22 0948    tamsulosin (FLOMAX) capsule 0.4 mg  0.4 mg Oral Daily Mounika Ferguson MD   0.4 mg at 09/19/22 0941    isoproterenol (ISUPREL) 1 mg in dextrose 5 % 250 mL infusion  2 mcg/min IntraVENous Continuous Mounika Ferguson MD        0.9 % sodium chloride infusion   IntraVENous PRN Mounika Ferguson MD        acetaminophen (TYLENOL) tablet 650 mg  650 mg Oral Q6H PRN Trina Laura MD        Or   Yennifer Ramirez acetaminophen (TYLENOL) suppository 650 mg  650 mg Rectal Q6H PRN Trina Laura MD        dextrose 10 % infusion   IntraVENous Continuous PRN Trina Laura MD        dextrose bolus 10% 125 mL  125 mL IntraVENous PRN Trina Laura MD        Or    dextrose bolus 10% 250 mL  250 mL IntraVENous PRN Trina Laura MD        glucagon (rDNA) injection 1 mg  1 mg SubCUTAneous PRN Trina Laura MD        glucose chewable tablet 16 g  4 tablet Oral PRN Trina Laura MD        ondansetron (ZOFRAN-ODT) disintegrating tablet 4 mg  4 mg Oral Q8H PRN Trina Laura MD        Or    ondansetron [facility]) injection 4 mg  4 mg IntraVENous Q6H PRN Trina Laura MD        sodium chloride flush 0.9 % injection 5-40 mL  5-40 mL IntraVENous PRN Trina Laura MD        polyethylene glycol [facility]) packet 17 g  17 g Oral Daily PRN Trina Laura MD        perflutren lipid microspheres (DEFINITY) injection 1.65 mg  1.5 mL IntraVENous ONCE PRN J Theresa Joshi MD           Allergies:     Allergies   Allergen Reactions    Doxycycline Rash       Problem List:    Patient Active Problem List   Diagnosis Code    Hyperlipidemia E78.5    Diabetes mellitus ([address] E11.9    Lymphoma ([address] C85.90    DDD (degenerative disc disease) [id number]    Chronic bronchitis ([address] J42    Psoriasis L40.9    CAD (coronary artery disease) I25.10    History of non-Hodgkin's lymphoma Z85.72    GI bleed K92.2    Diverticulosis K57.90    Primary hypertension I10    Complete heart block ([address] I44.2    Syncope R55       Past Medical History:        Diagnosis Date    CAD (coronary artery disease) 3/15/2013    Cancer ([address]     DDD (degenerative disc disease) 9/25/2012    Diabetes mellitus ([address]     Herniated disc     Hyperlipidemia 9/25/2012    Hypertension        Past Surgical History:        Procedure Laterality Date    CHOLECYSTECTOMY  COLONOSCOPY  8/13/2014    COLONOSCOPY N/A 9/15/2022    COLONOSCOPY CONTROL HEMORRHAGE performed by Jessa Noguera MD at [facility]      OTHER SURGICAL HISTORY Right 3/3/2016    PORT REMOVAL        Social History:    Social History     Tobacco Use    Smoking status: Former    Smokeless tobacco: Former   Substance Use Topics    Alcohol use: No                                Counseling given: Not Answered      Vital Signs (Current):   Vitals:    09/19/22 1300 09/19/22 1400 09/19/22 1500 09/19/22 1600   BP: 139/67 131/69 123/76 128/64   Pulse: 88 95 86 83   Resp: 17 24 18 18   Temp:  98.4 °F (36.9 °C)  98.3 °F (36.8 °C)   TempSrc:  Oral  Oral   SpO2: 96% 97% 96% 98%   Weight:       Height:                                                  BP Readings from Last 3 Encounters:   09/19/22 128/64   09/16/22 (!) 159/76   07/25/22 (!) 130/59       NPO Status:                                                                                 BMI:   Wt Readings from Last 3 Encounters:   09/19/22 172 lb 6.4 oz (78.2 kg)   09/16/22 175 lb 8 oz (79.6 kg)   07/25/22 193 lb (87.5 kg)     Body mass index is 24.74 kg/m².     CBC:   Lab Results   Component Value Date/Time    WBC 6.5 09/19/2022 02:22 AM    RBC 3.99 09/19/2022 02:22 AM    RBC 5.01 02/20/2017 12:50 PM    HGB 12.9 09/19/2022 02:22 AM    HCT 36.4 09/19/2022 02:22 AM    MCV 91.3 09/19/2022 02:22 AM    RDW 12.7 09/19/2022 02:22 AM     09/19/2022 02:22 AM       CMP:   Lab Results   Component Value Date/Time     09/19/2022 02:22 AM    K 3.9 09/19/2022 02:22 AM    CL 93 09/19/2022 02:22 AM    CO2 24 09/19/2022 02:22 AM    BUN 5 09/19/2022 02:22 AM    CREATININE 0.6 09/19/2022 02:22 AM    GFRAA >60 09/19/2022 02:22 AM    GFRAA >60 04/03/2013 12:30 PM    AGRATIO 1.2 09/14/2022 03:13 PM    LABGLOM >60 09/19/2022 02:22 AM    GLUCOSE 180 09/19/2022 02:22 AM    GLUCOSE 219 08/22/2016 01:17 PM    PROT 7.2 09/14/2022 03:13 PM    PROT 6.4 08/22/2016 01:17 PM    CALCIUM 8.4 09/19/2022 02:22 AM    BILITOT 0.7 09/14/2022 03:13 PM    ALKPHOS 87 09/14/2022 03:13 PM    AST 17 09/14/2022 03:13 PM    ALT 13 09/14/2022 03:13 PM       POC Tests:   Recent Labs     09/19/22  1023   POCGLU 213*       Coags:   Lab Results   Component Value Date/Time    PROTIME 14.1 09/15/2022 08:09 AM    INR 1.11 09/15/2022 08:09 AM    APTT 38.4 09/15/2022 05:52 AM       HCG (If Applicable): No results found for: PREGTESTUR, PREGSERUM, HCG, HCGQUANT     ABGs: No results found for: PHART, PO2ART, NRC4QGC, ELK5YSS, BEART, J6ZIRQOQ     Type & Screen (If Applicable):  No results found for: LABABO, LABRH    Drug/Infectious Status (If Applicable):  No results found for: HIV, HEPCAB    COVID-19 Screening (If Applicable):   Lab Results   Component Value Date/Time    COVID19 Not Detected 09/14/2022 06:06 PM           Anesthesia Evaluation   no history of anesthetic complications:   Airway: Mallampati: II  TM distance: <3 FB   Neck ROM: limited  Mouth opening: > = 3 FB   Dental:    (+) upper dentures and lower dentures      Pulmonary:                             ROS comment: H/o tob   Cardiovascular:    (+) hypertension:, CAD:, CABG/stent (s/p PTCA with cor stent):, dysrhythmias (several episodes of complete heart block with syncope):, hyperlipidemia                  Neuro/Psych:   (+) neuromuscular disease:,             GI/Hepatic/Renal:   (+) GERD: well controlled,          ROS comment: Recent GI bleed. Endo/Other:    (+) DiabetesType II DM, using insulin, malignancy/cancer (lymphoma s/p chemo). Abdominal:             Vascular: negative vascular ROS. Other Findings:           Anesthesia Plan      MAC     ASA 4     (Risks, benefits and alternatives of MAC anesthesia discussed with pt. Questions answered. Willing to proceed.)  Induction: intravenous.       Anesthetic plan and risks discussed with patient and spouse.                         Gary Constantino MD   9/19/2022

## 2022-09-19 NOTE — CARE COORDINATION
CASE MANAGEMENT INITIAL ASSESSMENT      Reviewed chart and completed assessment with patient: at bedside  Family present:  no  Explained Case Management role/services. Primary contact information:     Health Care Decision Maker :   Primary Decision Maker: Rand Jean-Baptiste - [phone]          Can this person be reached and be able to respond quickly, such as within a few minutes or hours? Yes       Admit date/status:9/16 Inpatient  Diagnosis: Heart block   Is this a Readmission?:  No- [facility]: [employer]   Precert required for SNF: Yes       3 night stay required: No    Living arrangements, Adls, care needs, prior to admission: home with wife; [address] at home:  Walker__Cane_X_RTS__ BSC__Shower Chair__  02__ HHN__ CPAP__  BiPap__  Hospital Bed__ W/C___ Other_____    Services in the home and/or outpatient, prior to admission:none    Current PCP: Radha Gotti MD and Dr. Chanelle Rob at [facility]                                Medications:  Prescription coverage? Yes Will pt require financial assistance with medications  No     Transportation needs: family     Dialysis Facility (if applicable)   Name:  Address:  Dialysis Schedule:  Phone:  Fax:    PT/OT recs: not ordered    Hospital Exemption Notification (HEN): not initiated    Barriers to discharge: need permanent pacemaker    Plan/comments: Pt from home with wife. Transfer from [facility]. Was there for GI bleed and getting ready to dc when coded and transferred to [facility] for Cardiology. Pt is 80% [facility]. [facility] Rob and a PCP as well.       ECOC on chart for MD signature

## 2022-09-19 NOTE — PROGRESS NOTES
Electrophysiology Progress Note     Admit Date: 2022     Reason for follow up: Sinus arrest    Interval History:   - Patient seen and examined. - Clinical notes reviewed. - Telemetry reviewed. No significant recurrence off isuprel.  - No new complaints today. - No major events overnight.   - Denies having chest pain, shortness of breath, dyspnea on exertion, Orthopnea, PND at the time of this visit. Physical Examination:  Vitals:    22 1000   BP: (!) 147/110   Pulse: 82   Resp: 20   Temp: 98.3 °F (36.8 °C)   SpO2: 98%        Intake/Output Summary (Last 24 hours) at 2022 1122  Last data filed at 2022 1000  Gross per 24 hour   Intake 0 ml   Output 4375 ml   Net -4375 ml     In: 0   Out: 4625    Wt Readings from Last 3 Encounters:   22 172 lb 6.4 oz (78.2 kg)   22 175 lb 8 oz (79.6 kg)   22 193 lb (87.5 kg)     Temp  Av.3 °F (36.8 °C)  Min: 98 °F (36.7 °C)  Max: 98.6 °F (37 °C)  Pulse  Av.8  Min: 71  Max: 93  BP  Min: 106/50  Max: 159/66  SpO2  Av.7 %  Min: 89 %  Max: 100 %    Telemetry: Sinus rhythm   Constitutional: Alert. Oriented to person, place, and time. No distress. Head: Normocephalic and atraumatic. Mouth/Throat: Lips appear moist. Oropharynx is clear and moist.  Neck: Neck supple. No lymphadenopathy. No rigidity. No JVD present. Cardiovascular: Normal rate, regular rhythm. Normal S1&S2. Pulmonary/Chest: Bilateral respiratory sounds present. No respiratory accessory muscle use. No wheezes, No rhonchi. Abdominal: Soft. Normal bowel sounds present. No distension, No tenderness. No splenomegaly. No hernia. Musculoskeletal: No tenderness. No edema    Neurological: Alert and oriented. Cranial nerve II-XII grossly intact. .  Skin: Skin is warm and dry. No rash, lesions, ulcerations noted. Psychiatric: No anxiety nor agitation. Labs, diagnostic and imaging results reviewed. Reviewed.    Recent Labs     22  0822 * 126*   K 4.0 3.9   CL 95* 93*   CO2 26 24   BUN 7 5*   CREATININE 0.7* 0.6*     Recent Labs     09/18/22  0829 09/19/22 0222   WBC 7.1 6.5   HGB 12.7* 12.9*   HCT 37.2* 36.4*   MCV 93.0 91.3    240     Lab Results   Component Value Date/Time    TROPONINI <0.01 09/15/2022 11:54 AM     Estimated Creatinine Clearance: 106 mL/min (A) (based on SCr of 0.6 mg/dL (L)).    No results found for: BNP  Lab Results   Component Value Date/Time    PROTIME 14.1 09/15/2022 08:09 AM    PROTIME >120.0 09/15/2022 05:52 AM    PROTIME 14.5 09/14/2022 03:13 PM    INR 1.11 09/15/2022 08:09 AM    INR >16.29 09/15/2022 05:52 AM    INR 1.15 09/14/2022 03:13 PM     No results found for: CHOL, HDL, TRIG    Scheduled Meds:   guaiFENesin  600 mg Oral BID    ascorbic acid  500 mg Oral Daily    aspirin  81 mg Oral Daily    insulin glargine  10 Units SubCUTAneous Nightly    insulin lispro  0-4 Units SubCUTAneous TID WC    insulin lispro  0-4 Units SubCUTAneous Nightly    pantoprazole  40 mg Oral QAM AC    psyllium  1 packet Oral Daily with breakfast    sodium chloride flush  5-40 mL IntraVENous 2 times per day    tamsulosin  0.4 mg Oral Daily     Continuous Infusions:   isoproterenol (ISUPREL) infusion      sodium chloride      dextrose       PRN Meds:sodium chloride, acetaminophen **OR** acetaminophen, dextrose, dextrose bolus **OR** dextrose bolus, glucagon (rDNA), glucose, ondansetron **OR** ondansetron, sodium chloride flush, polyethylene glycol, perflutren lipid microspheres     Patient Active Problem List    Diagnosis Date Noted    Syncope 09/18/2022    Diverticulosis 09/16/2022    Primary hypertension 09/16/2022    Complete heart block ([facility] 09/16/2022    GI bleed 09/14/2022    History of non-Hodgkin's lymphoma 02/22/2016    CAD (coronary artery disease) 03/15/2013    Hyperlipidemia 09/25/2012    Diabetes mellitus ([facility] 09/25/2012    Lymphoma ([facility] 09/25/2012    DDD (degenerative disc disease) 09/25/2012    Chronic bronchitis ([facility] 09/25/2012    Psoriasis 09/25/2012      Active Hospital Problems    Diagnosis Date Noted    Complete heart block [facility]) [I44.2] 09/16/2022     Priority: Medium     Mr. Lizabeth Kocher is a pleasant 67year old male  with a medical history significant for hypertension, hyperlipidemia, diabetes mellitus type II, history of lymphoma, coronary artery disease status post PCI, heart failure with preserved ejection fraction, and history of first degree heart block who presented from home with hematochezia and was found to have AVMs and whose clinical course has been complicated by sinus arrest/pauses. Problem List:  1. Sick sinus syndrome. 2. Coronary artery disease status post PCI. 3. Colonic AVM. Assessment and Plan:  1. Sick sinus syndrome. 09/16/2022  Patient is a pleasant 67year old female with a medical history significant for ischemic cardiomyopathy status post PCI, hypertension, diabetes mellitus type II, heart failure with preserved ejection fraction, and first degree heart block who presented with hematochezia secondary to AVM and diverticulitis and was found to have symptomatic pauses with sinus arrest.  Patient very responsive to isuprel. No pauses since transfer however will plan to keep isuprel and limit physical activity. Plan for echocardiogram.  Troponin enzymes negative and EKG not consistent with ACS. We will plan on getting echocardiogram on Monday and moving forward with pacemaker. If LVEF is low then will ask for ischemic evaluation.  - Echocardiogram tomorrow. If normal then DC pacemaker. If abnormal then ischemic evaluation.  - Avoid suhas agents, not on at home. - PRN isuprel. - We will continue to follow along with you.    09/19/2022  Patient doing well. His LVEF is mildly depressed. Discussed briefly with cardiology. Hopeful for [facility] today then DC pacemaker with [facility].   - LHC with IC.  - DC pacemaker after [facility], may need to hold off but am hopeful can get done

## 2022-09-20 VITALS
RESPIRATION RATE: 17 BRPM | HEIGHT: 70 IN | SYSTOLIC BLOOD PRESSURE: 150 MMHG | OXYGEN SATURATION: 93 % | WEIGHT: 172.4 LBS | HEART RATE: 109 BPM | DIASTOLIC BLOOD PRESSURE: 51 MMHG | BODY MASS INDEX: 24.68 KG/M2 | TEMPERATURE: 98 F

## 2022-09-20 LAB
ANION GAP SERPL CALCULATED.3IONS-SCNC: 10 MMOL/L (ref 3–16)
BUN BLDV-MCNC: 9 MG/DL (ref 7–20)
CALCIUM SERPL-MCNC: 8.8 MG/DL (ref 8.3–10.6)
CHLORIDE BLD-SCNC: 95 MMOL/L (ref 99–110)
CO2: 27 MMOL/L (ref 21–32)
CREAT SERPL-MCNC: 0.8 MG/DL (ref 0.8–1.3)
GFR AFRICAN AMERICAN: >60
GFR NON-AFRICAN AMERICAN: >60
GLUCOSE BLD-MCNC: 194 MG/DL (ref 70–99)
GLUCOSE BLD-MCNC: 265 MG/DL (ref 70–99)
GLUCOSE BLD-MCNC: 292 MG/DL (ref 70–99)
HCT VFR BLD CALC: 40.4 % (ref 40.5–52.5)
HEMOGLOBIN: 14 G/DL (ref 13.5–17.5)
MCH RBC QN AUTO: 32.2 PG (ref 26–34)
MCHC RBC AUTO-ENTMCNC: 34.7 G/DL (ref 31–36)
MCV RBC AUTO: 92.9 FL (ref 80–100)
PDW BLD-RTO: 13 % (ref 12.4–15.4)
PERFORMED ON: ABNORMAL
PERFORMED ON: ABNORMAL
PLATELET # BLD: 284 K/UL (ref 135–450)
PMV BLD AUTO: 6.7 FL (ref 5–10.5)
POTASSIUM REFLEX MAGNESIUM: 4.4 MMOL/L (ref 3.5–5.1)
RBC # BLD: 4.35 M/UL (ref 4.2–5.9)
SODIUM BLD-SCNC: 132 MMOL/L (ref 136–145)
WBC # BLD: 7.5 K/UL (ref 4–11)

## 2022-09-20 PROCEDURE — 80048 BASIC METABOLIC PNL TOTAL CA: CPT

## 2022-09-20 PROCEDURE — 36415 COLL VENOUS BLD VENIPUNCTURE: CPT

## 2022-09-20 PROCEDURE — 6370000000 HC RX 637 (ALT 250 FOR IP): Performed by: INTERNAL MEDICINE

## 2022-09-20 PROCEDURE — 2580000003 HC RX 258: Performed by: INTERNAL MEDICINE

## 2022-09-20 PROCEDURE — 85027 COMPLETE CBC AUTOMATED: CPT

## 2022-09-20 PROCEDURE — 6370000000 HC RX 637 (ALT 250 FOR IP): Performed by: NURSE PRACTITIONER

## 2022-09-20 PROCEDURE — 99233 SBSQ HOSP IP/OBS HIGH 50: CPT | Performed by: NURSE PRACTITIONER

## 2022-09-20 RX ORDER — METOPROLOL SUCCINATE 50 MG/1
50 TABLET, EXTENDED RELEASE ORAL DAILY
Qty: 30 TABLET | Refills: 3 | Status: SHIPPED | OUTPATIENT
Start: 2022-09-21

## 2022-09-20 RX ORDER — CEPHALEXIN 500 MG/1
500 CAPSULE ORAL 2 TIMES DAILY
Qty: 10 CAPSULE | Refills: 0 | Status: SHIPPED | OUTPATIENT
Start: 2022-09-20 | End: 2022-09-25

## 2022-09-20 RX ORDER — METOPROLOL SUCCINATE 50 MG/1
50 TABLET, EXTENDED RELEASE ORAL DAILY
Status: DISCONTINUED | OUTPATIENT
Start: 2022-09-20 | End: 2022-09-20 | Stop reason: HOSPADM

## 2022-09-20 RX ORDER — CEPHALEXIN 250 MG/1
500 CAPSULE ORAL 2 TIMES DAILY
Status: DISCONTINUED | OUTPATIENT
Start: 2022-09-20 | End: 2022-09-20 | Stop reason: HOSPADM

## 2022-09-20 RX ADMIN — INSULIN LISPRO 2 UNITS: 100 INJECTION, SOLUTION INTRAVENOUS; SUBCUTANEOUS at 09:53

## 2022-09-20 RX ADMIN — OXYCODONE 5 MG: 5 TABLET ORAL at 00:14

## 2022-09-20 RX ADMIN — PANTOPRAZOLE SODIUM 40 MG: 40 TABLET, DELAYED RELEASE ORAL at 08:05

## 2022-09-20 RX ADMIN — INSULIN LISPRO 2 UNITS: 100 INJECTION, SOLUTION INTRAVENOUS; SUBCUTANEOUS at 12:39

## 2022-09-20 RX ADMIN — TAMSULOSIN HYDROCHLORIDE 0.4 MG: 0.4 CAPSULE ORAL at 09:05

## 2022-09-20 RX ADMIN — METOPROLOL SUCCINATE 50 MG: 50 TABLET, EXTENDED RELEASE ORAL at 09:59

## 2022-09-20 RX ADMIN — ASPIRIN 81 MG 81 MG: 81 TABLET ORAL at 09:05

## 2022-09-20 RX ADMIN — OXYCODONE 5 MG: 5 TABLET ORAL at 05:31

## 2022-09-20 RX ADMIN — GUAIFENESIN 600 MG: 600 TABLET, EXTENDED RELEASE ORAL at 09:05

## 2022-09-20 RX ADMIN — OXYCODONE HYDROCHLORIDE AND ACETAMINOPHEN 500 MG: 500 TABLET ORAL at 09:05

## 2022-09-20 RX ADMIN — Medication 10 ML: at 10:00

## 2022-09-20 RX ADMIN — CEPHALEXIN 500 MG: 250 CAPSULE ORAL at 12:44

## 2022-09-20 ASSESSMENT — PAIN DESCRIPTION - LOCATION
LOCATION: SHOULDER
LOCATION: SHOULDER

## 2022-09-20 ASSESSMENT — PAIN SCALES - GENERAL
PAINLEVEL_OUTOF10: 0
PAINLEVEL_OUTOF10: 5
PAINLEVEL_OUTOF10: 5

## 2022-09-20 ASSESSMENT — PAIN DESCRIPTION - ORIENTATION
ORIENTATION: LEFT
ORIENTATION: LEFT

## 2022-09-20 NOTE — PROGRESS NOTES
Sep 20, 2022[facility] System[facility] 2022  [facility] alert check not performed on [facility] transmissions. 1 ATR since Sep 19, 2022  Approximate time to explant: > 11 years from Sep 20, 2022    9/16/2022 - present (4 days)  [facility] implant 9/19/22. Latitude home communicator still needs paired to pt device    See PACEART report under Cardiology tab.

## 2022-09-20 NOTE — DISCHARGE SUMMARY
Hospital Medicine Discharge Summary    Patient: Mateo Sebastian     Age: 65 y.o. Gender: male  : 1945   MRN: [id number]  Code status: Full code    Admit Date: 2022   Discharge Date: 2022    Disposition:  Home    Condition at Discharge: Stable    Primary Care Provider: Chandan Le MD    Admitting Physician: Torsten Arreaga MD  Discharge Physician: Diana Ordonez MD     Discharge Diagnoses: Active Hospital Problems    Diagnosis     Complete heart block (HCC) [I44.2]      Priority: Medium    CAD (coronary artery disease) [I25.10]        Hospital Course:     Assessment/Plan:    Syncope 2/2 Complete Heart Block- with concern for complete ht block/sinus pauses, possible sick sinus syndrome  -Stable off Isuprel  -ECHO showed EF 40-45%, hk of mid to basal inferior/inferoseptal/inferolateral walls, g1dd, mild mr  -PPM      GI bleed -> BRBPR  -CTA abdomen with no active bleeding, did show evidence of diverticulosis   -IV PPI was required  -GI consulted, Colonoscopy done 9/15 (single non-bleeding Cecal AVM s/p APC, diverticulosis, hemorrhoids. No active bleeding)     New RBBB and Left posterior fascicular block   -noted at [facility]  - PPM as above     Cardiomyopathy- ?new diagnosis, noted on echo     CAD with stents   -no CP   -cardiology consulted   -Recommend follow-up with primary Cardiologist for further ischemic evaluation    DM   -Controlled  -Resume home regimen. HTN, controlled    HLD -on statin      Lymphoma, in remission        Exam:   BP (!) 150/51   Pulse (!) 109   Temp 98 °F (36.7 °C) (Oral)   Resp 17   Ht 5' 10\" (1.778 m)   Wt 172 lb 6.4 oz (78.2 kg)   SpO2 93%   BMI 24.74 kg/m²   General appearance: No apparent distress, appears stated age and cooperative. HEENT: Pupils equal, round, and reactive to light. Conjunctivae/corneas clear. Neck: Supple, with full range of motion. No jugular venous distention. Trachea midline. Respiratory:  Normal respiratory effort.  Clear to auscultation, bilaterally without Rales/Wheezes/Rhonchi. Cardiovascular: Regular rate and rhythm with normal S1/S2 without murmurs, rubs or gallops. Abdomen: Soft, non-tender, non-distended with normal bowel sounds. Musculoskeletal: No clubbing, cyanosis or edema bilaterally. Full range of motion without deformity. Skin: Skin color, texture, turgor normal.  No rashes or lesions. Neurologic:  Neurovascularly intact without any focal sensory/motor deficits. Cranial nerves: II-XII intact, grossly non-focal.  Psychiatric: Alert and oriented, thought content appropriate, normal insight  Capillary Refill: Brisk, 3 seconds, normal   Peripheral Pulses: +2 palpable, equal bilaterally     Patient Discharge Instructions: Follow up:  1. Primary Care Provider Denver Reynoso MD in the next 1-2 weeks. 2.  EP Cardiology [facility]  3.   Primary Cardiologist at [facility] for possible ischemic evaluation      Discharge Medications:   Discharge Medication List as of 9/20/2022 12:56 PM        START taking these medications    Details   metoprolol succinate (TOPROL XL) 50 MG extended release tablet Take 1 tablet by mouth daily, Disp-30 tablet, R-3Normal      cephALEXin (KEFLEX) 500 MG capsule Take 1 capsule by mouth 2 times daily for 10 doses, Disp-10 capsule, R-0Normal           Discharge Medication List as of 9/20/2022 12:56 PM        Discharge Medication List as of 9/20/2022 12:56 PM        CONTINUE these medications which have NOT CHANGED    Details   insulin glargine (LANTUS) 100 UNIT/ML injection vial Inject 35 Units into the skin nightlyHistorical Med      metFORMIN (GLUCOPHAGE) 1000 MG tablet Take 1 tablet by mouth 2 times daily (with meals)Historical Med      psyllium (METAMUCIL) 58.12 % PACK packet Take 1 packet by mouth daily (with breakfast), Disp-30 each, R-1Normal      tamsulosin (FLOMAX) 0.4 MG capsule Take 1 capsule by mouth daily, Disp-30 capsule, R-1Normal      pantoprazole (PROTONIX) 40 MG tablet Take 1 tablet by mouth every morning (before breakfast), Disp-30 tablet, R-1Normal      alogliptin (NESINA) 25 MG TABS tablet Take 25 mg by mouth dailyHistorical Med      diclofenac sodium (VOLTAREN) 1 % GEL Apply topically 4 times daily as needed for Pain, Topical, 4 TIMES DAILY PRN, Historical Med      empagliflozin (JARDIANCE) 25 MG tablet Take 25 mg by mouth dailyHistorical Med      Multiple Vitamins-Minerals (THERAPEUTIC MULTIVITAMIN-MINERALS) tablet Take 1 tablet by mouth dailyHistorical Med      ascorbic acid (VITAMIN C) 500 MG tablet Take 500 mg by mouth dailyHistorical Med      Coenzyme Q10 (CO Q 10) 10 MG CAPS Take 1 capsule by mouth dailyHistorical Med      magnesium oxide (MAG-OX) 400 MG tablet Take 420 mg by mouth dailyHistorical Med      aspirin 81 MG tablet Take 81 mg by mouth daily. Historical Med      fosinopril (MONOPRIL) 40 MG tablet Take 40 mg by mouth daily. Historical Med      nitroGLYCERIN (NITROSTAT) 0.4 MG SL tablet Place 0.4 mg under the tongue every 5 minutes as needed. Historical Med      rosuvastatin (CRESTOR) 20 MG tablet Take 20 mg by mouth dailyHistorical Med      fish oil-omega-3 fatty acids 1000 MG capsule Take 1 g by mouth daily 2capsule in the morning. One at nightHistorical Med           Discharge Medication List as of 9/20/2022 12:56 PM        STOP taking these medications       azithromycin (ZITHROMAX) 250 MG tablet Comments:   Reason for Stopping:                 Significant Test Results    No results found. Consults:     IP CONSULT TO CARDIOLOGY    Labs:  For convenience and continuity at follow-up the following most recent labs are provided:    Lab Results   Component Value Date/Time    WBC 7.5 09/20/2022 08:07 AM    HGB 14.0 09/20/2022 08:07 AM    HCT 40.4 09/20/2022 08:07 AM    MCV 92.9 09/20/2022 08:07 AM     09/20/2022 08:07 AM     09/20/2022 08:07 AM    K 4.4 09/20/2022 08:07 AM    CL 95 09/20/2022 08:07 AM    CO2 27 09/20/2022 08:07 AM    BUN 9 09/20/2022 08:07 AM    CREATININE 0.8 09/20/2022 08:07 AM    CALCIUM 8.8 09/20/2022 08:07 AM    PHOS 3.7 02/15/2016 01:16 PM    TROPONINI <0.01 09/15/2022 11:54 AM    ALKPHOS 87 09/14/2022 03:13 PM    ALT 13 09/14/2022 03:13 PM    AST 17 09/14/2022 03:13 PM    BILITOT 0.7 09/14/2022 03:13 PM    LABALBU 3.9 09/14/2022 03:13 PM    LABA1C 6.8 09/14/2022 03:13 PM     Lab Results   Component Value Date    INR 1.11 09/15/2022    INR >16.29 (HH) 09/15/2022    INR 1.15 (H) 09/14/2022         The patient was seen and examined on day of discharge and this discharge summary is in conjunction with any daily progress note from day of discharge. Time spent on discharge is more than 30 minutes in the examination, evaluation, counseling and review of medications and discharge plan.       Signed:    Bryn Murphy MD   10/16/2022

## 2022-09-20 NOTE — PROGRESS NOTES
[address]     Electrophysiology                                     Progress Note    Admission date:  2022    Reason for follow up visit: SSS, CM     HPI/CC: Cloria Severs was admitted to [facility] on 2022 with rectal bleeding. Endoscopy showed AVM's. Patient noted to have syncope and prolonged sinus arrest and was transferred to [facility] ICU. Echo showed an EF of 40-45%. On 2022, he had a dual chamber pacemaker implanted. Device check and CXR normal from a device standpoint. Post procedure, Toprol was started for GDMT and sinus tachycardia. Rhythm has been SR/ST. Subjective: He complains of some anxiety and is anxious for discharge. Denies chest pain, palpitations, shortness of breath, and dizziness. Vitals:  Blood pressure 127/73, pulse 97, temperature 98 °F (36.7 °C), temperature source Oral, resp. rate 16, height 5' 10\" (1.778 m), weight 172 lb 6.4 oz (78.2 kg), SpO2 98 %.   Temp  Av.2 °F (36.8 °C)  Min: 98 °F (36.7 °C)  Max: 98.4 °F (36.9 °C)  Pulse  Av.1  Min: 77  Max: 118  BP  Min: 107/37  Max: 168/65  SpO2  Av.9 %  Min: 92 %  Max: 100 %    24 hour I/O    Intake/Output Summary (Last 24 hours) at 2022 1014  Last data filed at 2022 0900  Gross per 24 hour   Intake 880 ml   Output 1000 ml   Net -120 ml     Current Facility-Administered Medications   Medication Dose Route Frequency Provider Last Rate Last Admin    metoprolol succinate (TOPROL XL) extended release tablet 50 mg  50 mg Oral Daily WENDIE Godinez - CNP   50 mg at 22 0959    sodium chloride flush 0.9 % injection 5-40 mL  5-40 mL IntraVENous 2 times per day Julita Ramos MD        sodium chloride flush 0.9 % injection 5-40 mL  5-40 mL IntraVENous PRN Julita Ramos MD        0.9 % sodium chloride infusion  25 mL IntraVENous PRN Julita Ramos MD        meperidine (DEMEROL) injection 12.5 mg  12.5 mg IntraVENous Q5 Min PRN Julita Ramos MD HYDROmorphone (DILAUDID) injection 0.25 mg  0.25 mg IntraVENous Q5 Min PRN Sukumar Oquendo MD        HYDROmorphone (DILAUDID) injection 0.5 mg  0.5 mg IntraVENous Q5 Min PRN Sukumar Oquendo MD        labetalol (NORMODYNE;TRANDATE) injection 10 mg  10 mg IntraVENous Q15 Min PRN Sukumar Oquendo MD        sodium chloride flush 0.9 % injection 5-40 mL  5-40 mL IntraVENous 2 times per day [place] Daily., MD   10 mL at 09/20/22 1000    sodium chloride flush 0.9 % injection 5-40 mL  5-40 mL IntraVENous PRN Talon Daily., MD        0.9 % sodium chloride infusion   IntraVENous PRN [place] Daily., MD        oxyCODONE (ROXICODONE) immediate release tablet 5 mg  5 mg Oral Q4H PRN BAUDILIO Payne., MD   5 mg at 09/20/22 0531    Or    oxyCODONE (ROXICODONE) immediate release tablet 10 mg  10 mg Oral Q4H PRN BAUDILIO Payne., MD        HYDROmorphone (DILAUDID) injection 0.25 mg  0.25 mg IntraVENous Q3H PRN Talon Daily., MD        Or    HYDROmorphone (DILAUDID) injection 0.5 mg  0.5 mg IntraVENous Q3H PRN [place] Daily.MD [facility]) extended release tablet 600 mg  600 mg Oral BID Maxi Bartholomew MD   600 mg at 09/20/22 6677    ascorbic acid (VITAMIN C) tablet 500 mg  500 mg Oral Daily Maxi Bartholomew MD   500 mg at 09/20/22 1882    aspirin chewable tablet 81 mg  81 mg Oral Daily Maxi Bartholomew MD   81 mg at 09/20/22 0905    insulin glargine (LANTUS) injection vial 10 Units  10 Units SubCUTAneous Nightly Maxi Bartholomew MD   10 Units at 09/19/22 2129    insulin lispro (HUMALOG) injection vial 0-4 Units  0-4 Units SubCUTAneous TID  Maxi Bartholomew MD   2 Units at 09/20/22 0953    insulin lispro (HUMALOG) injection vial 0-4 Units  0-4 Units SubCUTAneous Nightly Maxi Bartholomew MD        pantoprazole (PROTONIX) tablet 40 mg  40 mg Oral QAM AC Maxi Bartholomew MD   40 mg at 09/20/22 0805    psyllium (METAMUCIL) 58.12 % packet 1 packet 1 packet Oral Daily with breakfast Chad Moreno MD   1 packet at 09/18/22 7228    sodium chloride flush 0.9 % injection 5-40 mL  5-40 mL IntraVENous 2 times per day Chad Moreno MD   10 mL at 09/19/22 0948    tamsulosin (FLOMAX) capsule 0.4 mg  0.4 mg Oral Daily Chad Moreno MD   0.4 mg at 09/20/22 1903    isoproterenol (ISUPREL) 1 mg in dextrose 5 % 250 mL infusion  2 mcg/min IntraVENous Continuous Chad Moreno MD        0.9 % sodium chloride infusion   IntraVENous PRN Chad Moreno MD        acetaminophen (TYLENOL) tablet 650 mg  650 mg Oral Q6H PRN Chad Moreno MD        Or    acetaminophen (TYLENOL) suppository 650 mg  650 mg Rectal Q6H PRN Chad Moreno MD        dextrose 10 % infusion   IntraVENous Continuous PRN Cahd Moreno MD        dextrose bolus 10% 125 mL  125 mL IntraVENous PRN Chad Moreno MD        Or    dextrose bolus 10% 250 mL  250 mL IntraVENous PRN Chad Moreno MD        glucagon (rDNA) injection 1 mg  1 mg SubCUTAneous PRN Chad Moreno MD        glucose chewable tablet 16 g  4 tablet Oral PRN Chad Moreno MD        ondansetron (ZOFRAN-ODT) disintegrating tablet 4 mg  4 mg Oral Q8H PRN Chad Moreno MD        Or    ondansetron [facility]) injection 4 mg  4 mg IntraVENous Q6H PRN Chad Moreno MD        sodium chloride flush 0.9 % injection 5-40 mL  5-40 mL IntraVENous PRN Chad Moreno MD        polyethylene glycol (GLYCOLAX) packet 17 g  17 g Oral Daily PRN Chad Moreno MD        perflutren lipid microspheres (DEFINITY) injection 1.65 mg  1.5 mL IntraVENous ONCE PRN Moni Rehman MD           Objective:     Telemetry monitor: SR/ST    Physical Exam:  Constitutional and general appearance: alert, cooperative, no distress, and appears stated age  [de-identified]: PERRL, no cervical lymphadenopathy. No masses palpable.  Normal oral mucosa  Respiratory:  Normal excursion and expansion without use of accessory muscles  Resp was seen outside of global device window for CAD and CM.     CXR and device check reviewed with Dr. Joel Lemons, [address]  [phone]

## 2022-09-20 NOTE — PLAN OF CARE
Problem: Chronic Conditions and Co-morbidities  Goal: Patient's chronic conditions and co-morbidity symptoms are monitored and maintained or improved  Flowsheets (Taken 9/20/2022 2030 by Rylee Encinas RN)  Care Plan - Patient's Chronic Conditions and Co-Morbidity Symptoms are Monitored and Maintained or Improved: Monitor and assess patient's chronic conditions and comorbid symptoms for stability, deterioration, or improvement

## 2022-09-20 NOTE — ANESTHESIA POSTPROCEDURE EVALUATION
Department of Anesthesiology  Postprocedure Note    Patient: Jl Robles  MRN: [id number]  YOB: 1945  Date of evaluation: 9/19/2022      Procedure Summary     Date: 09/19/22 Room / Location: [facility] Cardiac Cath Lab    Anesthesia Start: [facility] Anesthesia Stop: 2006    Procedure: PACEMAKER Diagnosis:     Scheduled Providers:  Responsible Provider: Maricarmen Mooney MD    Anesthesia Type: MAC ASA Status: 4          Anesthesia Type: No value filed. Zelda Phase I:      Zelda Phase II:        Anesthesia Post Evaluation    Patient location during evaluation: PACU  Patient participation: complete - patient participated  Level of consciousness: awake and alert  Airway patency: patent  Nausea & Vomiting: no nausea and no vomiting  Complications: no  Cardiovascular status: blood pressure returned to baseline  Respiratory status: acceptable  Hydration status: euvolemic  Comments: VSS on transfer to phase 2 recovery. No anesthetic complications.

## 2022-09-20 NOTE — PROGRESS NOTES
Pt d/c'd to home with family. Removed both right and left peripheral IV and stopped bleeding. Catheter intact. Pt tolerated well. No redness noted at site. Notified CMU and removed tele box. Reviewed d/c instructions, home meds, and  f/u information utilizing teach-back method. Scripts for new medications and med's to bed given to patient. Patient verbalized understanding and was taken outside via wheelchair.

## 2022-09-20 NOTE — CARE COORDINATION
Discharge ordered. Writer met with pt, confirmed he has no new discharge needs, wife on her way to pick him up.   EUNICE Arnold-RN

## 2022-09-20 NOTE — PROCEDURES
[facility]  Electrophysiology Report      Attending MD Joe Chen MD    Procedures   Dual chamber pacemaker    Indications   Sick sinus syndrome    Complication None  EBL  < 20cc    Sedation: Provided by anesthesia    Conclusion   Successful dual chamber pacemaker    Description of Procedure  The patient was brought to the electrophysiology laboratory in the fasting state after informed consent was obtained. The patient was placed in the supine position and the left pectoral area was prepared and draped in a sterile fashion. The electrocardiogram, blood pressure, and oxygenation were monitored intra- and post-procedure. Intravenous antibiotic was given prior to the procedure for general antibiotic prophylaxis. After using buffered lidocaine 1% with epinephrine (1/100,000) for local anesthesia to the left pectoral area, venous access was obtained in the axillary vein at the level of the first rib via the modified Seldinger technique. A 4-5 cm incision was made inferior to the left clavicle. The subcutaneous tissues were dissected to the level of the pre-pectoral fascia and a pocket was fashioned via blunt dissection. Axillary vein access was then obtained from inside the pocket via the modified Seldinger technique. A 6 F sheath was inserted into the left subclavian over a J-wire. The right ventricular lead was inserted and the lead tip positioned at the RV apex under fluoroscopic guidance. Once appropriate placement was obtained and threshold measurements made to 's specifications, the lead was anchored to the pectoralis fascia using 2-0 Ethibond Excel. A 6 F sheath was then inserted into the left subclavian vein over a J-wire. The right atrial lead was inserted and the lead tip positioned in the RA appendage under fluoroscopic guidance.   Threshold measurements were obtained to the 's specifications, and the lead was anchored to the pectoralis fascia using 2-0 Ethibond Excel.  After hemostasis was assured, the pulse generator was connected to the atrial and ventricular leads and appropriate lead pacing/impedance was confirmed. The device was anchored to the pocket using a 2-0 Ethibond Excel and then placed into the pocket with care to ensure the leads were positioned beneath the device within TyRx pouch. The pocket was irrigated with antibiotic solution. The fascia was closed using interrupted vicryl sutures. The skin and subcutaneous tissues were approximated using running 3-0 Vicryl sutures, and the wound was sealed with Steri-strips. A sterile dressing was applied to the site. The patient tolerated the procedure well and there were no complications. At the conclusion of the procedure, all sponges and sharps were accounted for by two counts. The attending physician, Teri Calvin MD was present for the entire procedure and for interpretation of data. Device and Lead Information  Pulse Generator Model # Manfacturer Serial # Location   Q101 [facility] Enerplant [id number] left pectoral, subcutaneous     Lead Model Number Manfacturer Serial Number Lead position   RA [employer] (QEEP) [id number] RA appendage   RV [address] [id number] RV apex     Lead Sensing and Thresholds  Lead R/P sensing (mV) Threshold (V) Threshold PW (msec) Impedance (?) Final Voltage (V) Final PW (msec)   RA 2.8 0.8 0.4 529 3.5 0.4   RV 6.6 0.9 0.4 658 3.5 0.4     Bradycardia Settings  Levi Mode LRL URL Pace AVD (ms) Sense AVD (ms) Mode Switching Mode SW Rate   DDDR 60 130 220 200      Proceduralist Notes:  - Successful DC pacemaker implant.  - Attempted to place RV lead on high septum however low R waves. - TyRx pouch used given age and hospital stay. - Doxycycline x 5 days. - CXR in am.  - 5L of oxygen overnight.

## 2022-09-20 NOTE — DISCHARGE INSTRUCTIONS
FOLLOW-UP APPOINTMENTS    [place] OFFICE - Follow-up appointment on September 27th at [address] for a device check, [address]. You and your one visitor will need to have your mouth and nose covered with a mask. No children please. [facility],  [facility], [address] [place] [address] [address], [address] Office #: [phone]. If you are unable to make this appointment, please call to reschedule. [place] OFFICE - Follow-up appointment on November 1st at 1:30am with Chaparrita Cam CNP, [address]. You and your one visitor will need to have your mouth and nose covered with a mask. No children please. [facility],  [facility], [address] [place] [address] [address], [address]. Office #: [phone]. If you are unable to make this appointment, please call to reschedule. [place] OFFICE - Follow-up appointment on January 9th at 11am for a for a device check and office visit with Chaparrita Cam CNP, [address]. You and your one visitor will need to have your mouth and nose covered with a mask. No children please. [facility],  [facility], [address] [place] [address] [address], [address]. Office #: [phone]. If you are unable to make this appointment, please call to reschedule. Directions to [place] [address] towards [place]. [address] [facility] exit. Right off exit. Cross over [employer]. Right on [address]. Left into hospital. Follow the signs to the emergency room ( turn left toward the Emergency room). Go right at the first stop sign. Just past the Emergency room at the second stop sign turn right and go up the ramp and park on the top level if possible. Go in the glass doors of the [facility] on the top level of the garage [address]. As soon as you get in the door turn left and our office is the one with the glass doors.       Pacemaker/ICD Discharge Instructions   You have had a pacemaker/ defibrillator implanted (or inserted) into your body. This small electronic device regulates your hearts electrical system, which helps your heart beat at the right pace. You can probably do almost everything you did before you got your device. See your doctor regularly to help make sure that you stay healthy. 1. Keep the dressing dry and intact over the incision site until the day following the procedure. You may take a tub bath, but do not get the dressing wet. 2. On the third day after the procedure remove the outside dressing and leave the incision open to air. Steri-strips should remain on the incision until the follow-up visit with your physician. Continue to keep this area dry. If you have the white fabric fabric bandage, you may leave this in place. 3. Take your pain medication as prescribed and directed by your physician. 4. Bruising and a small amount of swelling is to be expected. Please call [facility] at  [phone] Shanna Norman)  to report any unusual amount of drainage, odor, or fever over 100 degrees Farenheit. 5. Call during office hours (Monday through Friday 7:30-5:00) to scheduling office at [phone] [address] Norman)  to arrange your follow-up visit. The appointment should occur 7-10 days following the procedure. Generally, you will be seen in the Pacemaker clinic during this visit. Activity:   * Avoid lifting objects heavier than 10 pounds for one month on the side of the device. * Do not raise the arm on the side the device was implanted over your head for one month. * No driving for one week until initial visit after your procedure. * Sling and swathe to affected arm for at least 24 hours. These rules help to heal the implant site and stabilize the heart lead wires. Wound Care:   * Do not shower for seven days. Do not submerge your incision in any water for seven days (i.e. Tub bath, swimming pool). * Keep the incision dry and clean.    * Remove the clear plastic dressing no later than three days after your surgery. Do not remove the thin pieces of tape. They will be removed at the office visit. If they fall off naturally do not reapply. * Avoid tight clothes over the incision. Do not rub or twist the pacer/ICD. Carry your card:   * Carry your temporary pacer/ICD card with you until your permanent card arrives in four to six weeks. An ID card should always be with you. Other precautions:   * Notify your doctor, dentist, or any other healthcare provider that you have a pacemaker or ICD before you receive any treatment. * Carry an ID card that contains information about your pacemaker. You may need to show this card to security personnel if your pacemaker/ICD sets off a metal detector. * Keep your cellular phone away from your pacemaker/ICD. Dont carry the phone in your shirt pocket, even when its turned off. * Avoid strong magnets, such as those used in an MRI or in hand-held security wands. Unless your pacemaker is compatible with MRI   * Avoid strong electrical fields such as those made by radio transmitting towers, ham radios, and heavy-duty electrical equipment. * Avoid leaning over the open lackey of a running car. A running engine creates an electrical field. What's Ok:   * [employer] and other appliances that are in good repair. * Computers, hair dryers, power tools, radios, televisions, stereos, electric blankets, vacuum , heating pads, and cars are all okay to use. Call your doctor if you have:   * Increase swelling and/or tenderness in incision. * Redness of incision or drainage from incision. * If a suture works its way through the incision. * Fever, unexplained temperature greater than 100 degrees. * If you receive a shock or hear a tone from the device call the [employer] numbers listed above to schedule an appointment in the device clinic. * Heart failure: Unusual swelling of lower legs/feet, chest discomfort, unusual weakness or fatigue.

## 2022-09-20 NOTE — PLAN OF CARE
Problem: Discharge Planning  Goal: Discharge to home or other facility with appropriate resources  9/20/2022 1211 by Zainab Mansfield RN  Outcome: Completed  9/20/2022 0144 by Ananda Pardo RN  Outcome: Progressing  Flowsheets (Taken 9/19/2022 2030)  Discharge to home or other facility with appropriate resources: Identify barriers to discharge with patient and caregiver     Problem: Pain  Goal: Verbalizes/displays adequate comfort level or baseline comfort level  9/20/2022 1211 by Zainab Mansfield RN  Outcome: Completed  9/20/2022 0144 by Ananda Pardo RN  Outcome: Progressing  Flowsheets (Taken 9/19/2022 2012)  Verbalizes/displays adequate comfort level or baseline comfort level:   Encourage patient to monitor pain and request assistance   Assess pain using appropriate pain scale     Problem: Skin/Tissue Integrity  Goal: Absence of new skin breakdown  Description: 1. Monitor for areas of redness and/or skin breakdown  2. Assess vascular access sites hourly  3. Every 4-6 hours minimum:  Change oxygen saturation probe site  4. Every 4-6 hours:  If on nasal continuous positive airway pressure, respiratory therapy assess nares and determine need for appliance change or resting period.   9/20/2022 1211 by Zainab Mansfield RN  Outcome: Completed  9/20/2022 0144 by Ananda Pardo RN  Outcome: Progressing     Problem: ABCDS Injury Assessment  Goal: Absence of physical injury  Outcome: Completed     Problem: Safety - Adult  Goal: Free from fall injury  Outcome: Completed

## 2022-09-20 NOTE — OP NOTE
[address]                 [address] [place] [address]                                OPERATIVE REPORT    PATIENT NAME: Kobi Ding                      :        1945  MED REC NO:   [id number]                          ROOM:         ACCOUNT NO:   [de-identified]                           ADMIT DATE: 2022  PROVIDER:     Brennon Sofia MD    DATE OF PROCEDURE:  09/15/2022    PREPROCEDURE DIAGNOSIS:  Rectal bleeding. PROCEDURE:  Colonoscopy to the cecum with control of hemorrhage. POSTPROCEDURE DIAGNOSES:  1. Single nonbleeding 5-mm AVM in the cecum, status post APC. 2.  Moderate diffuse diverticulosis. 3.  Internal hemorrhoids. 4.  No active bleeding. 5.  Suspect rectal bleeding secondary to outlet source such as  hemorrhoids versus diverticulosis. PROCEDURE INDICATIONS:  This is a 69-year-old male with history of  hypertension, hyperlipidemia, diabetes, coronary artery disease,  degenerative disk disease, lymphoma status post chemotherapy, admitted  with bright red blood per rectum. His hemoglobin has declined steadily  from a baseline of 14 down to 12.9. Did not require any blood  transfusions. He developed acute onset of painless hematochezia prior  to admission. He also feels lightheaded and presyncopal.  He denies any  previous such symptoms. His last colonoscopy was over 10 years ago. He  denies any recent NSAID use. Colonoscopy is being performed to rule out  GI source of bleeding. He did have a CTA of the abdomen and pelvis,  which showed no evidence of active bleeding. MEDICATIONS:  MAC per Anesthesia. PROCEDURE DETAILS:  Informed consent obtained after discussing risks,  benefits, alternatives. Full history and physical was performed. The  patient was classified as ASA class III. Medications were sequentially  given by Anesthesia.   Cardiopulmonary status was continuously monitored  throughout the procedure. The patient was placed in left lateral  decubitus position. Once adequately sedated, a standard pediatric  colonoscope was inserted in the anus and advanced to the cecum  identified by landmarks of appendiceal orifice and IC valve. Entire  mucosa of the cecum, ascending colon, transverse colon, descending  colon, sigmoid colon, rectum were examined carefully during withdrawal.   The patient tolerated the procedure well without any difficulties. The  colon prep was good. FINDINGS:  RECTUM:  Digital rectal exam was normal.  No masses were felt. COLON:  There was a single nonbleeding 5-mm AVM in the cecum. This was  treated with APC successfully. There was no evidence of active  bleeding. Remaining examined colon mucosa appeared normal and healthy  without any evidence of inflammation, ulcers, pseudomembranes, polyps or  masses. There was evidence of moderate-to-severe diverticulosis in the  left colon, particularly the sigmoid. Retroflexed view of the rectum  showed internal hemorrhoids. The patient's rectal bleeding is most  likely due to diverticulosis versus internal hemorrhoids. SUMMARY:  1. Nonbleeding cecal AVM, status post APC. 2.  Moderate sigmoid diverticulosis, likely the source of the patient's  recent self-limited GI bleed. 3.  Internal hemorrhoids. RECOMMENDATIONS:  1. Return the patient to floor for continuous medical care. 2.  Monitor hemoglobin. 3.  Observe for signs of bleeding. 4.  Advance to high-fiber diet. 5.  Start Metamucil daily. 6.  Okay to use anticoagulation as needed from GI standpoint.     EBL: <5mL    Tiera Malagon MD    D: 09/20/2022 12:59:58       T: 09/20/2022 13:02:15     [id number]  Job#: [id number]     Doc#: [id number]    CC:  MD Wolfgang Bennett MD

## 2022-09-20 NOTE — PLAN OF CARE
Problem: Discharge Planning  Goal: Discharge to home or other facility with appropriate resources  Outcome: Progressing  Flowsheets (Taken 9/19/2022 2030)  Discharge to home or other facility with appropriate resources: Identify barriers to discharge with patient and caregiver     Problem: Pain  Goal: Verbalizes/displays adequate comfort level or baseline comfort level  Outcome: Progressing  Flowsheets (Taken 9/19/2022 2012)  Verbalizes/displays adequate comfort level or baseline comfort level:   Encourage patient to monitor pain and request assistance   Assess pain using appropriate pain scale     Problem: Skin/Tissue Integrity  Goal: Absence of new skin breakdown  Description: 1. Monitor for areas of redness and/or skin breakdown  2. Assess vascular access sites hourly  3. Every 4-6 hours minimum:  Change oxygen saturation probe site  4. Every 4-6 hours:  If on nasal continuous positive airway pressure, respiratory therapy assess nares and determine need for appliance change or resting period.   Outcome: Progressing

## 2022-09-20 NOTE — PROGRESS NOTES
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 Was transferred to [facility]. Surgery: yes - To cath lab for pacer placement 9/19/22     Nursing assessment at handoff  stable    Emergency Contact/POA: Iva Evans  Family updated: yes - at bedside    Most recent vitals: /63   Pulse 80   Temp 98.3 °F (36.8 °C) (Oral)   Resp 16   Ht 5' 10\" (1.778 m)   Wt 172 lb 6.4 oz (78.2 kg)   SpO2 99%   BMI 24.74 kg/m²      Rhythm: Normal Sinus Rhythm      NC/HFNC- 0 lpm  Respiratory support: - No ventilator support    Vent days: Day 0    Increased O2 requirements: no    Admission weight Weight: 172 lb 6.4 oz (78.2 kg)  Today's weight   Wt Readings from Last 1 Encounters:   09/19/22 172 lb 6.4 oz (78.2 kg)         UOP >30ml/hr: yes -      Krause need assessed each shift: no    Restraints: no  Order current and documentation up to date? Lines/Drains  LDA Insertion Date Discontinued Date Dressing Changes   PIV       TLC       Arterial       Krause       Vas Cath      ETT       Surgical drains        Night Shift Hospitalist Interventions    Problem(Brief) Date Time Intervention Physician contacted                                               Drip rates at handoff:    sodium chloride      sodium chloride      isoproterenol (ISUPREL) infusion      sodium chloride      dextrose         Hospital Course Daily Updates:  Admit Day# 1  -Taken to Cath Lab for Pacer placement and possible L heart cath. -Off Isuprel    9/19/21 nights:  No acute events overnight.     Lab Data:   CBC:   Recent Labs     09/18/22  0829 09/19/22 0222   WBC 7.1 6.5   HGB 12.7* 12.9*   HCT 37.2* 36.4*   MCV 93.0 91.3    240       BMP:    Recent Labs     09/18/22 0829 09/19/22 0222   * 126*   K 4.0 3.9   CO2 26 24   BUN 7 5*   CREATININE 0.7* 0.6*       LIVR: No results for input(s): AST, ALT in the last 72 hours. PT/INR: No results for input(s): PROT, INR in the last 72 hours. APTT: No results for input(s): APTT in the last 72 hours. ABG: No results for input(s): PHART, PLW1MYD, PO2ART in the last 72 hours.   Consults (if GI or Nephrology- which group?)-  Cardiology

## 2022-09-21 ENCOUNTER — CARE COORDINATION (OUTPATIENT)
Dept: CASE MANAGEMENT | Age: 77
End: 2022-09-21

## 2022-09-21 ENCOUNTER — TELEPHONE (OUTPATIENT)
Dept: CARDIOLOGY CLINIC | Age: 77
End: 2022-09-21

## 2022-09-21 NOTE — TELEPHONE ENCOUNTER
Spoke with pts wife and she has follow up questions after pacemaker was placed on 09/19/2022. Pts wife stated that they do not wifi. Please advise.

## 2022-09-21 NOTE — CARE COORDINATION
[facility] 45 Transitions Initial Follow Up Call    Call within 2 business days of discharge: Yes    Patient: Natalie Jones Patient : 1945   MRN: [id number]  Reason for Admission: complete heart block  PMH hypertension, hyperlipidemia, diabetes mellitus type II, history of lymphoma, coronary artery disease status post PCI, heart failure with preserved ejection fraction  Discharge Date: 22 RARS: Readmission Risk Score: 15      Last Discharge [facility] 77       Date Complaint Diagnosis Description Type Department Provider    22   Admission (Discharged) [place] [id number] Jose Smith MD             Spoke with: Natalie Jones and [address]: [facility]    Non-face-to-face services provided:  Obtained and reviewed discharge summary and/or continuity of care documents    Transitions of Care Initial Call  Challenges to be reviewed by the provider   Additional needs identified to be addressed with provider: No  none             Method of communication with provider : none    Advance Care Planning:   Does patient have an Advance Directive: not on file. Care Transition Nurse contacted the patient by telephone to perform post hospital discharge assessment. Verified name and  with family as identifiers. Provided introduction to self, and explanation of the CTN role. CTN reviewed discharge instructions, medical action plan and red flags with family who verbalized understanding. Family given an opportunity to ask questions and does not have any further questions or concerns at this time. Were discharge instructions available to patient? Yes. Reviewed appropriate site of care based on symptoms and resources available to patient including: PCP  Specialist. The patient agrees to contact the PCP office for questions related to their healthcare. Medication reconciliation was performed with patient, who verbalizes understanding of administration of home medications.  Advised obtaining a 90-day supply of all daily and as-needed medications. Was patient discharged with a pulse oximeter? no    Patient answered call and verified . Patient pleasant and agreeable to transition call. Request that CTN speak to wife, Samir Hunter due to hearing deficit. Patient is feeling well and slept good last night. Medications reviewed full medication reconciliation and confirmed that he is taking as directed. CTN reviewed pacemaker instructions, wound care, and post-procedure restrictions. Wife to reach out to cardiac office due to patient not having Internet service at their home. Patient is needing remote transfers of pacemaker and had further questions about transferring of results. Denied any acute needs at present time. Educated on the use of urgent care or physicians 24 hr access line if assistance is needed after hours. CTN provided contact information. No further follow-up call indicated based on severity of symptoms and risk factors.     Care Transitions 24 Hour Call    Do you have a copy of your discharge instructions?: Yes  Do you have all of your prescriptions and are they filled?: Yes  Have you been contacted by a [address]?: No  Have you scheduled your follow up appointment?: Yes  How are you going to get to your appointment?: Car - family or friend to transport  Do you have support at home?: Partner/Spouse/SO  Do you feel like you have everything you need to keep you well at home?: Yes  Are you an active caregiver in your home?: No  Care Transitions Interventions         Follow Up  Future Appointments   Date Time Provider Cj Meier   2022 10:00 AM SCHEDULE, [facility]   2022  1:30 PM WENDIE Tran CNP [facility]   2022  7:15 AM SCHEDULE, [facility]   2023 11:00 AM SCHEDULE, [facility]   2023 11:00 AM WENDIE Tran CNP [facility]       Elijah Rodriguez RN

## 2022-09-22 NOTE — PROGRESS NOTES
Physician Progress Note      PATIENT:               Maria Del Carmen Brink  CSN #:                  [id number]  :                       1945  ADMIT DATE:       2022 2:46 PM  [address] DATE:        2022 4:47 PM  RESPONDING  PROVIDER #:        Radha Lundberg MD          QUERY TEXT:    Patient admitted with GIB, noted to have Blood cultures 2 out of 2 positive   for coag negative staph. Patient was on outpatient Zithromax continued to complete course. If possible,   please document in progress notes and discharge summary if you are evaluating   and/or treating any of the following: The medical record reflects the following:  Risk Factors: advanced age, GIB, lymphoma-in remission, DM,CAD  Clinical Indicators: per dc summary: Blood cultures 2 out of 2 positive for   coag negative staph. Patient was on outpatient Zithromax continued to complete course. Treatment: blood cultures, Zithromax    Thank you for your assistance,  Martha Tovar RN,BSN,CCDS,CRCR  Options provided:  -- Bacteremia  -- Other - I will add my own diagnosis  -- Disagree - Not applicable / Not valid  -- Disagree - Clinically unable to determine / Unknown  -- Refer to Clinical Documentation Reviewer    PROVIDER RESPONSE TEXT:    The patient treated for bacteremia.     Query created by: Meredith Phelps on 2022 12:30 PM      Electronically signed by:  Radha Lundberg MD 2022 3:07 PM

## 2022-09-27 ENCOUNTER — NURSE ONLY (OUTPATIENT)
Dept: CARDIOLOGY CLINIC | Age: 77
End: 2022-09-27
Payer: MEDICARE

## 2022-09-27 ENCOUNTER — TELEPHONE (OUTPATIENT)
Dept: CARDIOLOGY CLINIC | Age: 77
End: 2022-09-27

## 2022-09-27 DIAGNOSIS — I44.30 AVB (ATRIOVENTRICULAR BLOCK): ICD-10-CM

## 2022-09-27 DIAGNOSIS — I49.5 SICK SINUS SYNDROME (HCC): ICD-10-CM

## 2022-09-27 DIAGNOSIS — I44.2 COMPLETE HEART BLOCK (HCC): Primary | ICD-10-CM

## 2022-09-27 DIAGNOSIS — Z95.0 PACEMAKER: ICD-10-CM

## 2022-09-27 PROCEDURE — 93280 PM DEVICE PROGR EVAL DUAL: CPT | Performed by: INTERNAL MEDICINE

## 2022-09-27 RX ORDER — CEPHALEXIN 500 MG/1
500 CAPSULE ORAL 2 TIMES DAILY
Qty: 14 CAPSULE | Refills: 0 | Status: SHIPPED | OUTPATIENT
Start: 2022-09-27 | End: 2022-10-04

## 2022-09-27 NOTE — PATIENT INSTRUCTIONS
New Cardiac Device Implant (Pacemaker and/or Defibrillator) Post Op Instructions  Bathe with water indirectly hitting the incision site. Water and soap may run over the incision site. Do not scrub. Pat dry with a clean towel after bathing. Leave incision open to the air; do not apply any dressings, ointments, or bandages to the area. Do not apply lotion, perfume/cologne, or powders to the area until it is completely healed. Any scabbing or skin glue that is noted will fall off within 1-2 weeks after the post op appointment. If any oozing, bleeding, or pus drainage occurs, please call the office immediately at [phone]. Patient has movement restrictions in place until 4 weeks post op (to the day of implant) unless otherwise instructed by physician. Patient may not lift the device side arm above shoulder height. Do not far reach or stretch across body or behind body with effected side. Do not use this arm for pushing, pulling, or lifting body. Do not use cane on the effected side. Patient may not lift anything heavier than a gallon of milk with the associated arm. Appointments to expect going forward:  Post operatively the patient will have had a 1-week post op check, a 1 month follow up with NP/MD, and a 3 month follow up with NP/MD and the device clinic. Remote Monitoring Instructions    Within 2-3 weeks of your device being implanted, you will receive a call from the  of your device. Please answer this call as it is to set up remote monitoring for your device. Once you receive your in-home monitor, please follow the instructions provided to sync the home monitor to your implanted device. Once you have paired your home monitor to your implanted device, keep your monitor plugged in within 6 feet of where you sleep. Your monitor will routinely check in with your device during sleep hours and transmit any urgent events to the [address] [place] [address] Drive for review.      Please do not send additional routine transmissions unless specifically requested by the office staff - the steps to send a manual transmission are the same as when you paired your in-home monitor to your device for the first time. Your device and monitor are wireless and most transmit cellularly, but please periodically check your monitor is still plugged in to the electrical outlet. If you still use the telephone land line to send, please ensure the connection to the phone dami is secure. This will help to ensure successful automatic transmissions in the future. Please be aware that the remote device transmission sites are periodically monitored only during regular business hours during which simultaneous in-office device clinics are being conducted. If your transmission requires attention, we will contact you as soon as possible. Your in-home monitor will do a full report on your device every 3 months (recurring appointments that run parallel to in office checks). You do not need to initiate a transmission or be awake at the appointment time listed - this is solely for office purposes. Our office utilizes the \"no news is good news\" narrative regarding remote monitoring. A Device Specialist or RN will contact you with any critical findings from your remote monitoring. Going forward, if you experience issues with your in-home monitor, please verify that it is plugged in to the wall. If issues persist, please contact the Customer Service numbers provided below, as they can troubleshoot issues that may be happening with the monitor itself. The [facility] works closely with the remote monitoring websites - if we notice there is a disconnection we will contact you to inform you and ask you to contact the  of your device.     Jessenia Bailey [phone]

## 2022-11-01 ENCOUNTER — OFFICE VISIT (OUTPATIENT)
Dept: CARDIOLOGY CLINIC | Age: 77
End: 2022-11-01
Payer: MEDICARE

## 2022-11-01 VITALS
SYSTOLIC BLOOD PRESSURE: 138 MMHG | DIASTOLIC BLOOD PRESSURE: 70 MMHG | OXYGEN SATURATION: 93 % | HEIGHT: 70 IN | BODY MASS INDEX: 26.11 KG/M2 | WEIGHT: 182.4 LBS | HEART RATE: 73 BPM

## 2022-11-01 DIAGNOSIS — I49.5 SICK SINUS SYNDROME (HCC): Primary | ICD-10-CM

## 2022-11-01 DIAGNOSIS — Z95.0 PACEMAKER: ICD-10-CM

## 2022-11-01 DIAGNOSIS — I44.30 AVB (ATRIOVENTRICULAR BLOCK): ICD-10-CM

## 2022-11-01 PROCEDURE — 99214 OFFICE O/P EST MOD 30 MIN: CPT

## 2022-11-01 PROCEDURE — 3078F DIAST BP <80 MM HG: CPT

## 2022-11-01 PROCEDURE — 1123F ACP DISCUSS/DSCN MKR DOCD: CPT

## 2022-11-01 PROCEDURE — 3074F SYST BP LT 130 MM HG: CPT

## 2022-11-01 NOTE — PROGRESS NOTES
[facility]   Electrophysiology Outpatient Note              Date:  November 1, 2022  Patient name: Twin Chiang  YOB: 1945    Primary Care physician: Mikael Mascorro MD    HISTORY OF PRESENT ILLNESS: The patient is a 65 y.o.  male with a history of SSS with sinus arrest, syncope, ST, bifascicular block, cardiomyopathy, CAD, HTN, HLD, DM, colonic AVM, and diverticulitis. In 9/2022 he was admitted for rectal bleeding. EGD showed AVM's. He had a syncopal event with prolonged sinus arrest. Echo showed an EF of 40-45%. On 9/19/2022, he had a dual chamber pacemaker implanted. He follows with [address] [place] [address] cardiology for a history of CAD with stents. Today he is being seen for sick sinus syndrome and sinus arrest. On 9/27/2022 device interrogation showed <1% AP, <1% , no arrhythmias, 15 years left on battery. He has been feeling well since he was discharged from the hospital. He denies pain in his left chest wall area. He denies dizziness and chest pain. He feels well with activity. He uses a walker for far distances. He has more energy than he used to and takes less naps. He heard from his oncologist and he does not need to follow up with them anymore. He has a follow up with his cardiologist tomorrow. Past Medical History:   has a past medical history of CAD (coronary artery disease), Cancer ([address] DDD (degenerative disc disease), Diabetes mellitus ([address] Herniated disc, Hyperlipidemia, and Hypertension. Past Surgical History:   has a past surgical history that includes Cholecystectomy; Coronary angioplasty with stent; Colonoscopy (8/13/2014); other surgical history (Right, 3/3/2016); and Colonoscopy (N/A, 9/15/2022). Home Medications:    Prior to Admission medications    Medication Sig Start Date End Date Taking?  Authorizing Provider   B COMPLEX VITAMINS ER PO Take by mouth daily   Yes Historical Provider, MD   metoprolol succinate (TOPROL XL) 50 MG extended release tablet Take 1 tablet by mouth daily 9/21/22  Yes WENDIE Godinez - CNP   insulin glargine (LANTUS) 100 UNIT/ML injection vial Inject 34 Units into the skin nightly 9/16/22  Yes Declan Ramires MD   metFORMIN (GLUCOPHAGE) 1000 MG tablet Take 1 tablet by mouth 2 times daily (with meals) 9/17/22  Yes Declan Ramires MD   psyllium (METAMUCIL) 58.12 % PACK packet Take 1 packet by mouth daily (with breakfast) 9/17/22  Yes Declan Ramires MD   tamsulosin (FLOMAX) 0.4 MG capsule Take 1 capsule by mouth daily 9/17/22  Yes Declan Ramires MD   pantoprazole (PROTONIX) 40 MG tablet Take 1 tablet by mouth every morning (before breakfast)  Patient taking differently: Take 40 mg by mouth as needed 9/16/22  Yes Declan Ramires MD   alogliptin (NESINA) 25 MG TABS tablet Take 25 mg by mouth daily   Yes Historical Provider, MD   diclofenac sodium (VOLTAREN) 1 % GEL Apply topically 4 times daily as needed for Pain   Yes Historical Provider, MD   empagliflozin (JARDIANCE) 25 MG tablet Take 25 mg by mouth daily   Yes Historical Provider, MD   Multiple Vitamins-Minerals (THERAPEUTIC MULTIVITAMIN-MINERALS) tablet Take 1 tablet by mouth daily   Yes Historical Provider, MD   ascorbic acid (VITAMIN C) 500 MG tablet Take 500 mg by mouth daily   Yes Historical Provider, MD   Coenzyme Q10 (CO Q 10) 10 MG CAPS Take 1 capsule by mouth daily   Yes Historical Provider, MD   magnesium oxide (MAG-OX) 400 MG tablet Take 420 mg by mouth daily   Yes Historical Provider, MD   aspirin 81 MG tablet Take 81 mg by mouth daily. Yes Historical Provider, MD   fosinopril (MONOPRIL) 40 MG tablet Take 40 mg by mouth daily. Yes Historical Provider, MD   nitroGLYCERIN (NITROSTAT) 0.4 MG SL tablet Place 0.4 mg under the tongue every 5 minutes as needed.    Yes Historical Provider, MD   rosuvastatin (CRESTOR) 20 MG tablet Take 20 mg by mouth daily   Yes Historical Provider, MD   fish oil-omega-3 fatty acids 1000 MG capsule Take 1 g by mouth daily 2capsule in the morning. One at night   Yes Historical Provider, MD     Allergies:  Doxycycline    Social History:   reports that he has quit smoking. He has quit using smokeless tobacco. He reports that he does not drink alcohol and does not use drugs. Family History: family history includes Cancer in his father; Heart Disease in his mother; Stroke in his mother. All 14 point review of systems are completed and pertinent positives are mentioned in the history of present illness. Other systems are reviewed and are negative. PHYSICAL EXAM:    Vital signs:    /70   Pulse 73   Ht 5' 10\" (1.778 m)   Wt 182 lb 6.4 oz (82.7 kg)   SpO2 93%   BMI 26.17 kg/m²      Constitutional and general appearance: alert, cooperative, no distress, and appears stated age  HEENT: PERRL, no cervical lymphadenopathy. No masses palpable. Normal oral mucosa  Respiratory:  Normal excursion and expansion without use of accessory muscles  Resp auscultation: Normal breath sounds without wheezing, rhonchi, and rales  Cardiovascular: The apical impulse is not displaced  Heart tones are crisp and normal. regular S1 and S2.  Jugular venous pulsation Normal  The carotid upstroke is normal in amplitude and contour without delay or bruit  Peripheral pulses are symmetrical and full   Abdomen:  No masses or tenderness  Bowel sounds present  Extremities:   No cyanosis or clubbing   No lower extremity edema   Skin: warm and dry  Neurological:  Alert and oriented  Moves all extremities well  No abnormalities of mood, affect, memory, mentation, or behavior are noted    DATA:     Echo 9/2022:   Summary   Overall, left ventricular systolic function is mildly depressed with an   estimated ejection fraction of 40-45%. There is hypokinesis of the mid to basal inferior, inferoseptal, and   inferolateral walls. Normal left ventricle size and wall thickness. Grade I diastolic dysfunction with normal filling pressure. Mild mitral regurgitation.    Aortic valve appears sclerotic but opens adequately. Unable to estimate pulmonary artery pressure secondary to incomplete TR jet   envelope. The right ventricle is normal in size and function. Echo 2018:    Summary:   The Aortic Valve leaflets appear sclerotic. No hemodynamically significant valvular aortic stenosis. The mitral valve leaflets are mildly calcified in appearance. The diastolic function is impaired and classified as Grade 1   (impaired relaxation). The left ventricular function is low normal.   Overall left ventricular ejection fraction is estimated to be 50-55%. There is borderline global hypokinesis of the left ventricle. All labs and testing reviewed. CARDIOLOGY LABS:   CBC: No results for input(s): WBC, HGB, HCT, PLT in the last 72 hours. BMP: No results for input(s): NA, K, CO2, BUN, CREATININE, LABGLOM, GLUCOSE in the last 72 hours. PT/INR: No results for input(s): PROTIME, INR in the last 72 hours. APTT:No results for input(s): APTT in the last 72 hours. FASTING LIPID PANEL:No results found for: HDL, LDLDIRECT, LDLCALC, TRIG  LIVER PROFILE:No results for input(s): AST, ALT, ALB in the last 72 hours.     IMPRESSION:    Patient Active Problem List   Diagnosis    Hyperlipidemia    Diabetes mellitus ([address]    Lymphoma ([address]    DDD (degenerative disc disease)    Chronic bronchitis (HCC)    Psoriasis    CAD (coronary artery disease)    History of non-Hodgkin's lymphoma    GI bleed    Diverticulosis    Primary hypertension    Complete heart block ([address]    Syncope    [employer] DC PPM 9/19/22     Assessment:   Sick sinus syndrome with sinus arrest: stable   -s/p dual chamber pacemaker 9/19/2022   -device check per HPI  Syncope: secondary to above  Sinus tachycardia: stable  Bifascicular block: stable  Cardiomyopathy: unclear etiology, likely ischemic   -EF 40-45% on echo 9/2022  CAD:   -s/p LHC with PCI of LAD and PCI of  of RCA 2018   -follows with Dr. Miley Wang at [facility]  HTN: controlled  HLD  DM  Colonic AVM  Diverticulitis       Plan:   1. Continue Toprol, aspirin, Crestor, and fosinopril    2. Remote device transmissions every three months    3.  Follow up as scheduled on 1/09/2023    WENDIE Arias-CNP  [facility]  [phone]

## 2022-11-01 NOTE — PATIENT INSTRUCTIONS
No changes today, continue your current medications    Remote device transmissions every three months      Follow up as scheduled on 01/09/2023

## 2022-11-15 ENCOUNTER — TELEPHONE (OUTPATIENT)
Dept: CARDIOLOGY CLINIC | Age: 77
End: 2022-11-15

## 2022-11-15 NOTE — TELEPHONE ENCOUNTER
11/15 pts wife called i asking if it was safe for pt to get nuclear stress test w/ pacemaker? ? Asking for medical stafff to return call.

## 2023-01-09 ENCOUNTER — NURSE ONLY (OUTPATIENT)
Dept: CARDIOLOGY CLINIC | Age: 78
End: 2023-01-09
Payer: MEDICARE

## 2023-01-09 ENCOUNTER — OFFICE VISIT (OUTPATIENT)
Dept: CARDIOLOGY CLINIC | Age: 78
End: 2023-01-09
Payer: MEDICARE

## 2023-01-09 VITALS
BODY MASS INDEX: 26.34 KG/M2 | SYSTOLIC BLOOD PRESSURE: 124 MMHG | OXYGEN SATURATION: 96 % | WEIGHT: 184 LBS | DIASTOLIC BLOOD PRESSURE: 62 MMHG | HEART RATE: 76 BPM | HEIGHT: 70 IN

## 2023-01-09 DIAGNOSIS — I44.30 AVB (ATRIOVENTRICULAR BLOCK): ICD-10-CM

## 2023-01-09 DIAGNOSIS — I44.2 COMPLETE HEART BLOCK (HCC): Primary | ICD-10-CM

## 2023-01-09 DIAGNOSIS — R55 SYNCOPE, UNSPECIFIED SYNCOPE TYPE: ICD-10-CM

## 2023-01-09 DIAGNOSIS — I49.5 SICK SINUS SYNDROME (HCC): ICD-10-CM

## 2023-01-09 DIAGNOSIS — Z95.0 PACEMAKER: ICD-10-CM

## 2023-01-09 PROCEDURE — 93280 PM DEVICE PROGR EVAL DUAL: CPT | Performed by: INTERNAL MEDICINE

## 2023-01-09 PROCEDURE — 3074F SYST BP LT 130 MM HG: CPT

## 2023-01-09 PROCEDURE — 3078F DIAST BP <80 MM HG: CPT

## 2023-01-09 PROCEDURE — 99214 OFFICE O/P EST MOD 30 MIN: CPT

## 2023-01-09 PROCEDURE — 1123F ACP DISCUSS/DSCN MKR DOCD: CPT

## 2023-01-09 NOTE — PROGRESS NOTES
[address]   Electrophysiology Outpatient Note              Date:  January 9, 2023  Patient name: Ella Castelan  YOB: 1945    Primary Care physician: Jareth Tavera MD    HISTORY OF PRESENT ILLNESS: The patient is a 65 y.o.  male with a history of SSS with sinus arrest, syncope, ST, bifascicular block, cardiomyopathy, CAD, HTN, HLD, DM, colonic AVM, and diverticulitis. In 9/2022 he was admitted for rectal bleeding. EGD showed AVM's. He had a syncopal event with prolonged sinus arrest. Echo showed an EF of 40-45%. On 9/19/2022, he had a dual chamber pacemaker implanted. He follows with [address] [place] [address] cardiology for a history of CAD with stents. On 9/27/2022 device interrogation showed <1% AP, <1% , no arrhythmias, 15 years left on battery. In 11/2022 stress test showed no ischemia, EF of 53%. Today he is being seen for sick sinus syndrome and sinus arrest. He presents today using a walker with his wife. Since his last visit he has followed up with Dr. Michelle Blancas and had a stress test. He has no new complaints. He uses his walker for balance. He tries to stay active. He recently walked around a flea market for 4-5 hours. He denies chest pain and palpitations. He has some dizziness if he is hurrying and moving too fast. This resolves when he slows down. He reports his shoulders are stiff in the morning if he sleeps on his side. No recent weight gain or edema.      Device check today shows:   Brand: [employer]   Mode: DDDR  Normal function  4% AP  <1%   Arrhythmias: one ATR event that shows FFOS  Battery life 15 years  RA impedance 778 ohms   RV impedance 635 ohms   RA threshold 1.2 V @ 0.4 ms  RV threshold 1.2 V @ 0.4 ms  RA sensitivity 0.4 mV  RV sensitivity 0.6 mV    Past Medical History:   has a past medical history of CAD (coronary artery disease), Cancer ([facility] [address] DDD (degenerative disc disease), Diabetes mellitus ([facility] [address] Herniated disc, Hyperlipidemia, and Hypertension. Past Surgical History:   has a past surgical history that includes Cholecystectomy; Coronary angioplasty with stent; Colonoscopy (8/13/2014); other surgical history (Right, 3/3/2016); and Colonoscopy (N/A, 9/15/2022). Home Medications:    Prior to Admission medications    Medication Sig Start Date End Date Taking? Authorizing Provider   B COMPLEX VITAMINS ER PO Take by mouth daily   Yes Historical Provider, MD   metoprolol succinate (TOPROL XL) 50 MG extended release tablet Take 1 tablet by mouth daily 9/21/22  Yes WENDIE Godinez CNP   insulin glargine (LANTUS) 100 UNIT/ML injection vial Inject 34 Units into the skin nightly 9/16/22  Yes Meagan Frazier MD   metFORMIN (GLUCOPHAGE) 1000 MG tablet Take 1 tablet by mouth 2 times daily (with meals) 9/17/22  Yes Meagan Frazier MD   tamsulosin (FLOMAX) 0.4 MG capsule Take 1 capsule by mouth daily 9/17/22  Yes Meagan Frazier MD   pantoprazole (PROTONIX) 40 MG tablet Take 1 tablet by mouth every morning (before breakfast) 9/16/22  Yes Meagan Frazier MD   alogliptin (NESINA) 25 MG TABS tablet Take 25 mg by mouth daily   Yes Historical Provider, MD   diclofenac sodium (VOLTAREN) 1 % GEL Apply topically 4 times daily as needed for Pain   Yes Historical Provider, MD   empagliflozin (JARDIANCE) 25 MG tablet Take 25 mg by mouth daily   Yes Historical Provider, MD   Multiple Vitamins-Minerals (THERAPEUTIC MULTIVITAMIN-MINERALS) tablet Take 1 tablet by mouth daily   Yes Historical Provider, MD   ascorbic acid (VITAMIN C) 500 MG tablet Take 500 mg by mouth daily   Yes Historical Provider, MD   Coenzyme Q10 (CO Q 10) 10 MG CAPS Take 1 capsule by mouth daily   Yes Historical Provider, MD   magnesium oxide (MAG-OX) 400 MG tablet Take 420 mg by mouth daily   Yes Historical Provider, MD   aspirin 81 MG tablet Take 81 mg by mouth daily. Yes Historical Provider, MD   fosinopril (MONOPRIL) 40 MG tablet Take 40 mg by mouth daily.    Yes Historical Provider, MD nitroGLYCERIN (NITROSTAT) 0.4 MG SL tablet Place 0.4 mg under the tongue every 5 minutes as needed. Yes Historical Provider, MD   rosuvastatin (CRESTOR) 20 MG tablet Take 20 mg by mouth daily   Yes Historical Provider, MD   fish oil-omega-3 fatty acids 1000 MG capsule Take 1 g by mouth daily 2capsule in the morning. One at night   Yes Historical Provider, MD   psyllium (METAMUCIL) 58.12 % PACK packet Take 1 packet by mouth daily (with breakfast)  Patient not taking: Reported on 1/9/2023 9/17/22   Raiza Ruiz MD     Allergies:  Doxycycline    Social History:   reports that he has quit smoking. He has quit using smokeless tobacco. He reports that he does not drink alcohol and does not use drugs. Family History: family history includes Cancer in his father; Heart Disease in his mother; Stroke in his mother. All 14 point review of systems are completed and pertinent positives are mentioned in the history of present illness. Other systems are reviewed and are negative. PHYSICAL EXAM:    Vital signs:    /62   Pulse 76   Ht 5' 10\" (1.778 m)   Wt 184 lb (83.5 kg)   SpO2 96%   BMI 26.40 kg/m²      Constitutional and general appearance: alert, cooperative, no distress, and appears stated age  HEENT: PERRL, no cervical lymphadenopathy. No masses palpable. Normal oral mucosa  Respiratory:  Normal excursion and expansion without use of accessory muscles  Resp auscultation: Normal breath sounds without wheezing, rhonchi, and rales  Cardiovascular:   The apical impulse is not displaced  Heart tones are crisp and normal. regular S1 and S2.  Jugular venous pulsation Normal  The carotid upstroke is normal in amplitude and contour without delay or bruit  Peripheral pulses are symmetrical and full   Abdomen:  No masses or tenderness  Bowel sounds present  Extremities:   No cyanosis or clubbing   No lower extremity edema   Skin: warm and dry  Neurological:  Alert and oriented  Moves all extremities well  No abnormalities of mood, affect, memory, mentation, or behavior are noted    DATA:     Stress test 11/2022:  IMAGING FINDINGS:   LVEDV:   108ml     (Normal is up to 100ml for women and 150ml for men)   LVEF:   53 %      (Normal is at least 62% for women and 53% for men)   TRANSIENT DILATATION RATIO:    1.08      (Normal is up to 1.3 for women and 1.2 for men)   LV WALL MOTION:   Unremarkable   SPECT PERFUSION IMAGES:   Satisfactory activity throughout the left ventricle myocardium at stress and at rest      Echo 9/2022:   Summary   Overall, left ventricular systolic function is mildly depressed with an   estimated ejection fraction of 40-45%. There is hypokinesis of the mid to basal inferior, inferoseptal, and   inferolateral walls. Normal left ventricle size and wall thickness. Grade I diastolic dysfunction with normal filling pressure. Mild mitral regurgitation. Aortic valve appears sclerotic but opens adequately. Unable to estimate pulmonary artery pressure secondary to incomplete TR jet   envelope. The right ventricle is normal in size and function. Echo 2018:    Summary:   The Aortic Valve leaflets appear sclerotic. No hemodynamically significant valvular aortic stenosis. The mitral valve leaflets are mildly calcified in appearance. The diastolic function is impaired and classified as Grade 1   (impaired relaxation). The left ventricular function is low normal.   Overall left ventricular ejection fraction is estimated to be 50-55%. There is borderline global hypokinesis of the left ventricle. All labs and testing reviewed. CARDIOLOGY LABS:   CBC: No results for input(s): WBC, HGB, HCT, PLT in the last 72 hours. BMP: No results for input(s): NA, K, CO2, BUN, CREATININE, LABGLOM, GLUCOSE in the last 72 hours. PT/INR: No results for input(s): PROTIME, INR in the last 72 hours. APTT:No results for input(s): APTT in the last 72 hours.   FASTING LIPID PANEL:No results found for: HDL, LDLDIRECT, LDLCALC, TRIG  LIVER PROFILE:No results for input(s): AST, ALT, ALB in the last 72 hours. IMPRESSION:    Patient Active Problem List   Diagnosis    Hyperlipidemia    Diabetes mellitus ([facility] [address]    Lymphoma ([facility] [address]    DDD (degenerative disc disease)    Chronic bronchitis (HCC)    Psoriasis    CAD (coronary artery disease)    History of non-Hodgkin's lymphoma    GI bleed    Diverticulosis    Primary hypertension    Complete heart block ([facility] [address]    Syncope    [employer] PPM 9/19/22     Assessment:   Sick sinus syndrome with sinus arrest/CHB: stable   -s/p dual chamber pacemaker 9/19/2022   -device check per HPI  Syncope: secondary to above  Sinus tachycardia: stable  Bifascicular block: stable  Cardiomyopathy:    -EF 40-45% on echo 9/2022   -normal stress test with EF of 53% 11/2022  CAD:   -s/p LHC with PCI of LAD and PCI of  of RCA 2018   -follows with Dr. Neetu [facility] at [facility]  HTN: controlled  HLD  DM  Colonic AVM  Diverticulitis   Lymphoma: prior chemotherapy   Former tobacco abuse    Plan:   1. Continue Toprol, aspirin, Crestor, and fosinopril    2. Remote device transmissions every three months    3.  Follow up in 6 months or sooner if needed    WENDIE Younger-CNP  [address]  [phone]

## 2023-01-09 NOTE — PROGRESS NOTES
Patient presents to the device clinic today for a programming evaluation for his pacemaker.   Patient has a history of CHB.   Takes Toprol XL and MagOx.   Last device interrogation was on 9/27.   Since then, 1 ATR event recorded appears to show FFOS. Mode switches noted.      P wave: 2.2 mV  R wave: 5.8 mV    AP 4.0%  <1%    All sensing and pacing parameters are within normal range.   No changes need to be made at this time.     Patient will see VALERIA Lawrence in office today.    Patient education was provided about device functionality, in home monitoring, and any other patient questions and/or concerns were addressed. Patient voices understanding.   Patient will follow up in 3 months in office or remotely.  Please see interrogation for more detail - Paceart report located under the Cardiology tab.

## 2023-01-09 NOTE — PATIENT INSTRUCTIONS
No changes today, continue your current medications    Remote device transmissions every three months      Follow up in 6 months or sooner if needed

## 2023-07-11 ENCOUNTER — NURSE ONLY (OUTPATIENT)
Dept: CARDIOLOGY CLINIC | Age: 78
End: 2023-07-11

## 2023-07-19 NOTE — PROGRESS NOTES
End of 91-day monitoring period 7/11  ATR event shows far field R wave over sensing. Remote transmission received for patient's dual chamber PACEMAKER. Transmission shows normal sensing and pacing function. EP physician will review. See interrogation under the cardiology tab in the [address] field for more details. Will continue to monitor remotely.

## 2023-08-28 ENCOUNTER — HOSPITAL ENCOUNTER (EMERGENCY)
Age: 78
Discharge: HOME | End: 2023-08-28
Attending: STUDENT IN AN ORGANIZED HEALTH CARE EDUCATION/TRAINING PROGRAM
Payer: OTHER GOVERNMENT

## 2023-08-28 ENCOUNTER — APPOINTMENT (OUTPATIENT)
Dept: CT IMAGING | Age: 78
End: 2023-08-28
Payer: OTHER GOVERNMENT

## 2023-08-28 VITALS
TEMPERATURE: 97.9 F | HEIGHT: 70 IN | HEART RATE: 89 BPM | OXYGEN SATURATION: 94 % | DIASTOLIC BLOOD PRESSURE: 46 MMHG | RESPIRATION RATE: 17 BRPM | SYSTOLIC BLOOD PRESSURE: 116 MMHG | BODY MASS INDEX: 26.34 KG/M2 | WEIGHT: 184 LBS

## 2023-08-28 DIAGNOSIS — K52.9 ENTEROCOLITIS: Primary | ICD-10-CM

## 2023-08-28 DIAGNOSIS — R55 NEAR SYNCOPE: ICD-10-CM

## 2023-08-28 LAB
ALBUMIN SERPL-MCNC: 4.4 G/DL (ref 3.4–5)
ALBUMIN/GLOB SERPL: 1.3 {RATIO} (ref 1.1–2.2)
ALP SERPL-CCNC: 120 U/L (ref 40–129)
ALT SERPL-CCNC: 25 U/L (ref 10–40)
ANION GAP SERPL CALCULATED.3IONS-SCNC: 13 MMOL/L (ref 3–16)
AST SERPL-CCNC: 42 U/L (ref 15–37)
BASE EXCESS BLDV CALC-SCNC: 0.1 MMOL/L (ref -3–3)
BASOPHILS # BLD: 0.1 K/UL (ref 0–0.2)
BASOPHILS NFR BLD: 0.8 %
BILIRUB SERPL-MCNC: 0.6 MG/DL (ref 0–1)
BILIRUB UR QL STRIP.AUTO: NEGATIVE
BUN SERPL-MCNC: 16 MG/DL (ref 7–20)
CALCIUM SERPL-MCNC: 9.5 MG/DL (ref 8.3–10.6)
CHLORIDE SERPL-SCNC: 96 MMOL/L (ref 99–110)
CLARITY UR: CLEAR
CO2 BLDV-SCNC: 28 MMOL/L
CO2 SERPL-SCNC: 26 MMOL/L (ref 21–32)
COHGB MFR BLDV: 2.7 % (ref 0–1.5)
COLOR UR: YELLOW
CREAT SERPL-MCNC: 1 MG/DL (ref 0.8–1.3)
DEPRECATED RDW RBC AUTO: 13.4 % (ref 12.4–15.4)
EKG ATRIAL RATE: 68 BPM
EKG DIAGNOSIS: NORMAL
EKG P AXIS: 78 DEGREES
EKG P-R INTERVAL: 264 MS
EKG Q-T INTERVAL: 426 MS
EKG QRS DURATION: 148 MS
EKG QTC CALCULATION (BAZETT): 452 MS
EKG R AXIS: 156 DEGREES
EKG T AXIS: 62 DEGREES
EKG VENTRICULAR RATE: 68 BPM
EOSINOPHIL # BLD: 0.2 K/UL (ref 0–0.6)
EOSINOPHIL NFR BLD: 1.9 %
FLUAV RNA RESP QL NAA+PROBE: NOT DETECTED
FLUBV RNA RESP QL NAA+PROBE: NOT DETECTED
GFR SERPLBLD CREATININE-BSD FMLA CKD-EPI: >60 ML/MIN/{1.73_M2}
GI PATHOGENS PNL STL NAA+PROBE: NORMAL
GLUCOSE SERPL-MCNC: 168 MG/DL (ref 70–99)
GLUCOSE UR STRIP.AUTO-MCNC: >=1000 MG/DL
HCO3 BLDV-SCNC: 26.8 MMOL/L (ref 23–29)
HCT VFR BLD AUTO: 54.8 % (ref 40.5–52.5)
HGB BLD-MCNC: 18.9 G/DL (ref 13.5–17.5)
HGB UR QL STRIP.AUTO: NEGATIVE
KETONES UR STRIP.AUTO-MCNC: NEGATIVE MG/DL
LEUKOCYTE ESTERASE UR QL STRIP.AUTO: NEGATIVE
LIPASE SERPL-CCNC: 40 U/L (ref 13–60)
LYMPHOCYTES # BLD: 0.4 K/UL (ref 1–5.1)
LYMPHOCYTES NFR BLD: 4.1 %
MCH RBC QN AUTO: 33 PG (ref 26–34)
MCHC RBC AUTO-ENTMCNC: 34.6 G/DL (ref 31–36)
MCV RBC AUTO: 95.3 FL (ref 80–100)
METHGB MFR BLDV: 0.1 %
MONOCYTES # BLD: 0.4 K/UL (ref 0–1.3)
MONOCYTES NFR BLD: 4 %
NEUTROPHILS # BLD: 8.8 K/UL (ref 1.7–7.7)
NEUTROPHILS NFR BLD: 89.2 %
NITRITE UR QL STRIP.AUTO: NEGATIVE
O2 THERAPY: ABNORMAL
PCO2 BLDV: 49.7 MMHG (ref 40–50)
PH BLDV: 7.35 [PH] (ref 7.35–7.45)
PH UR STRIP.AUTO: 6.5 [PH] (ref 5–8)
PLATELET # BLD AUTO: 165 K/UL (ref 135–450)
PMV BLD AUTO: 8.2 FL (ref 5–10.5)
PO2 BLDV: 32.5 MMHG (ref 25–40)
POTASSIUM SERPL-SCNC: 4.6 MMOL/L (ref 3.5–5.1)
PROT SERPL-MCNC: 7.8 G/DL (ref 6.4–8.2)
PROT UR STRIP.AUTO-MCNC: NEGATIVE MG/DL
RBC # BLD AUTO: 5.75 M/UL (ref 4.2–5.9)
SAO2 % BLDV: 61 %
SARS-COV-2 RNA RESP QL NAA+PROBE: NOT DETECTED
SODIUM SERPL-SCNC: 135 MMOL/L (ref 136–145)
SP GR UR STRIP.AUTO: 1.01 (ref 1–1.03)
UA COMPLETE W REFLEX CULTURE PNL UR: ABNORMAL
UA DIPSTICK W REFLEX MICRO PNL UR: ABNORMAL
URN SPEC COLLECT METH UR: ABNORMAL
UROBILINOGEN UR STRIP-ACNC: 0.2 E.U./DL
WBC # BLD AUTO: 9.9 K/UL (ref 4–11)

## 2023-08-28 PROCEDURE — 85025 COMPLETE CBC W/AUTO DIFF WBC: CPT

## 2023-08-28 PROCEDURE — 99285 EMERGENCY DEPT VISIT HI MDM: CPT

## 2023-08-28 PROCEDURE — 87506 IADNA-DNA/RNA PROBE TQ 6-11: CPT

## 2023-08-28 PROCEDURE — 2580000003 HC RX 258: Performed by: STUDENT IN AN ORGANIZED HEALTH CARE EDUCATION/TRAINING PROGRAM

## 2023-08-28 PROCEDURE — 93010 ELECTROCARDIOGRAM REPORT: CPT | Performed by: INTERNAL MEDICINE

## 2023-08-28 PROCEDURE — 6360000002 HC RX W HCPCS: Performed by: STUDENT IN AN ORGANIZED HEALTH CARE EDUCATION/TRAINING PROGRAM

## 2023-08-28 PROCEDURE — 96372 THER/PROPH/DIAG INJ SC/IM: CPT

## 2023-08-28 PROCEDURE — 6360000004 HC RX CONTRAST MEDICATION: Performed by: STUDENT IN AN ORGANIZED HEALTH CARE EDUCATION/TRAINING PROGRAM

## 2023-08-28 PROCEDURE — 87636 SARSCOV2 & INF A&B AMP PRB: CPT

## 2023-08-28 PROCEDURE — 93005 ELECTROCARDIOGRAM TRACING: CPT | Performed by: STUDENT IN AN ORGANIZED HEALTH CARE EDUCATION/TRAINING PROGRAM

## 2023-08-28 PROCEDURE — 82803 BLOOD GASES ANY COMBINATION: CPT

## 2023-08-28 PROCEDURE — 96374 THER/PROPH/DIAG INJ IV PUSH: CPT

## 2023-08-28 PROCEDURE — 74177 CT ABD & PELVIS W/CONTRAST: CPT

## 2023-08-28 PROCEDURE — 81003 URINALYSIS AUTO W/O SCOPE: CPT

## 2023-08-28 PROCEDURE — 80053 COMPREHEN METABOLIC PANEL: CPT

## 2023-08-28 PROCEDURE — 83690 ASSAY OF LIPASE: CPT

## 2023-08-28 RX ORDER — ONDANSETRON 2 MG/ML
8 INJECTION INTRAMUSCULAR; INTRAVENOUS ONCE
Status: COMPLETED | OUTPATIENT
Start: 2023-08-28 | End: 2023-08-28

## 2023-08-28 RX ORDER — DICYCLOMINE HYDROCHLORIDE 10 MG/ML
20 INJECTION INTRAMUSCULAR ONCE
Status: COMPLETED | OUTPATIENT
Start: 2023-08-28 | End: 2023-08-28

## 2023-08-28 RX ORDER — SODIUM CHLORIDE, SODIUM LACTATE, POTASSIUM CHLORIDE, AND CALCIUM CHLORIDE .6; .31; .03; .02 G/100ML; G/100ML; G/100ML; G/100ML
1000 INJECTION, SOLUTION INTRAVENOUS ONCE
Status: COMPLETED | OUTPATIENT
Start: 2023-08-28 | End: 2023-08-28

## 2023-08-28 RX ORDER — ONDANSETRON 4 MG/1
4 TABLET, ORALLY DISINTEGRATING ORAL 3 TIMES DAILY PRN
Qty: 21 TABLET | Refills: 0 | Status: SHIPPED | OUTPATIENT
Start: 2023-08-28

## 2023-08-28 RX ORDER — CIPROFLOXACIN 500 MG/1
500 TABLET, FILM COATED ORAL 2 TIMES DAILY
Qty: 20 TABLET | Refills: 0 | Status: SHIPPED | OUTPATIENT
Start: 2023-08-28 | End: 2023-09-07

## 2023-08-28 RX ORDER — METRONIDAZOLE 500 MG/1
500 TABLET ORAL 3 TIMES DAILY
Qty: 30 TABLET | Refills: 0 | Status: SHIPPED | OUTPATIENT
Start: 2023-08-28 | End: 2023-09-07

## 2023-08-28 RX ADMIN — SODIUM CHLORIDE, POTASSIUM CHLORIDE, SODIUM LACTATE AND CALCIUM CHLORIDE 1000 ML: 600; 310; 30; 20 INJECTION, SOLUTION INTRAVENOUS at 03:24

## 2023-08-28 RX ADMIN — IOPAMIDOL 75 ML: 755 INJECTION, SOLUTION INTRAVENOUS at 04:30

## 2023-08-28 RX ADMIN — DICYCLOMINE HYDROCHLORIDE 20 MG: 20 INJECTION, SOLUTION INTRAMUSCULAR at 03:21

## 2023-08-28 RX ADMIN — ONDANSETRON 8 MG: 2 INJECTION INTRAMUSCULAR; INTRAVENOUS at 03:21

## 2023-08-28 ASSESSMENT — PAIN SCALES - GENERAL
PAINLEVEL_OUTOF10: 8
PAINLEVEL_OUTOF10: 8

## 2023-08-28 NOTE — ED NOTES
Patient identified as a positive fall risk on the ED triage fall screening. Patient placed in fall precautions which includes:  yellow fall risk bracelet on wrist and yellow socks on feet. Patient instructed on importance of not getting out of bed or ambulating without assistance for safety. Pt verbalized understanding.        Ayan Laird RN  08/28/23 [id number]

## 2023-08-28 NOTE — ED NOTES
Pt was able to walk around room with a steady gait using home walker     Pierre Dunaway RN  08/28/23 [id number]

## 2023-08-28 NOTE — ED NOTES
Shira called for patient and patient taken to front to wait. All belongings and paperwork went with him.      Capo Keith RN  08/28/23 [id number]

## 2023-09-26 ENCOUNTER — NURSE ONLY (OUTPATIENT)
Dept: CARDIOLOGY CLINIC | Age: 78
End: 2023-09-26

## 2023-09-26 ENCOUNTER — OFFICE VISIT (OUTPATIENT)
Dept: CARDIOLOGY CLINIC | Age: 78
End: 2023-09-26
Payer: MEDICARE

## 2023-09-26 VITALS
WEIGHT: 183 LBS | HEART RATE: 74 BPM | SYSTOLIC BLOOD PRESSURE: 126 MMHG | HEIGHT: 70 IN | OXYGEN SATURATION: 95 % | DIASTOLIC BLOOD PRESSURE: 68 MMHG | BODY MASS INDEX: 26.2 KG/M2

## 2023-09-26 DIAGNOSIS — Z95.0 PACEMAKER: Primary | ICD-10-CM

## 2023-09-26 DIAGNOSIS — I44.2 COMPLETE HEART BLOCK (HCC): ICD-10-CM

## 2023-09-26 DIAGNOSIS — Z95.0 PACEMAKER: ICD-10-CM

## 2023-09-26 DIAGNOSIS — I44.2 COMPLETE HEART BLOCK (HCC): Primary | ICD-10-CM

## 2023-09-26 PROCEDURE — 3074F SYST BP LT 130 MM HG: CPT | Performed by: INTERNAL MEDICINE

## 2023-09-26 PROCEDURE — 1123F ACP DISCUSS/DSCN MKR DOCD: CPT | Performed by: INTERNAL MEDICINE

## 2023-09-26 PROCEDURE — 99214 OFFICE O/P EST MOD 30 MIN: CPT | Performed by: INTERNAL MEDICINE

## 2023-09-26 PROCEDURE — 3078F DIAST BP <80 MM HG: CPT | Performed by: INTERNAL MEDICINE

## 2023-09-26 NOTE — PATIENT INSTRUCTIONS
RECOMMENDATIONS:  Remote device transmissions every 3 months. Continue current medications. Follow up one year with EP NP.

## 2023-10-20 PROCEDURE — 93296 REM INTERROG EVL PM/IDS: CPT | Performed by: INTERNAL MEDICINE

## 2023-10-20 PROCEDURE — 93294 REM INTERROG EVL PM/LDLS PM: CPT | Performed by: INTERNAL MEDICINE

## 2024-01-19 PROCEDURE — 93294 REM INTERROG EVL PM/LDLS PM: CPT | Performed by: INTERNAL MEDICINE

## 2024-01-19 PROCEDURE — 93296 REM INTERROG EVL PM/IDS: CPT | Performed by: INTERNAL MEDICINE

## 2024-01-23 ENCOUNTER — HOSPITAL ENCOUNTER (EMERGENCY)
Age: 79
Discharge: HOME | End: 2024-01-24
Attending: EMERGENCY MEDICINE
Payer: OTHER GOVERNMENT

## 2024-01-23 ENCOUNTER — APPOINTMENT (OUTPATIENT)
Dept: CT IMAGING | Age: 79
End: 2024-01-23
Payer: OTHER GOVERNMENT

## 2024-01-23 DIAGNOSIS — R10.9 ABDOMINAL CRAMPING: Primary | ICD-10-CM

## 2024-01-23 DIAGNOSIS — R19.7 DIARRHEA, UNSPECIFIED TYPE: ICD-10-CM

## 2024-01-23 LAB
ALBUMIN SERPL-MCNC: 4.2 G/DL (ref 3.4–5)
ALBUMIN/GLOB SERPL: 1.6 {RATIO} (ref 1.1–2.2)
ALP SERPL-CCNC: 106 U/L (ref 40–129)
ALT SERPL-CCNC: 23 U/L (ref 10–40)
ANION GAP SERPL CALCULATED.3IONS-SCNC: 10 MMOL/L (ref 3–16)
AST SERPL-CCNC: 25 U/L (ref 15–37)
BASE EXCESS BLDV CALC-SCNC: -1.3 MMOL/L (ref -3–3)
BASOPHILS # BLD: 0.1 K/UL (ref 0–0.2)
BASOPHILS NFR BLD: 0.5 %
BILIRUB SERPL-MCNC: 0.8 MG/DL (ref 0–1)
BUN SERPL-MCNC: 17 MG/DL (ref 7–20)
CALCIUM SERPL-MCNC: 9.3 MG/DL (ref 8.3–10.6)
CHLORIDE SERPL-SCNC: 95 MMOL/L (ref 99–110)
CO2 BLDV-SCNC: 27 MMOL/L
CO2 SERPL-SCNC: 28 MMOL/L (ref 21–32)
COHGB MFR BLDV: 4 % (ref 0–1.5)
CREAT SERPL-MCNC: 1.2 MG/DL (ref 0.8–1.3)
DEPRECATED RDW RBC AUTO: 13.3 % (ref 12.4–15.4)
EOSINOPHIL # BLD: 0.1 K/UL (ref 0–0.6)
EOSINOPHIL NFR BLD: 1 %
GFR SERPLBLD CREATININE-BSD FMLA CKD-EPI: >60 ML/MIN/{1.73_M2}
GLUCOSE SERPL-MCNC: 240 MG/DL (ref 70–99)
HCO3 BLDV-SCNC: 25.4 MMOL/L (ref 23–29)
HCT VFR BLD AUTO: 51.6 % (ref 40.5–52.5)
HGB BLD-MCNC: 17.3 G/DL (ref 13.5–17.5)
LACTATE BLDV-SCNC: 2.1 MMOL/L (ref 0.4–1.9)
LACTATE BLDV-SCNC: 2.1 MMOL/L (ref 0.4–1.9)
LIPASE SERPL-CCNC: 32 U/L (ref 13–60)
LYMPHOCYTES # BLD: 0.4 K/UL (ref 1–5.1)
LYMPHOCYTES NFR BLD: 3.2 %
MCH RBC QN AUTO: 32.3 PG (ref 26–34)
MCHC RBC AUTO-ENTMCNC: 33.5 G/DL (ref 31–36)
MCV RBC AUTO: 96.4 FL (ref 80–100)
METHGB MFR BLDV: 0.3 %
MONOCYTES # BLD: 0.9 K/UL (ref 0–1.3)
MONOCYTES NFR BLD: 7.8 %
NEUTROPHILS # BLD: 10 K/UL (ref 1.7–7.7)
NEUTROPHILS NFR BLD: 87.5 %
O2 CT VFR BLDV CALC: 12 VOL %
O2 THERAPY: ABNORMAL
PCO2 BLDV: 49.1 MMHG (ref 40–50)
PH BLDV: 7.33 [PH] (ref 7.35–7.45)
PLATELET # BLD AUTO: 133 K/UL (ref 135–450)
PMV BLD AUTO: 8.1 FL (ref 5–10.5)
PO2 BLDV: 27.3 MMHG (ref 25–40)
POTASSIUM SERPL-SCNC: 5.1 MMOL/L (ref 3.5–5.1)
PROT SERPL-MCNC: 6.8 G/DL (ref 6.4–8.2)
RBC # BLD AUTO: 5.35 M/UL (ref 4.2–5.9)
SAO2 % BLDV: 46 %
SODIUM SERPL-SCNC: 133 MMOL/L (ref 136–145)
WBC # BLD AUTO: 11.4 K/UL (ref 4–11)

## 2024-01-23 PROCEDURE — 6360000002 HC RX W HCPCS: Performed by: EMERGENCY MEDICINE

## 2024-01-23 PROCEDURE — 6360000004 HC RX CONTRAST MEDICATION: Performed by: EMERGENCY MEDICINE

## 2024-01-23 PROCEDURE — 2580000003 HC RX 258: Performed by: EMERGENCY MEDICINE

## 2024-01-23 PROCEDURE — 83690 ASSAY OF LIPASE: CPT

## 2024-01-23 PROCEDURE — 74177 CT ABD & PELVIS W/CONTRAST: CPT

## 2024-01-23 PROCEDURE — 85025 COMPLETE CBC W/AUTO DIFF WBC: CPT

## 2024-01-23 PROCEDURE — 96374 THER/PROPH/DIAG INJ IV PUSH: CPT

## 2024-01-23 PROCEDURE — 82803 BLOOD GASES ANY COMBINATION: CPT

## 2024-01-23 PROCEDURE — 80053 COMPREHEN METABOLIC PANEL: CPT

## 2024-01-23 PROCEDURE — 96372 THER/PROPH/DIAG INJ SC/IM: CPT

## 2024-01-23 PROCEDURE — 36415 COLL VENOUS BLD VENIPUNCTURE: CPT

## 2024-01-23 PROCEDURE — 96361 HYDRATE IV INFUSION ADD-ON: CPT

## 2024-01-23 PROCEDURE — 83605 ASSAY OF LACTIC ACID: CPT

## 2024-01-23 PROCEDURE — 99285 EMERGENCY DEPT VISIT HI MDM: CPT

## 2024-01-23 RX ORDER — DICYCLOMINE HYDROCHLORIDE 10 MG/ML
20 INJECTION INTRAMUSCULAR ONCE
Status: COMPLETED | OUTPATIENT
Start: 2024-01-23 | End: 2024-01-23

## 2024-01-23 RX ORDER — 0.9 % SODIUM CHLORIDE 0.9 %
500 INTRAVENOUS SOLUTION INTRAVENOUS ONCE
Status: COMPLETED | OUTPATIENT
Start: 2024-01-23 | End: 2024-01-23

## 2024-01-23 RX ORDER — ONDANSETRON 2 MG/ML
4 INJECTION INTRAMUSCULAR; INTRAVENOUS EVERY 6 HOURS PRN
Status: DISCONTINUED | OUTPATIENT
Start: 2024-01-23 | End: 2024-01-24 | Stop reason: HOSPADM

## 2024-01-23 RX ADMIN — ONDANSETRON 4 MG: 2 INJECTION INTRAMUSCULAR; INTRAVENOUS at 22:37

## 2024-01-23 RX ADMIN — DICYCLOMINE HYDROCHLORIDE 20 MG: 10 INJECTION, SOLUTION INTRAMUSCULAR at 22:37

## 2024-01-23 RX ADMIN — IOPAMIDOL 75 ML: 755 INJECTION, SOLUTION INTRAVENOUS at 22:49

## 2024-01-23 RX ADMIN — SODIUM CHLORIDE 500 ML: 9 INJECTION, SOLUTION INTRAVENOUS at 22:36

## 2024-01-23 ASSESSMENT — PAIN SCALES - GENERAL: PAINLEVEL_OUTOF10: 7

## 2024-01-23 ASSESSMENT — LIFESTYLE VARIABLES
HOW OFTEN DO YOU HAVE A DRINK CONTAINING ALCOHOL: NEVER
HOW MANY STANDARD DRINKS CONTAINING ALCOHOL DO YOU HAVE ON A TYPICAL DAY: PATIENT DOES NOT DRINK

## 2024-01-24 VITALS
HEIGHT: 69 IN | BODY MASS INDEX: 22.22 KG/M2 | TEMPERATURE: 97.8 F | OXYGEN SATURATION: 97 % | RESPIRATION RATE: 20 BRPM | DIASTOLIC BLOOD PRESSURE: 100 MMHG | WEIGHT: 150 LBS | HEART RATE: 89 BPM | SYSTOLIC BLOOD PRESSURE: 136 MMHG

## 2024-01-24 ASSESSMENT — ENCOUNTER SYMPTOMS
NAUSEA: 0
COUGH: 0
SHORTNESS OF BREATH: 0
DIARRHEA: 1
VOMITING: 0

## 2024-01-24 ASSESSMENT — PAIN - FUNCTIONAL ASSESSMENT: PAIN_FUNCTIONAL_ASSESSMENT: NONE - DENIES PAIN

## 2024-01-24 NOTE — ED PROVIDER NOTES
Emergency Department Attending Physician Note  Location: [facility] ED  1/23/2024       Pt Name: Guille Ribera  MRN: [id number]  Birthdate 1945    Date of evaluation: 1/23/2024  Provider: BELEN MARTINI DO  PCP: Vivian Garces MD    Note Started: 9:51 PM EST 1/23/24    CHIEF COMPLAINT  Chief Complaint   Patient presents with    Abdominal Pain     Pt finished eating and started with abd pain and diarrhea.        ROOM: 11    HISTORY OF PRESENT ILLNESS:  History obtained by patient. Limitations to history : None.     Guille Ribera is a 78 y.o. male with a significant PMHx of CAD, hypertension, diabetes, hyperlipidemia, other comorbidities as listed below, presenting emergency department today with concerns of diarrhea, and abdominal cramping that started after he ate something.  He says this happened exactly the same in the past, and his wife gave him Zofran and Bentyl prior to arrival, \"something for nausea and spasms,\" and he feels much better on arrival to the ER.  No actual nausea or vomiting now.  No lightheadedness or dizziness.  Says his blood pressure is always slightly low, when he arrives emergency department today at 119/60.  Denies any falls or injuries.  No suspicious food intake.  No back pain, lightheadedness, dizziness.  He shows me the left lower quadrant abdominal pain when he had the pain prior to arrival.  Otherwise, says he felt completely fine before dinner.  No blood in his bowel movements recently.      Nursing Notes were all reviewed and agreed with or any disagreements were addressed in the HPI.      MEDICAL HISTORY  Past Medical History:   Diagnosis Date    CAD (coronary artery disease) 3/15/2013    Cancer (HCC)     DDD (degenerative disc disease) 9/25/2012    Diabetes mellitus (HCC)     Herniated disc     Hyperlipidemia 9/25/2012    Hypertension         SURGICAL HISTORY  Past Surgical History:   Procedure Laterality Date    CHOLECYSTECTOMY      COLONOSCOPY  8/13/2014

## 2024-01-30 ENCOUNTER — HOSPITAL ENCOUNTER (OUTPATIENT)
Age: 79
Discharge: HOME | End: 2024-01-30
Payer: OTHER GOVERNMENT

## 2024-01-30 LAB — C DIFF TOX A+B STL QL IA: NORMAL

## 2024-01-30 PROCEDURE — 87506 IADNA-DNA/RNA PROBE TQ 6-11: CPT

## 2024-01-30 PROCEDURE — 87449 NOS EACH ORGANISM AG IA: CPT

## 2024-01-30 PROCEDURE — 82653 EL-1 FECAL QUANTITATIVE: CPT

## 2024-01-30 PROCEDURE — 83993 ASSAY FOR CALPROTECTIN FECAL: CPT

## 2024-01-30 PROCEDURE — 82705 FATS/LIPIDS FECES QUAL: CPT

## 2024-01-30 PROCEDURE — 87324 CLOSTRIDIUM AG IA: CPT

## 2024-01-31 LAB — GI PATHOGENS PNL STL NAA+PROBE: NORMAL

## 2024-02-01 LAB
CALPROTECTIN STL-MCNT: 76 UG/G
FAT STL QL: NORMAL
NEUTRAL FAT STL QL: NORMAL

## 2024-02-03 LAB — ELASTASE PANC STL-MCNT: 260 UG/G

## 2024-02-21 ENCOUNTER — HOSPITAL ENCOUNTER (OUTPATIENT)
Age: 79
Discharge: HOME | End: 2024-02-21
Payer: MEDICARE

## 2024-02-21 ENCOUNTER — HOSPITAL ENCOUNTER (OUTPATIENT)
Dept: GENERAL RADIOLOGY | Age: 79
Discharge: HOME | End: 2024-02-21
Payer: MEDICARE

## 2024-02-21 DIAGNOSIS — R14.0 ABDOMINAL DISTENTION: ICD-10-CM

## 2024-02-21 PROCEDURE — 74018 RADEX ABDOMEN 1 VIEW: CPT

## 2024-07-11 NOTE — ED PROVIDER NOTES
[address] [place] [address]  ED  EMERGENCY DEPARTMENT ENCOUNTER        Pt Name: Shikha Bradley  MRN: [id number]  [facility] 1945  Date of evaluation: 8/28/2023  Provider: Tima Thrasher MD  PCP: Shahla Bain MD  Note Started: 6:34 AM EDT 8/28/23    CHIEF COMPLAINT       Chief Complaint   Patient presents with    Diarrhea     Pt is coming from home with a chief complaint of diarrhea. Pt states that his diarrhea started around 11pm when he went to bed. diarrhea    HISTORY OF PRESENT ILLNESS: 1 or more Elements     History from : Patient    Limitations to history : None    Shikha Bradley is a 65 y.o. male who presents with diarrhea, states 16 episodes of watery liquid stool and abdominal pain and cramping diffusely in the lower abdomen, associated NBNB emesis. No fevers. + lightheadedness after one episode of diarrhea, felt as though he could pass out. No chest pain or SOB. Symptoms not otherwise alleviated or exacerbated by other factors. Nursing Notes were all reviewed and agreed with or any disagreements were addressed in the HPI. REVIEW OF SYSTEMS :      Positives and Pertinent negatives as per HPI. ROS otherwise unremarkable. SURGICAL HISTORY     Past Surgical History:   Procedure Laterality Date    CHOLECYSTECTOMY      COLONOSCOPY  8/13/2014    COLONOSCOPY N/A 9/15/2022    COLONOSCOPY CONTROL HEMORRHAGE performed by Madison Flood MD at [facility]      OTHER SURGICAL HISTORY Right 3/3/2016    PORT REMOVAL        CURRENTMEDICATIONS       Previous Medications    ALOGLIPTIN (NESINA) 25 MG TABS TABLET    Take 25 mg by mouth daily    ASCORBIC ACID (VITAMIN C) 500 MG TABLET    Take 500 mg by mouth daily    ASPIRIN 81 MG TABLET    Take 81 mg by mouth daily.     B COMPLEX VITAMINS ER PO    Take by mouth daily    COENZYME Q10 (CO Q 10) 10 MG CAPS    Take 1 capsule by mouth daily    DICLOFENAC SODIUM (VOLTAREN) 1 % GEL    Apply [facility]   Physical Therapy Initial Evaluation     Date: 7/9/2024     Name: Kiki Peralta  Clinic Number: [id number]  Deshawn Tracey, PT   septic shock? No   Exclusion criteria - the patient is NOT to be included for SEP-1 Core Measure due to:  2+ SIRS criteria are not met    Chronic Conditions: Hyperlipidemia, diabetes, hypertension, coronary artery disease, all risk factors for patient's potential underlying conditions. CONSULTS: (Who and What was discussed)  None    Discussion with Other Profesionals : None    Social Determinants : None    Records Reviewed : Outpatient Notes outpt notes for review of comorbidities, h/o CHB with dual chamber pacemaker     CC/HPI Summary, DDx, ED Course, and Reassessment:   42-year-old man who presents with      CMP-CMP was ordered to rule out electrolyte abnormalities, liver dysfunction, kidney dysfunction, electrolyte imbalance, abnormal transaminases, or any other pathology that might be causing the patient's symptoms. CBC-CBC was ordered to rule out anemia, infection, abnormal platelet count, polycythemia, abnormal Red cell pathology, or any other pathology that might be causing the patient's symptoms     GI pathogen panel sent given the profuse watery diarrhea. Patient is not recently been on antibiotics per history or chart review. He has no evidence of myocardial ischemia compared to his baseline EKG, there is no evidence of endorgan dysfunction. Electrolytes okay. Disposition Considerations (tests considered but not done, Shared Decision Making, Pt Expectation of Test or Tx.):   Appropriate for outpatient management gave strict return precautions for any symptomatic worsening, inability to perform ADLs with assistance at baseline at home. I am the Primary Clinician of Record. FINAL IMPRESSION      1. Enterocolitis    2.  Near syncope          DISPOSITION/PLAN     DISPOSITION Decision To Discharge 08/28/2023 05:51:59 AM      PATIENT REFERRED TO:  Patito Craven MD  [address] [place] [address]  [phone]    Schedule an appointment as soon as possible for a visit

## 2024-07-19 PROCEDURE — 93294 REM INTERROG EVL PM/LDLS PM: CPT | Performed by: INTERNAL MEDICINE

## 2024-07-19 PROCEDURE — 93296 REM INTERROG EVL PM/IDS: CPT | Performed by: INTERNAL MEDICINE

## 2024-10-18 PROCEDURE — 93296 REM INTERROG EVL PM/IDS: CPT | Performed by: INTERNAL MEDICINE

## 2024-10-18 PROCEDURE — 93294 REM INTERROG EVL PM/LDLS PM: CPT | Performed by: INTERNAL MEDICINE

## 2024-11-06 NOTE — PROGRESS NOTES
[facility]   Electrophysiology Outpatient Note              Date:  November 8, 2024  Patient name: Guille Ribera  YOB: 1945    Primary Care physician: Vivian Garces MD    HISTORY OF PRESENT ILLNESS: The patient is a 79 y.o.  male with a history of sick sinus syndrome, sinus arrest, syncope, [employer] DC PPM, bifascicular block, cardiomyopathy, CAD/MASSIMO, hypertension, hyperlipidemia, diabetes, colonic AVM, and diverticulitis    Patient follows with Dr. Corbin in EP clinic.  Patient was admitted to [facility] 9/2022 for rectal bleeding.  EGD revealed AVMs.  Patient had a syncopal episode with prolonged sinus arrest.  Echo demonstrated EF 40-45%.  9/19/2022 patient underwent dual-chamber PPM implantation.  Of note patient has history of CAD with stents and follows at [facility]    Today he is being seen for history of sick sinus syndrome and device management.  Device check reveals AP 6%.  No arrhythmias noted.  14 years remaining on battery..ECG shows SR first-degree AV block with a HR of 69. Patient is accompanied by his wife today.  Denies chest pain shortness of breath and palpitations.  Patient states he is doing well he is in good spirits and his wife says he is full of energy.  He uses a walker with wheels when ambulating    Past Medical History:   has a past medical history of CAD (coronary artery disease), Cancer (HCC), DDD (degenerative disc disease), Diabetes mellitus (HCC), Herniated disc, Hyperlipidemia, and Hypertension.    Past Surgical History:   has a past surgical history that includes Cholecystectomy; Coronary angioplasty with stent; Colonoscopy (8/13/2014); other surgical history (Right, 3/3/2016); and Colonoscopy (N/A, 9/15/2022).     Home Medications:    Prior to Admission medications    Medication Sig Start Date End Date Taking? Authorizing Provider   betamethasone dipropionate 0.05 % cream Apply topically 2 times daily Apply topically 2 times daily.   Yes

## 2024-11-08 ENCOUNTER — OFFICE VISIT (OUTPATIENT)
Dept: CARDIOLOGY CLINIC | Age: 79
End: 2024-11-08

## 2024-11-08 ENCOUNTER — NURSE ONLY (OUTPATIENT)
Dept: CARDIOLOGY CLINIC | Age: 79
End: 2024-11-08

## 2024-11-08 VITALS
DIASTOLIC BLOOD PRESSURE: 68 MMHG | OXYGEN SATURATION: 98 % | HEART RATE: 69 BPM | HEIGHT: 69 IN | BODY MASS INDEX: 27.25 KG/M2 | SYSTOLIC BLOOD PRESSURE: 118 MMHG | WEIGHT: 184 LBS

## 2024-11-08 DIAGNOSIS — Z95.0 PACEMAKER: Primary | ICD-10-CM

## 2024-11-08 DIAGNOSIS — I44.2 COMPLETE HEART BLOCK (HCC): Primary | ICD-10-CM

## 2024-11-08 DIAGNOSIS — I44.2 COMPLETE HEART BLOCK (HCC): ICD-10-CM

## 2024-11-08 RX ORDER — BETAMETHASONE DIPROPIONATE 0.5 MG/G
CREAM TOPICAL 2 TIMES DAILY
COMMUNITY

## 2024-11-08 NOTE — PATIENT INSTRUCTIONS
Continue Toprol XL 50 mg daily  Continue Magnesium oxide 400 mg daily  Continue Monopril 40 mg daily  Continue Crestor 20 mg daily  Continue with remote device checks every 3 months    Follow up in 1 year Alyssa LAWRENCE

## 2025-05-23 ENCOUNTER — APPOINTMENT (OUTPATIENT)
Dept: CT IMAGING | Age: 80
DRG: 193 | End: 2025-05-23
Payer: OTHER GOVERNMENT

## 2025-05-23 ENCOUNTER — APPOINTMENT (OUTPATIENT)
Dept: GENERAL RADIOLOGY | Age: 80
DRG: 193 | End: 2025-05-23
Payer: OTHER GOVERNMENT

## 2025-05-23 ENCOUNTER — HOSPITAL ENCOUNTER (INPATIENT)
Age: 80
LOS: 2 days | Discharge: HOME | DRG: 193 | End: 2025-05-25
Attending: EMERGENCY MEDICINE | Admitting: INTERNAL MEDICINE
Payer: OTHER GOVERNMENT

## 2025-05-23 DIAGNOSIS — J18.9 PNEUMONIA DUE TO INFECTIOUS ORGANISM, UNSPECIFIED LATERALITY, UNSPECIFIED PART OF LUNG: Primary | ICD-10-CM

## 2025-05-23 DIAGNOSIS — J96.01 ACUTE RESPIRATORY FAILURE WITH HYPOXIA (HCC): ICD-10-CM

## 2025-05-23 DIAGNOSIS — R65.10 SIRS (SYSTEMIC INFLAMMATORY RESPONSE SYNDROME) (HCC): ICD-10-CM

## 2025-05-23 LAB
ALBUMIN SERPL-MCNC: 3.6 G/DL (ref 3.4–5)
ALBUMIN/GLOB SERPL: 1.3 {RATIO} (ref 1.1–2.2)
ALP SERPL-CCNC: 107 U/L (ref 40–129)
ALT SERPL-CCNC: 16 U/L (ref 10–40)
ANION GAP SERPL CALCULATED.3IONS-SCNC: 14 MMOL/L (ref 3–16)
AST SERPL-CCNC: 21 U/L (ref 15–37)
BASE EXCESS BLDV CALC-SCNC: -3.1 MMOL/L (ref -3–3)
BASOPHILS # BLD: 0 K/UL (ref 0–0.2)
BASOPHILS NFR BLD: 0.4 %
BILIRUB SERPL-MCNC: 0.7 MG/DL (ref 0–1)
BUN SERPL-MCNC: 16 MG/DL (ref 7–20)
CALCIUM SERPL-MCNC: 8.5 MG/DL (ref 8.3–10.6)
CHLORIDE SERPL-SCNC: 101 MMOL/L (ref 99–110)
CO2 BLDV-SCNC: 22 MMOL/L
CO2 SERPL-SCNC: 22 MMOL/L (ref 21–32)
COHGB MFR BLDV: 1.4 % (ref 0–1.5)
CREAT SERPL-MCNC: 1 MG/DL (ref 0.8–1.3)
D-DIMER QUANTITATIVE: 2.65 UG/ML FEU (ref 0–0.6)
DEPRECATED RDW RBC AUTO: 13.5 % (ref 12.4–15.4)
EKG ATRIAL RATE: 98 BPM
EKG DIAGNOSIS: NORMAL
EKG P-R INTERVAL: 232 MS
EKG Q-T INTERVAL: 376 MS
EKG QRS DURATION: 160 MS
EKG QTC CALCULATION (BAZETT): 480 MS
EKG R AXIS: 157 DEGREES
EKG T AXIS: 44 DEGREES
EKG VENTRICULAR RATE: 98 BPM
EOSINOPHIL # BLD: 0.1 K/UL (ref 0–0.6)
EOSINOPHIL NFR BLD: 0.9 %
FLUAV RNA RESP QL NAA+PROBE: NOT DETECTED
FLUBV RNA RESP QL NAA+PROBE: NOT DETECTED
GFR SERPLBLD CREATININE-BSD FMLA CKD-EPI: 76 ML/MIN/{1.73_M2}
GLUCOSE BLD-MCNC: 174 MG/DL (ref 70–99)
GLUCOSE BLD-MCNC: 231 MG/DL (ref 70–99)
GLUCOSE BLD-MCNC: 241 MG/DL (ref 70–99)
GLUCOSE SERPL-MCNC: 183 MG/DL (ref 70–99)
HCO3 BLDV-SCNC: 21.3 MMOL/L (ref 23–29)
HCT VFR BLD AUTO: 46.8 % (ref 40.5–52.5)
HGB BLD-MCNC: 16.2 G/DL (ref 13.5–17.5)
LACTATE BLDV-SCNC: 1.4 MMOL/L (ref 0.4–1.9)
LYMPHOCYTES # BLD: 0.4 K/UL (ref 1–5.1)
LYMPHOCYTES NFR BLD: 3.9 %
MAGNESIUM SERPL-MCNC: 2.07 MG/DL (ref 1.8–2.4)
MCH RBC QN AUTO: 32.3 PG (ref 26–34)
MCHC RBC AUTO-ENTMCNC: 34.5 G/DL (ref 31–36)
MCV RBC AUTO: 93.6 FL (ref 80–100)
METHGB MFR BLDV: 0 %
MONOCYTES # BLD: 0.5 K/UL (ref 0–1.3)
MONOCYTES NFR BLD: 4.7 %
NEUTROPHILS # BLD: 8.7 K/UL (ref 1.7–7.7)
NEUTROPHILS NFR BLD: 90.1 %
NT-PROBNP SERPL-MCNC: 924 PG/ML (ref 0–449)
O2 CT VFR BLDV CALC: 20 VOL %
O2 THERAPY: ABNORMAL
PCO2 BLDV: 36.3 MMHG (ref 40–50)
PERFORMED ON: ABNORMAL
PH BLDV: 7.39 [PH] (ref 7.35–7.45)
PLATELET # BLD AUTO: 136 K/UL (ref 135–450)
PMV BLD AUTO: 8.3 FL (ref 5–10.5)
PO2 BLDV: 59.2 MMHG (ref 25–40)
POTASSIUM SERPL-SCNC: 4 MMOL/L (ref 3.5–5.1)
PROT SERPL-MCNC: 6.4 G/DL (ref 6.4–8.2)
RBC # BLD AUTO: 5 M/UL (ref 4.2–5.9)
SAO2 % BLDV: 91 %
SARS-COV-2 RNA RESP QL NAA+PROBE: NOT DETECTED
SODIUM SERPL-SCNC: 137 MMOL/L (ref 136–145)
TROPONIN, HIGH SENSITIVITY: 22 NG/L (ref 0–22)
WBC # BLD AUTO: 9.7 K/UL (ref 4–11)

## 2025-05-23 PROCEDURE — 99285 EMERGENCY DEPT VISIT HI MDM: CPT

## 2025-05-23 PROCEDURE — 94761 N-INVAS EAR/PLS OXIMETRY MLT: CPT

## 2025-05-23 PROCEDURE — 87040 BLOOD CULTURE FOR BACTERIA: CPT

## 2025-05-23 PROCEDURE — 93005 ELECTROCARDIOGRAM TRACING: CPT | Performed by: EMERGENCY MEDICINE

## 2025-05-23 PROCEDURE — 36415 COLL VENOUS BLD VENIPUNCTURE: CPT

## 2025-05-23 PROCEDURE — 2500000003 HC RX 250 WO HCPCS: Performed by: NURSE PRACTITIONER

## 2025-05-23 PROCEDURE — 96374 THER/PROPH/DIAG INJ IV PUSH: CPT

## 2025-05-23 PROCEDURE — 2580000003 HC RX 258: Performed by: EMERGENCY MEDICINE

## 2025-05-23 PROCEDURE — 6370000000 HC RX 637 (ALT 250 FOR IP): Performed by: NURSE PRACTITIONER

## 2025-05-23 PROCEDURE — 71260 CT THORAX DX C+: CPT

## 2025-05-23 PROCEDURE — 87636 SARSCOV2 & INF A&B AMP PRB: CPT

## 2025-05-23 PROCEDURE — 92610 EVALUATE SWALLOWING FUNCTION: CPT

## 2025-05-23 PROCEDURE — 82803 BLOOD GASES ANY COMBINATION: CPT

## 2025-05-23 PROCEDURE — 83735 ASSAY OF MAGNESIUM: CPT

## 2025-05-23 PROCEDURE — 6370000000 HC RX 637 (ALT 250 FOR IP): Performed by: EMERGENCY MEDICINE

## 2025-05-23 PROCEDURE — 85025 COMPLETE CBC W/AUTO DIFF WBC: CPT

## 2025-05-23 PROCEDURE — 93010 ELECTROCARDIOGRAM REPORT: CPT | Performed by: INTERNAL MEDICINE

## 2025-05-23 PROCEDURE — 84484 ASSAY OF TROPONIN QUANT: CPT

## 2025-05-23 PROCEDURE — 6360000004 HC RX CONTRAST MEDICATION: Performed by: EMERGENCY MEDICINE

## 2025-05-23 PROCEDURE — 2700000000 HC OXYGEN THERAPY PER DAY

## 2025-05-23 PROCEDURE — 92526 ORAL FUNCTION THERAPY: CPT

## 2025-05-23 PROCEDURE — 80053 COMPREHEN METABOLIC PANEL: CPT

## 2025-05-23 PROCEDURE — 6370000000 HC RX 637 (ALT 250 FOR IP): Performed by: INTERNAL MEDICINE

## 2025-05-23 PROCEDURE — 6360000002 HC RX W HCPCS: Performed by: NURSE PRACTITIONER

## 2025-05-23 PROCEDURE — 83880 ASSAY OF NATRIURETIC PEPTIDE: CPT

## 2025-05-23 PROCEDURE — 2580000003 HC RX 258

## 2025-05-23 PROCEDURE — 6370000000 HC RX 637 (ALT 250 FOR IP)

## 2025-05-23 PROCEDURE — 85379 FIBRIN DEGRADATION QUANT: CPT

## 2025-05-23 PROCEDURE — 6360000002 HC RX W HCPCS: Performed by: EMERGENCY MEDICINE

## 2025-05-23 PROCEDURE — 83605 ASSAY OF LACTIC ACID: CPT

## 2025-05-23 PROCEDURE — 1200000000 HC SEMI PRIVATE

## 2025-05-23 PROCEDURE — 94640 AIRWAY INHALATION TREATMENT: CPT

## 2025-05-23 RX ORDER — POTASSIUM CHLORIDE 7.45 MG/ML
10 INJECTION INTRAVENOUS PRN
Status: DISCONTINUED | OUTPATIENT
Start: 2025-05-23 | End: 2025-05-25 | Stop reason: HOSPADM

## 2025-05-23 RX ORDER — TAMSULOSIN HYDROCHLORIDE 0.4 MG/1
0.4 CAPSULE ORAL DAILY
Status: DISCONTINUED | OUTPATIENT
Start: 2025-05-23 | End: 2025-05-23

## 2025-05-23 RX ORDER — ASPIRIN 81 MG/1
81 TABLET, CHEWABLE ORAL DAILY
Status: DISCONTINUED | OUTPATIENT
Start: 2025-05-23 | End: 2025-05-25 | Stop reason: HOSPADM

## 2025-05-23 RX ORDER — INSULIN GLARGINE 100 [IU]/ML
34 INJECTION, SOLUTION SUBCUTANEOUS NIGHTLY
Status: DISCONTINUED | OUTPATIENT
Start: 2025-05-23 | End: 2025-05-25 | Stop reason: HOSPADM

## 2025-05-23 RX ORDER — ROSUVASTATIN CALCIUM 10 MG/1
20 TABLET, COATED ORAL DAILY
Status: DISCONTINUED | OUTPATIENT
Start: 2025-05-23 | End: 2025-05-25 | Stop reason: HOSPADM

## 2025-05-23 RX ORDER — VALSARTAN 80 MG/1
80 TABLET ORAL DAILY
COMMUNITY

## 2025-05-23 RX ORDER — LANOLIN ALCOHOL/MO/W.PET/CERES
420 CREAM (GRAM) TOPICAL DAILY
Status: DISCONTINUED | OUTPATIENT
Start: 2025-05-23 | End: 2025-05-25 | Stop reason: HOSPADM

## 2025-05-23 RX ORDER — LISINOPRIL 20 MG/1
40 TABLET ORAL DAILY
Status: DISCONTINUED | OUTPATIENT
Start: 2025-05-23 | End: 2025-05-23

## 2025-05-23 RX ORDER — ONDANSETRON 2 MG/ML
4 INJECTION INTRAMUSCULAR; INTRAVENOUS ONCE
Status: COMPLETED | OUTPATIENT
Start: 2025-05-23 | End: 2025-05-23

## 2025-05-23 RX ORDER — SODIUM CHLORIDE 9 MG/ML
INJECTION, SOLUTION INTRAVENOUS CONTINUOUS
Status: ACTIVE | OUTPATIENT
Start: 2025-05-23 | End: 2025-05-23

## 2025-05-23 RX ORDER — POLYETHYLENE GLYCOL 3350 17 G/17G
17 POWDER, FOR SOLUTION ORAL DAILY PRN
Status: DISCONTINUED | OUTPATIENT
Start: 2025-05-23 | End: 2025-05-25 | Stop reason: HOSPADM

## 2025-05-23 RX ORDER — PANTOPRAZOLE SODIUM 40 MG/1
40 TABLET, DELAYED RELEASE ORAL
Status: DISCONTINUED | OUTPATIENT
Start: 2025-05-24 | End: 2025-05-23

## 2025-05-23 RX ORDER — ALBUTEROL SULFATE 90 UG/1
2 INHALANT RESPIRATORY (INHALATION) EVERY 6 HOURS PRN
COMMUNITY

## 2025-05-23 RX ORDER — ACETAMINOPHEN 650 MG/1
650 SUPPOSITORY RECTAL EVERY 6 HOURS PRN
Status: DISCONTINUED | OUTPATIENT
Start: 2025-05-23 | End: 2025-05-25 | Stop reason: HOSPADM

## 2025-05-23 RX ORDER — DEXTROSE MONOHYDRATE 100 MG/ML
INJECTION, SOLUTION INTRAVENOUS CONTINUOUS PRN
Status: DISCONTINUED | OUTPATIENT
Start: 2025-05-23 | End: 2025-05-25 | Stop reason: HOSPADM

## 2025-05-23 RX ORDER — ONDANSETRON 2 MG/ML
4 INJECTION INTRAMUSCULAR; INTRAVENOUS EVERY 6 HOURS PRN
Status: DISCONTINUED | OUTPATIENT
Start: 2025-05-23 | End: 2025-05-25 | Stop reason: HOSPADM

## 2025-05-23 RX ORDER — AZITHROMYCIN 250 MG/1
500 TABLET, FILM COATED ORAL EVERY 24 HOURS
Status: DISCONTINUED | OUTPATIENT
Start: 2025-05-24 | End: 2025-05-25 | Stop reason: HOSPADM

## 2025-05-23 RX ORDER — IPRATROPIUM BROMIDE AND ALBUTEROL SULFATE 2.5; .5 MG/3ML; MG/3ML
1 SOLUTION RESPIRATORY (INHALATION)
Status: DISCONTINUED | OUTPATIENT
Start: 2025-05-23 | End: 2025-05-24

## 2025-05-23 RX ORDER — GUAIFENESIN 600 MG/1
600 TABLET, EXTENDED RELEASE ORAL 2 TIMES DAILY
Status: DISCONTINUED | OUTPATIENT
Start: 2025-05-23 | End: 2025-05-25 | Stop reason: HOSPADM

## 2025-05-23 RX ORDER — GLUCAGON 1 MG/ML
1 KIT INJECTION PRN
Status: DISCONTINUED | OUTPATIENT
Start: 2025-05-23 | End: 2025-05-25 | Stop reason: HOSPADM

## 2025-05-23 RX ORDER — SODIUM CHLORIDE 9 MG/ML
INJECTION, SOLUTION INTRAVENOUS PRN
Status: DISCONTINUED | OUTPATIENT
Start: 2025-05-23 | End: 2025-05-24

## 2025-05-23 RX ORDER — MECOBALAMIN 5000 MCG
5 TABLET,DISINTEGRATING ORAL NIGHTLY
Status: DISCONTINUED | OUTPATIENT
Start: 2025-05-23 | End: 2025-05-24

## 2025-05-23 RX ORDER — ALBUTEROL SULFATE 0.83 MG/ML
2.5 SOLUTION RESPIRATORY (INHALATION)
Status: DISCONTINUED | OUTPATIENT
Start: 2025-05-23 | End: 2025-05-23

## 2025-05-23 RX ORDER — MAGNESIUM SULFATE IN WATER 40 MG/ML
2000 INJECTION, SOLUTION INTRAVENOUS PRN
Status: DISCONTINUED | OUTPATIENT
Start: 2025-05-23 | End: 2025-05-25 | Stop reason: HOSPADM

## 2025-05-23 RX ORDER — POTASSIUM CHLORIDE 1500 MG/1
40 TABLET, EXTENDED RELEASE ORAL PRN
Status: DISCONTINUED | OUTPATIENT
Start: 2025-05-23 | End: 2025-05-25 | Stop reason: HOSPADM

## 2025-05-23 RX ORDER — SODIUM CHLORIDE 0.9 % (FLUSH) 0.9 %
5-40 SYRINGE (ML) INJECTION EVERY 12 HOURS SCHEDULED
Status: DISCONTINUED | OUTPATIENT
Start: 2025-05-23 | End: 2025-05-25 | Stop reason: HOSPADM

## 2025-05-23 RX ORDER — ASCORBIC ACID 500 MG
500 TABLET ORAL DAILY
Status: DISCONTINUED | OUTPATIENT
Start: 2025-05-23 | End: 2025-05-25 | Stop reason: HOSPADM

## 2025-05-23 RX ORDER — IPRATROPIUM BROMIDE AND ALBUTEROL SULFATE 2.5; .5 MG/3ML; MG/3ML
1 SOLUTION RESPIRATORY (INHALATION) ONCE
Status: COMPLETED | OUTPATIENT
Start: 2025-05-23 | End: 2025-05-23

## 2025-05-23 RX ORDER — ACETAMINOPHEN 325 MG/1
650 TABLET ORAL EVERY 6 HOURS PRN
Status: DISCONTINUED | OUTPATIENT
Start: 2025-05-23 | End: 2025-05-25 | Stop reason: HOSPADM

## 2025-05-23 RX ORDER — METOPROLOL SUCCINATE 50 MG/1
50 TABLET, EXTENDED RELEASE ORAL DAILY
Status: DISCONTINUED | OUTPATIENT
Start: 2025-05-23 | End: 2025-05-25 | Stop reason: HOSPADM

## 2025-05-23 RX ORDER — IOPAMIDOL 408 MG/ML
85 INJECTION, SOLUTION INTRATHECAL
Status: COMPLETED | OUTPATIENT
Start: 2025-05-23 | End: 2025-05-23

## 2025-05-23 RX ORDER — ONDANSETRON 4 MG/1
4 TABLET, ORALLY DISINTEGRATING ORAL EVERY 8 HOURS PRN
Status: DISCONTINUED | OUTPATIENT
Start: 2025-05-23 | End: 2025-05-25 | Stop reason: HOSPADM

## 2025-05-23 RX ORDER — ENOXAPARIN SODIUM 100 MG/ML
40 INJECTION SUBCUTANEOUS DAILY
Status: DISCONTINUED | OUTPATIENT
Start: 2025-05-23 | End: 2025-05-23 | Stop reason: SDUPTHER

## 2025-05-23 RX ORDER — INSULIN LISPRO 100 [IU]/ML
0-4 INJECTION, SOLUTION INTRAVENOUS; SUBCUTANEOUS
Status: DISCONTINUED | OUTPATIENT
Start: 2025-05-23 | End: 2025-05-25 | Stop reason: HOSPADM

## 2025-05-23 RX ORDER — ENOXAPARIN SODIUM 100 MG/ML
40 INJECTION SUBCUTANEOUS DAILY
Status: DISCONTINUED | OUTPATIENT
Start: 2025-05-23 | End: 2025-05-25 | Stop reason: HOSPADM

## 2025-05-23 RX ORDER — SITAGLIPTIN 100 MG/1
1 TABLET ORAL DAILY
COMMUNITY

## 2025-05-23 RX ORDER — M-VIT,TX,IRON,MINS/CALC/FOLIC 27MG-0.4MG
1 TABLET ORAL DAILY
Status: DISCONTINUED | OUTPATIENT
Start: 2025-05-23 | End: 2025-05-25 | Stop reason: HOSPADM

## 2025-05-23 RX ORDER — ONDANSETRON 2 MG/ML
4 INJECTION INTRAMUSCULAR; INTRAVENOUS EVERY 6 HOURS PRN
Status: DISCONTINUED | OUTPATIENT
Start: 2025-05-23 | End: 2025-05-23 | Stop reason: SDUPTHER

## 2025-05-23 RX ORDER — SODIUM CHLORIDE 0.9 % (FLUSH) 0.9 %
5-40 SYRINGE (ML) INJECTION PRN
Status: DISCONTINUED | OUTPATIENT
Start: 2025-05-23 | End: 2025-05-25 | Stop reason: HOSPADM

## 2025-05-23 RX ORDER — ONDANSETRON 4 MG/1
4 TABLET, ORALLY DISINTEGRATING ORAL EVERY 8 HOURS PRN
Status: DISCONTINUED | OUTPATIENT
Start: 2025-05-23 | End: 2025-05-23 | Stop reason: SDUPTHER

## 2025-05-23 RX ADMIN — SODIUM CHLORIDE 1000 MG: 9 INJECTION, SOLUTION INTRAVENOUS at 07:33

## 2025-05-23 RX ADMIN — ASPIRIN 81 MG: 81 TABLET, CHEWABLE ORAL at 12:06

## 2025-05-23 RX ADMIN — SODIUM CHLORIDE: 0.9 INJECTION, SOLUTION INTRAVENOUS at 12:09

## 2025-05-23 RX ADMIN — IPRATROPIUM BROMIDE AND ALBUTEROL SULFATE 1 DOSE: 2.5; .5 SOLUTION RESPIRATORY (INHALATION) at 19:38

## 2025-05-23 RX ADMIN — ONDANSETRON 4 MG: 2 INJECTION, SOLUTION INTRAMUSCULAR; INTRAVENOUS at 05:23

## 2025-05-23 RX ADMIN — Medication 5 MG: at 20:33

## 2025-05-23 RX ADMIN — ENOXAPARIN SODIUM 40 MG: 100 INJECTION SUBCUTANEOUS at 12:06

## 2025-05-23 RX ADMIN — Medication 1 TABLET: at 12:06

## 2025-05-23 RX ADMIN — Medication 400 MG: at 12:06

## 2025-05-23 RX ADMIN — INSULIN LISPRO 1 UNITS: 100 INJECTION, SOLUTION INTRAVENOUS; SUBCUTANEOUS at 17:06

## 2025-05-23 RX ADMIN — IOPAMIDOL 85 ML: 408 INJECTION, SOLUTION INTRATHECAL at 06:35

## 2025-05-23 RX ADMIN — GUAIFENESIN 600 MG: 600 TABLET, EXTENDED RELEASE ORAL at 12:06

## 2025-05-23 RX ADMIN — GUAIFENESIN 600 MG: 600 TABLET, EXTENDED RELEASE ORAL at 20:33

## 2025-05-23 RX ADMIN — AZITHROMYCIN MONOHYDRATE 500 MG: 500 INJECTION, POWDER, LYOPHILIZED, FOR SOLUTION INTRAVENOUS at 08:05

## 2025-05-23 RX ADMIN — IPRATROPIUM BROMIDE AND ALBUTEROL SULFATE 1 DOSE: 2.5; .5 SOLUTION RESPIRATORY (INHALATION) at 06:54

## 2025-05-23 RX ADMIN — OXYCODONE HYDROCHLORIDE AND ACETAMINOPHEN 500 MG: 500 TABLET ORAL at 12:06

## 2025-05-23 RX ADMIN — SODIUM CHLORIDE, PRESERVATIVE FREE 10 ML: 5 INJECTION INTRAVENOUS at 20:33

## 2025-05-23 RX ADMIN — INSULIN GLARGINE 34 UNITS: 100 INJECTION, SOLUTION SUBCUTANEOUS at 20:34

## 2025-05-23 RX ADMIN — ROSUVASTATIN CALCIUM 20 MG: 10 TABLET, FILM COATED ORAL at 12:06

## 2025-05-23 RX ADMIN — IPRATROPIUM BROMIDE AND ALBUTEROL SULFATE 1 DOSE: 2.5; .5 SOLUTION RESPIRATORY (INHALATION) at 05:24

## 2025-05-23 RX ADMIN — INSULIN LISPRO 1 UNITS: 100 INJECTION, SOLUTION INTRAVENOUS; SUBCUTANEOUS at 20:34

## 2025-05-23 ASSESSMENT — PAIN SCALES - GENERAL: PAINLEVEL_OUTOF10: 8

## 2025-05-23 ASSESSMENT — PAIN DESCRIPTION - ORIENTATION: ORIENTATION: LOWER;MID

## 2025-05-23 ASSESSMENT — PAIN DESCRIPTION - LOCATION: LOCATION: ABDOMEN

## 2025-05-23 ASSESSMENT — PAIN DESCRIPTION - PAIN TYPE: TYPE: CHRONIC PAIN

## 2025-05-23 ASSESSMENT — PAIN - FUNCTIONAL ASSESSMENT: PAIN_FUNCTIONAL_ASSESSMENT: 0-10

## 2025-05-23 ASSESSMENT — PAIN DESCRIPTION - FREQUENCY: FREQUENCY: CONTINUOUS

## 2025-05-23 ASSESSMENT — PAIN DESCRIPTION - DESCRIPTORS: DESCRIPTORS: CRAMPING

## 2025-05-23 NOTE — PLAN OF CARE
Note--     Received perfect serve message for possible admission . Case discussed with Ed   Admitting orders placed after discussing with the ER physician and as per data provided by ER physician,  patient will be seen and full H&P to follow, per protocol.

## 2025-05-23 NOTE — PROGRESS NOTES
05/23/25 1900   RT Protocol   History Pulmonary Disease 1   Respiratory pattern 0   Breath sounds 2   Cough 0   Indications for Bronchodilator Therapy Decreased or absent breath sounds   Bronchodilator Assessment Score 3     RT Inhaler-Nebulizer Bronchodilator Protocol Note    There is a bronchodilator order in the chart from a provider indicating to follow the RT Bronchodilator Protocol and there is an “Initiate RT Inhaler-Nebulizer Bronchodilator Protocol” order as well (see protocol at bottom of note).    CXR Findings:  No results found.    The findings from the last RT Protocol Assessment were as follows:   History Pulmonary Disease: Smoker 15 pack years or more  Respiratory Pattern: Regular pattern and RR 12-20 bpm  Breath Sounds: Slightly diminished and/or crackles  Cough: Strong, spontaneous, non-productive  Indication for Bronchodilator Therapy: Decreased or absent breath sounds  Bronchodilator Assessment Score: 3    Aerosolized bronchodilator medication orders have been revised according to the RT Inhaler-Nebulizer Bronchodilator Protocol below.    Respiratory Therapist to perform RT Therapy Protocol Assessment initially then follow the protocol.  Repeat RT Therapy Protocol Assessment PRN for score 0-3 or on second treatment, BID, and PRN for scores above 3.    No Indications - adjust the frequency to every 6 hours PRN wheezing or bronchospasm, if no treatments needed after 48 hours then discontinue using Per Protocol order mode.     If indication present, adjust the RT bronchodilator orders based on the Bronchodilator Assessment Score as indicated below.  Use Inhaler orders unless patient has one or more of the following: on home nebulizer, not able to hold breath for 10 seconds, is not alert and oriented, cannot activate and use MDI correctly, or respiratory rate 25 breaths per minute or more, then use the equivalent nebulizer order(s) with same Frequency and PRN reasons based on the score.  If a

## 2025-05-23 NOTE — PROGRESS NOTES
4 Eyes Skin Assessment     NAME:  Guille Ribera  YOB: 1945  MEDICAL RECORD NUMBER:  [id number]    The patient is being assessed for  Admission    I agree that at least one RN has performed a thorough Head to Toe Skin Assessment on the patient. ALL assessment sites listed below have been assessed.      Areas assessed by both nurses:    Scattered bruising and ecchymosis, vascular discoloration BLE. Redness bilat knees. Psoriasis bilat knees and elbows    Head, Face, Ears, Shoulders, Back, Chest, Arms, Elbows, Hands, Sacrum. Buttock, Coccyx, Ischium, Legs. Feet and Heels, and Under Medical Devices         Does the Patient have a Wound? No noted wound(s)       Tim Prevention initiated by RN: No  Wound Care Orders initiated by RN: No    Pressure Injury (Stage 3,4, Unstageable, DTI, NWPT, and Complex wounds) if present, place Wound referral order by RN under : No    New Ostomies, if present place, Ostomy referral order under : No     Nurse 1 eSignature: Electronically signed by Shantel Sheffield RN on 5/23/25 at 1:26 PM EDT    **SHARE this note so that the co-signing nurse can place an eSignature**    Nurse 2 eSignature: Electronically signed by Brooklyn Cho RN on 5/23/25 at 1:28 PM EDT

## 2025-05-23 NOTE — CARE COORDINATION
Case Management Assessment  Initial Evaluation    Date/Time of Evaluation: 5/23/2025 10:42 AM  Assessment Completed by: Hailey Velasquez RN    If patient is discharged prior to next notation, then this note serves as note for discharge by case management.    Patient Name: Guille Ribera                   YOB: 1945  Diagnosis: Pneumonia due to organism [J18.9]  SIRS (systemic inflammatory response syndrome) ([facility]) [R65.10]  Acute respiratory failure with hypoxia ([facility]) [J96.01]  Pneumonia due to infectious organism, unspecified laterality, unspecified part of lung [J18.9]                   Date / Time: 5/23/2025  5:00 AM    Patient Admission Status: Inpatient   Readmission Risk (Low < 19, Mod (19-27), High > 27): Readmission Risk Score: 9.3    Current PCP: Vivian Garces MD  PCP verified by ? Yes    Chart Reviewed: Yes      History Provided by: Patient  Patient Orientation: Alert and Oriented    Patient Cognition: Alert    Hospitalization in the last 30 days (Readmission):  No    If yes, Readmission Assessment in  Navigator will be completed.    Advance Directives:      Code Status: Full Code   Patient's Primary Decision Maker is: Legal Next of Kin    Primary Decision Maker: Caroline Ribera - Spouse - [phone]    Discharge Planning:    Patient lives with: Spouse/Significant Other Type of Home: House  Primary Care Giver: Self  Patient Support Systems include: Spouse/Significant Other, Children   Current Financial resources:  ([facility])  Current community resources: None  Current services prior to admission: Durable Medical Equipment            Current DME: Walker            Type of Home Care services:  None    ADLS  Prior functional level: Mobility, Assistance with the following:  Current functional level: Mobility, Assistance with the following:    PT AM-PAC:   /24  OT AM-PAC:   /24    Family can provide assistance at DC: Yes  Would you like Case Management to discuss the discharge plan with any other

## 2025-05-23 NOTE — PROGRESS NOTES
RT Inhaler-Nebulizer Bronchodilator Protocol Note    There is a bronchodilator order in the chart from a provider indicating to follow the RT Bronchodilator Protocol and there is an “Initiate RT Inhaler-Nebulizer Bronchodilator Protocol” order as well (see protocol at bottom of note).    CXR Findings:  No results found.    The findings from the last RT Protocol Assessment were as follows:   History Pulmonary Disease: (P) Smoker 15 pack years or more  Respiratory Pattern: (P) Regular pattern and RR 12-20 bpm  Breath Sounds: (P) Clear breath sounds  Cough: (P) Strong, spontaneous, non-productive  Indication for Bronchodilator Therapy: (P) None  Bronchodilator Assessment Score: (P) 1    Aerosolized bronchodilator medication orders have been revised according to the RT Inhaler-Nebulizer Bronchodilator Protocol below.    Respiratory Therapist to perform RT Therapy Protocol Assessment initially then follow the protocol.  Repeat RT Therapy Protocol Assessment PRN for score 0-3 or on second treatment, BID, and PRN for scores above 3.    No Indications - adjust the frequency to every 6 hours PRN wheezing or bronchospasm, if no treatments needed after 48 hours then discontinue using Per Protocol order mode.     If indication present, adjust the RT bronchodilator orders based on the Bronchodilator Assessment Score as indicated below.  Use Inhaler orders unless patient has one or more of the following: on home nebulizer, not able to hold breath for 10 seconds, is not alert and oriented, cannot activate and use MDI correctly, or respiratory rate 25 breaths per minute or more, then use the equivalent nebulizer order(s) with same Frequency and PRN reasons based on the score.  If a patient is on this medication at home then do not decrease Frequency below that used at home.    0-3 - enter or revise RT bronchodilator order(s) to equivalent RT Bronchodilator order with Frequency of every 4 hours PRN for wheezing or increased work of

## 2025-05-23 NOTE — H&P
Hospital Medicine History & Physical      PCP: Vivian Garces MD    Date of Admission: 5/23/2025    Date of Service: Pt seen/examined on 5/23/2025     Chief Complaint:    Chief Complaint   Patient presents with    Shortness of Breath     Brought in by EMS with SOB since 4 days, visited urgent care and was sent home, no History of COPD, No asthma, on arrival alert oriented         History Of Present Illness:      The patient is a 79 y.o. male with pmhx of CHF, CAD, DM, HTN, lymphoma PPM who presented to [facility] ED with complaint of shortness of breath and inability to sleep. Patient reports he has not felt well for a couple days. Has been up all night coughing. Cough is productive with dark brown phlegm. No chest pain but has felt short of breath. Also had fever at home but does not remember what his temperature was. Has had chills and body aches. No vomiting or diarrhea. No urinary symptoms.   Former smoker.     Past Medical History:        Diagnosis Date    CAD (coronary artery disease) 3/15/2013    Cancer (HCC)     DDD (degenerative disc disease) 9/25/2012    Diabetes mellitus (HCC)     Herniated disc     Hyperlipidemia 9/25/2012    Hypertension        Past Surgical History:        Procedure Laterality Date    CHOLECYSTECTOMY      COLONOSCOPY  8/13/2014    COLONOSCOPY N/A 9/15/2022    COLONOSCOPY CONTROL HEMORRHAGE performed by Channing Fuentes MD at [facility] ENDOSCOPY    CORONARY ANGIOPLASTY WITH STENT PLACEMENT      OTHER SURGICAL HISTORY Right 3/3/2016    PORT REMOVAL        Medications Prior to Admission:    Prior to Admission medications    Medication Sig Start Date End Date Taking? Authorizing Provider   SITagliptin 100 MG TABS Take 1 tablet by mouth daily   Yes Nadira Lizarraga MD   valsartan (DIOVAN) 80 MG tablet Take 1 tablet by mouth daily   Yes Nadira Lizarraga MD   B COMPLEX VITAMINS ER PO Take by mouth daily   Yes Nadira Lizarraga MD   metoprolol succinate (TOPROL XL)

## 2025-05-23 NOTE — PROGRESS NOTES
Speech Language Pathology  Swallowing Disorders and Dysphagia  Clinical Bedside Swallow Assessment  Facility/Department: [address] MEDICAL-SURGICAL    Instrumentation: Not clinically indicated at this time  Diet recommendation: IDDSI 7 Regular Solids; IDDSI 0 Thin Liquids; Meds PO as tolerated  Risk management: upright for all intake, stay upright for at least 30 mins after intake, small bites/sips, oral care 2-3x/day to reduce adverse affects in the event of aspiration, increase physical mobility as able, slow rate of intake, general aspiration precautions, and hold PO and contact SLP if s/s of aspiration or worsening respiratory status develop.    NAME:Guille Ribera  : 1945 (79 y.o.)   MRN: [id number]  ROOM: [address] [place] [address]  ADMISSION DATE: 2025  Chief Complaint   Patient presents with    Shortness of Breath     Brought in by EMS with SOB since 4 days, visited urgent care and was sent home, no History of COPD, No asthma, on arrival alert oriented     Past Medical History:   Diagnosis Date    CAD (coronary artery disease) 3/15/2013    Cancer (HCC)     DDD (degenerative disc disease) 2012    Diabetes mellitus (HCC)     Herniated disc     Hyperlipidemia 2012    Hypertension      Past Surgical History:   Procedure Laterality Date    CHOLECYSTECTOMY      COLONOSCOPY  2014    COLONOSCOPY N/A 9/15/2022    COLONOSCOPY CONTROL HEMORRHAGE performed by Channing Fuentes MD at [facility] ENDOSCOPY    CORONARY ANGIOPLASTY WITH STENT PLACEMENT      OTHER SURGICAL HISTORY Right 3/3/2016    PORT REMOVAL          DATE ONSET: 2025    Date of Evaluation: 2025   Evaluating Therapist: SHANNON Carr    Chart Reviewed: : [x] Yes [] No     Pain: The patient does not complain of pain    RN Ok'd SLP Entry: Yes [x]  Requested Hold []  RN Name: Shantel      Data Review:        Current Diet: ADULT DIET; Regular; 4 carb choices (60 gm/meal)    Lab Values:    CBC:  Lab Results   Component Value Date

## 2025-05-23 NOTE — ED PROVIDER NOTES
[facility] EMERGENCY DEPARTMENT  EMERGENCY DEPARTMENTENCOUNTER      Pt Name: Guille Ribera  MRN: [id number]  Birthdate 1945  Date ofevaluation: 5/23/2025  Provider: Lashae Raza MD    CHIEF COMPLAINT       Chief Complaint   Patient presents with    Shortness of Breath     Brought in by EMS with SOB since 4 days, visited urgent care and was sent home, no History of COPD, No asthma, on arrival alert oriented         HISTORY OF PRESENT ILLNESS   (Location/Symptom, Timing/Onset,Context/Setting, Quality, Duration, Modifying Factors, Severity)  Note limiting factors.   Guille Ribera is a 79 y.o. male  who  has a past medical history of CAD (coronary artery disease), Cancer (HCC), DDD (degenerative disc disease), Diabetes mellitus (HCC), Herniated disc, Hyperlipidemia, and Hypertension.  who presents to the emergency department complaining of shortness of breath with productive cough of brown sputum for 4 days.  He has had nausea.  Nothing has made his symptoms better or worse.  He states that he felt feverish a couple days ago but did not check his temperature.  He went to urgent care yesterday and states that they sent him home with nothing.  When EMS picked him up his oxygen was in the mid 80s.  They placed him on 2 L and it came up to 95%.  EMS gave him 1 DuoNeb's treatment.  He denies chest pain, leg swelling, recent travel, vomiting, diarrhea.  History obtained from the patient and EMS.  He presents with acute illness.      NursingNotes were reviewed.    REVIEW OF SYSTEMS    (2-9 systems for level 4, 10 or more for level 5)     Review of Systems    Except as noted above the remainder of the review of systems was reviewed and negative.       PAST MEDICAL HISTORY     Past Medical History:   Diagnosis Date    CAD (coronary artery disease) 3/15/2013    Cancer (HCC)     DDD (degenerative disc disease) 9/25/2012    Diabetes mellitus (HCC)     Herniated disc     Hyperlipidemia 9/25/2012    Hypertension

## 2025-05-24 PROBLEM — I50.42 CHRONIC COMBINED SYSTOLIC AND DIASTOLIC HEART FAILURE (HCC): Status: ACTIVE | Noted: 2025-05-24

## 2025-05-24 PROBLEM — J96.01 ACUTE RESPIRATORY FAILURE WITH HYPOXEMIA (HCC): Status: ACTIVE | Noted: 2025-05-24

## 2025-05-24 PROBLEM — J15.9 BACTERIAL PNEUMONIA: Status: ACTIVE | Noted: 2025-05-24

## 2025-05-24 PROBLEM — J96.01 ACUTE RESPIRATORY FAILURE WITH HYPOXEMIA (HCC): Status: RESOLVED | Noted: 2025-05-24 | Resolved: 2025-05-24

## 2025-05-24 LAB
ANION GAP SERPL CALCULATED.3IONS-SCNC: 11 MMOL/L (ref 3–16)
BASOPHILS # BLD: 0 K/UL (ref 0–0.2)
BASOPHILS NFR BLD: 0.4 %
BUN SERPL-MCNC: 19 MG/DL (ref 7–20)
CALCIUM SERPL-MCNC: 8.2 MG/DL (ref 8.3–10.6)
CHLORIDE SERPL-SCNC: 101 MMOL/L (ref 99–110)
CO2 SERPL-SCNC: 23 MMOL/L (ref 21–32)
CREAT SERPL-MCNC: 1.1 MG/DL (ref 0.8–1.3)
DEPRECATED RDW RBC AUTO: 13.4 % (ref 12.4–15.4)
EOSINOPHIL # BLD: 0.1 K/UL (ref 0–0.6)
EOSINOPHIL NFR BLD: 1.5 %
EST. AVERAGE GLUCOSE BLD GHB EST-MCNC: 157.1 MG/DL
GFR SERPLBLD CREATININE-BSD FMLA CKD-EPI: 68 ML/MIN/{1.73_M2}
GLUCOSE BLD-MCNC: 174 MG/DL (ref 70–99)
GLUCOSE BLD-MCNC: 200 MG/DL (ref 70–99)
GLUCOSE BLD-MCNC: 209 MG/DL (ref 70–99)
GLUCOSE BLD-MCNC: 258 MG/DL (ref 70–99)
GLUCOSE BLD-MCNC: 260 MG/DL (ref 70–99)
GLUCOSE SERPL-MCNC: 122 MG/DL (ref 70–99)
HBA1C MFR BLD: 7.1 %
HCT VFR BLD AUTO: 39.1 % (ref 40.5–52.5)
HGB BLD-MCNC: 13.7 G/DL (ref 13.5–17.5)
LYMPHOCYTES # BLD: 0.6 K/UL (ref 1–5.1)
LYMPHOCYTES NFR BLD: 6.5 %
MCH RBC QN AUTO: 32.4 PG (ref 26–34)
MCHC RBC AUTO-ENTMCNC: 35 G/DL (ref 31–36)
MCV RBC AUTO: 92.7 FL (ref 80–100)
MONOCYTES # BLD: 0.8 K/UL (ref 0–1.3)
MONOCYTES NFR BLD: 8.9 %
NEUTROPHILS # BLD: 7.5 K/UL (ref 1.7–7.7)
NEUTROPHILS NFR BLD: 82.7 %
PERFORMED ON: ABNORMAL
PLATELET # BLD AUTO: 126 K/UL (ref 135–450)
PMV BLD AUTO: 8.1 FL (ref 5–10.5)
POTASSIUM SERPL-SCNC: 4.1 MMOL/L (ref 3.5–5.1)
RBC # BLD AUTO: 4.22 M/UL (ref 4.2–5.9)
SODIUM SERPL-SCNC: 135 MMOL/L (ref 136–145)
WBC # BLD AUTO: 9.1 K/UL (ref 4–11)

## 2025-05-24 PROCEDURE — 87205 SMEAR GRAM STAIN: CPT

## 2025-05-24 PROCEDURE — 6370000000 HC RX 637 (ALT 250 FOR IP): Performed by: NURSE PRACTITIONER

## 2025-05-24 PROCEDURE — 94669 MECHANICAL CHEST WALL OSCILL: CPT

## 2025-05-24 PROCEDURE — 80048 BASIC METABOLIC PNL TOTAL CA: CPT

## 2025-05-24 PROCEDURE — 94761 N-INVAS EAR/PLS OXIMETRY MLT: CPT

## 2025-05-24 PROCEDURE — 1200000000 HC SEMI PRIVATE

## 2025-05-24 PROCEDURE — 85025 COMPLETE CBC W/AUTO DIFF WBC: CPT

## 2025-05-24 PROCEDURE — 6370000000 HC RX 637 (ALT 250 FOR IP): Performed by: INTERNAL MEDICINE

## 2025-05-24 PROCEDURE — 87070 CULTURE OTHR SPECIMN AEROBIC: CPT

## 2025-05-24 PROCEDURE — 2580000003 HC RX 258: Performed by: NURSE PRACTITIONER

## 2025-05-24 PROCEDURE — 94640 AIRWAY INHALATION TREATMENT: CPT

## 2025-05-24 PROCEDURE — 2500000003 HC RX 250 WO HCPCS: Performed by: NURSE PRACTITIONER

## 2025-05-24 PROCEDURE — 6370000000 HC RX 637 (ALT 250 FOR IP)

## 2025-05-24 PROCEDURE — 99233 SBSQ HOSP IP/OBS HIGH 50: CPT | Performed by: INTERNAL MEDICINE

## 2025-05-24 PROCEDURE — 36415 COLL VENOUS BLD VENIPUNCTURE: CPT

## 2025-05-24 PROCEDURE — 6360000002 HC RX W HCPCS: Performed by: NURSE PRACTITIONER

## 2025-05-24 PROCEDURE — 2700000000 HC OXYGEN THERAPY PER DAY

## 2025-05-24 PROCEDURE — 83036 HEMOGLOBIN GLYCOSYLATED A1C: CPT

## 2025-05-24 RX ORDER — IPRATROPIUM BROMIDE AND ALBUTEROL SULFATE 2.5; .5 MG/3ML; MG/3ML
1 SOLUTION RESPIRATORY (INHALATION)
Status: DISCONTINUED | OUTPATIENT
Start: 2025-05-24 | End: 2025-05-24

## 2025-05-24 RX ORDER — MECOBALAMIN 5000 MCG
5 TABLET,DISINTEGRATING ORAL NIGHTLY PRN
Status: DISCONTINUED | OUTPATIENT
Start: 2025-05-24 | End: 2025-05-25 | Stop reason: HOSPADM

## 2025-05-24 RX ORDER — IPRATROPIUM BROMIDE AND ALBUTEROL SULFATE 2.5; .5 MG/3ML; MG/3ML
1 SOLUTION RESPIRATORY (INHALATION)
Status: DISCONTINUED | OUTPATIENT
Start: 2025-05-24 | End: 2025-05-25 | Stop reason: HOSPADM

## 2025-05-24 RX ADMIN — INSULIN GLARGINE 34 UNITS: 100 INJECTION, SOLUTION SUBCUTANEOUS at 20:31

## 2025-05-24 RX ADMIN — INSULIN LISPRO 1 UNITS: 100 INJECTION, SOLUTION INTRAVENOUS; SUBCUTANEOUS at 12:06

## 2025-05-24 RX ADMIN — INSULIN LISPRO 2 UNITS: 100 INJECTION, SOLUTION INTRAVENOUS; SUBCUTANEOUS at 08:55

## 2025-05-24 RX ADMIN — SODIUM CHLORIDE 1000 MG: 9 INJECTION, SOLUTION INTRAVENOUS at 05:51

## 2025-05-24 RX ADMIN — GUAIFENESIN 600 MG: 600 TABLET, EXTENDED RELEASE ORAL at 08:55

## 2025-05-24 RX ADMIN — ASPIRIN 81 MG: 81 TABLET, CHEWABLE ORAL at 08:55

## 2025-05-24 RX ADMIN — OXYCODONE HYDROCHLORIDE AND ACETAMINOPHEN 500 MG: 500 TABLET ORAL at 08:55

## 2025-05-24 RX ADMIN — ACETAMINOPHEN 650 MG: 325 TABLET ORAL at 22:10

## 2025-05-24 RX ADMIN — ACETAMINOPHEN 650 MG: 325 TABLET ORAL at 09:50

## 2025-05-24 RX ADMIN — ENOXAPARIN SODIUM 40 MG: 100 INJECTION SUBCUTANEOUS at 08:55

## 2025-05-24 RX ADMIN — GUAIFENESIN 600 MG: 600 TABLET, EXTENDED RELEASE ORAL at 20:31

## 2025-05-24 RX ADMIN — Medication 5 MG: at 20:31

## 2025-05-24 RX ADMIN — SODIUM CHLORIDE, PRESERVATIVE FREE 10 ML: 5 INJECTION INTRAVENOUS at 20:32

## 2025-05-24 RX ADMIN — IPRATROPIUM BROMIDE AND ALBUTEROL SULFATE 1 DOSE: 2.5; .5 SOLUTION RESPIRATORY (INHALATION) at 07:57

## 2025-05-24 RX ADMIN — INSULIN LISPRO 2 UNITS: 100 INJECTION, SOLUTION INTRAVENOUS; SUBCUTANEOUS at 20:31

## 2025-05-24 RX ADMIN — POLYETHYLENE GLYCOL (3350) 17 G: 17 POWDER, FOR SOLUTION ORAL at 20:31

## 2025-05-24 RX ADMIN — IPRATROPIUM BROMIDE AND ALBUTEROL SULFATE 1 DOSE: 2.5; .5 SOLUTION RESPIRATORY (INHALATION) at 19:20

## 2025-05-24 RX ADMIN — AZITHROMYCIN DIHYDRATE 500 MG: 250 TABLET ORAL at 08:55

## 2025-05-24 RX ADMIN — PSYLLIUM HUSK 1 PACKET: 3.4 POWDER ORAL at 08:55

## 2025-05-24 RX ADMIN — Medication 400 MG: at 08:55

## 2025-05-24 RX ADMIN — ROSUVASTATIN CALCIUM 20 MG: 10 TABLET, FILM COATED ORAL at 08:55

## 2025-05-24 RX ADMIN — Medication 1 TABLET: at 08:55

## 2025-05-24 ASSESSMENT — PAIN DESCRIPTION - DESCRIPTORS
DESCRIPTORS: ACHING
DESCRIPTORS: ACHING

## 2025-05-24 ASSESSMENT — PAIN DESCRIPTION - LOCATION
LOCATION: HEAD
LOCATION: HEAD

## 2025-05-24 ASSESSMENT — PAIN SCALES - GENERAL
PAINLEVEL_OUTOF10: 3
PAINLEVEL_OUTOF10: 4
PAINLEVEL_OUTOF10: 0

## 2025-05-24 ASSESSMENT — PAIN - FUNCTIONAL ASSESSMENT
PAIN_FUNCTIONAL_ASSESSMENT: ACTIVITIES ARE NOT PREVENTED
PAIN_FUNCTIONAL_ASSESSMENT: ACTIVITIES ARE NOT PREVENTED

## 2025-05-24 ASSESSMENT — PAIN SCALES - WONG BAKER: WONGBAKER_NUMERICALRESPONSE: NO HURT

## 2025-05-24 ASSESSMENT — PAIN DESCRIPTION - ORIENTATION: ORIENTATION: ANTERIOR

## 2025-05-24 NOTE — PROGRESS NOTES
05/24/25 0800   RT Protocol   History Pulmonary Disease 1   Respiratory pattern 0   Breath sounds 2   Cough 1   Indications for Bronchodilator Therapy Decreased or absent breath sounds   Bronchodilator Assessment Score 4

## 2025-05-24 NOTE — PLAN OF CARE
Problem: Chronic Conditions and Co-morbidities  Goal: Patient's chronic conditions and co-morbidity symptoms are monitored and maintained or improved  5/23/2025 2037 by Brooklyn Rolle RN  Outcome: Progressing  5/23/2025 1228 by Shantel Sheffield RN  Outcome: Progressing     Problem: Discharge Planning  Goal: Discharge to home or other facility with appropriate resources  5/23/2025 2037 by Brooklyn Rolle RN  Outcome: Progressing  5/23/2025 1228 by Shantel Sheffield RN  Outcome: Progressing     Problem: Pain  Goal: Verbalizes/displays adequate comfort level or baseline comfort level  5/23/2025 2037 by Brooklyn Rolle RN  Outcome: Progressing  5/23/2025 1228 by Shantel Sheffield RN  Outcome: Progressing     Problem: Safety - Adult  Goal: Free from fall injury  5/23/2025 2037 by Brooklyn Rolle RN  Outcome: Progressing  5/23/2025 1228 by Shantel Sheffield RN  Outcome: Progressing     Problem: ABCDS Injury Assessment  Goal: Absence of physical injury  5/23/2025 2037 by Brooklyn Rolle RN  Outcome: Progressing  5/23/2025 1228 by Shantel Sheffield RN  Outcome: Progressing

## 2025-05-24 NOTE — PLAN OF CARE
Problem: Chronic Conditions and Co-morbidities  Goal: Patient's chronic conditions and co-morbidity symptoms are monitored and maintained or improved  5/24/2025 0806 by Shantel Sheffield RN  Outcome: Progressing     Problem: Discharge Planning  Goal: Discharge to home or other facility with appropriate resources  5/24/2025 0806 by Shantel Sheffield RN  Outcome: Progressing     Problem: Pain  Goal: Verbalizes/displays adequate comfort level or baseline comfort level  5/24/2025 0806 by Shantel Sheffield RN  Outcome: Progressing     Problem: Safety - Adult  Goal: Free from fall injury  5/24/2025 0806 by Shantel Sheffield RN  Outcome: Progressing     Problem: ABCDS Injury Assessment  Goal: Absence of physical injury  5/24/2025 0806 by Shantel Sheffield RN  Outcome: Progressing

## 2025-05-24 NOTE — PROGRESS NOTES
Progress Note    Date:5/24/2025       Room:0226/0226-01  Patient Name:Guille Ribera     YOB: 1945     Age:79 y.o.    Assessment and Plan         Hospital Problems           Last Modified POA    * (Principal) Pneumonia due to organism 5/23/2025 Yes    Primary hypertension 5/24/2025 Yes    [facility] DC PPM 9/19/22 5/24/2025 Yes    Diabetes mellitus (HCC) 5/24/2025 Yes    Bacterial pneumonia 5/24/2025 Yes     Acute respiratory failure with hypoxemia precipitated by bacterial pneumonia -present on admission -resolved now   Patient presented with oxygen requirement of 2 L by nasal cannula, that has been weaned off after initiation of antibiotics   Currently saturating 94% in room air    Bacterial pneumonia of the left lower lobe -present on admission -community-acquired   Continue current antibiotics with azithromycin and Rocephin IV   Will add low-dose of daily prednisone   Continue incentive spirometer at bedside, I-S at the bedside too   Continue pulmonary toilet and as needed bronchodilators    History of coronary artery disease and ischemic cardiomyopathy   Home dose of aspirin  and statin has been continued    Holding beta-blocker and ACE inhibitor due to mild hypotension present on admission   Last EF was improved   Will discontinue IV fluid to avoid acute decompensation of heart failure   Continue telemetry monitor  Anticipate resuming home medications in the next 24 hours    Type 2 diabetes -unknown control at home, on insulin regimen at home   And high risk of steroid-induced hyperglycemia and also hyperglycemia of acute illness   Continue monitor blood glucose before meals and at bedtime   Continue sliding scale   Continue carb controlled diet    DVT prophylaxis: Lovenox  Diet: ADULT DIET; Regular; 4 carb choices (60 gm/meal)  GI prophylaxis: Not needed  CODE STATUS: Full Code No additional code details    Disposition: Telemetry inpatient    Anticipate discharge home in the next 24 to 48

## 2025-05-24 NOTE — PROGRESS NOTES
Bedside report given and pt care transferred to Ann RN. Pt denies needs at this time. Call light within reach.

## 2025-05-24 NOTE — PROGRESS NOTES
Pt awake assist up in chair for breakfast,water pitcher refilled  coffee give, call light in reach.

## 2025-05-24 NOTE — PROGRESS NOTES
Bedside report given and pt care transferred from Shantel SEAY. Pt denies needs at this time. Call light within reach.

## 2025-05-24 NOTE — PROGRESS NOTES
05/24/25 1900   RT Protocol   History Pulmonary Disease 1   Respiratory pattern 0   Breath sounds 2   Cough 1   Indications for Bronchodilator Therapy Decreased or absent breath sounds   Bronchodilator Assessment Score 4     RT Inhaler-Nebulizer Bronchodilator Protocol Note    There is a bronchodilator order in the chart from a provider indicating to follow the RT Bronchodilator Protocol and there is an “Initiate RT Inhaler-Nebulizer Bronchodilator Protocol” order as well (see protocol at bottom of note).    CXR Findings:  No results found.    The findings from the last RT Protocol Assessment were as follows:   History Pulmonary Disease: Smoker 15 pack years or more  Respiratory Pattern: Regular pattern and RR 12-20 bpm  Breath Sounds: Slightly diminished and/or crackles  Cough: Strong, productive  Indication for Bronchodilator Therapy: Decreased or absent breath sounds  Bronchodilator Assessment Score: 4    Aerosolized bronchodilator medication orders have been revised according to the RT Inhaler-Nebulizer Bronchodilator Protocol below.    Respiratory Therapist to perform RT Therapy Protocol Assessment initially then follow the protocol.  Repeat RT Therapy Protocol Assessment PRN for score 0-3 or on second treatment, BID, and PRN for scores above 3.    No Indications - adjust the frequency to every 6 hours PRN wheezing or bronchospasm, if no treatments needed after 48 hours then discontinue using Per Protocol order mode.     If indication present, adjust the RT bronchodilator orders based on the Bronchodilator Assessment Score as indicated below.  Use Inhaler orders unless patient has one or more of the following: on home nebulizer, not able to hold breath for 10 seconds, is not alert and oriented, cannot activate and use MDI correctly, or respiratory rate 25 breaths per minute or more, then use the equivalent nebulizer order(s) with same Frequency and PRN reasons based on the score.  If a patient is on this

## 2025-05-25 VITALS
HEIGHT: 65 IN | DIASTOLIC BLOOD PRESSURE: 76 MMHG | TEMPERATURE: 98 F | RESPIRATION RATE: 16 BRPM | WEIGHT: 172 LBS | SYSTOLIC BLOOD PRESSURE: 131 MMHG | OXYGEN SATURATION: 100 % | BODY MASS INDEX: 28.66 KG/M2 | HEART RATE: 87 BPM

## 2025-05-25 LAB
ALBUMIN SERPL-MCNC: 3 G/DL (ref 3.4–5)
ALBUMIN/GLOB SERPL: 1.1 {RATIO} (ref 1.1–2.2)
ALP SERPL-CCNC: 74 U/L (ref 40–129)
ALT SERPL-CCNC: 22 U/L (ref 10–40)
ANION GAP SERPL CALCULATED.3IONS-SCNC: 13 MMOL/L (ref 3–16)
AST SERPL-CCNC: 38 U/L (ref 15–37)
BASOPHILS # BLD: 0.1 K/UL (ref 0–0.2)
BASOPHILS NFR BLD: 0.9 %
BILIRUB SERPL-MCNC: 0.4 MG/DL (ref 0–1)
BUN SERPL-MCNC: 17 MG/DL (ref 7–20)
CALCIUM SERPL-MCNC: 8.3 MG/DL (ref 8.3–10.6)
CHLORIDE SERPL-SCNC: 103 MMOL/L (ref 99–110)
CO2 SERPL-SCNC: 23 MMOL/L (ref 21–32)
CREAT SERPL-MCNC: 0.9 MG/DL (ref 0.8–1.3)
DEPRECATED RDW RBC AUTO: 13.5 % (ref 12.4–15.4)
EOSINOPHIL # BLD: 0.2 K/UL (ref 0–0.6)
EOSINOPHIL NFR BLD: 4 %
GFR SERPLBLD CREATININE-BSD FMLA CKD-EPI: 86 ML/MIN/{1.73_M2}
GLUCOSE BLD-MCNC: 139 MG/DL (ref 70–99)
GLUCOSE SERPL-MCNC: 128 MG/DL (ref 70–99)
HCT VFR BLD AUTO: 40 % (ref 40.5–52.5)
HGB BLD-MCNC: 14 G/DL (ref 13.5–17.5)
LYMPHOCYTES # BLD: 0.4 K/UL (ref 1–5.1)
LYMPHOCYTES NFR BLD: 7.1 %
MCH RBC QN AUTO: 32.4 PG (ref 26–34)
MCHC RBC AUTO-ENTMCNC: 35 G/DL (ref 31–36)
MCV RBC AUTO: 92.4 FL (ref 80–100)
MONOCYTES # BLD: 0.6 K/UL (ref 0–1.3)
MONOCYTES NFR BLD: 10.4 %
NEUTROPHILS # BLD: 4.8 K/UL (ref 1.7–7.7)
NEUTROPHILS NFR BLD: 77.6 %
PERFORMED ON: ABNORMAL
PLATELET # BLD AUTO: 155 K/UL (ref 135–450)
PMV BLD AUTO: 7.8 FL (ref 5–10.5)
POTASSIUM SERPL-SCNC: 4.1 MMOL/L (ref 3.5–5.1)
PROT SERPL-MCNC: 5.7 G/DL (ref 6.4–8.2)
RBC # BLD AUTO: 4.33 M/UL (ref 4.2–5.9)
SODIUM SERPL-SCNC: 139 MMOL/L (ref 136–145)
WBC # BLD AUTO: 6.1 K/UL (ref 4–11)

## 2025-05-25 PROCEDURE — 6370000000 HC RX 637 (ALT 250 FOR IP): Performed by: INTERNAL MEDICINE

## 2025-05-25 PROCEDURE — 2500000003 HC RX 250 WO HCPCS: Performed by: NURSE PRACTITIONER

## 2025-05-25 PROCEDURE — 6370000000 HC RX 637 (ALT 250 FOR IP)

## 2025-05-25 PROCEDURE — 94669 MECHANICAL CHEST WALL OSCILL: CPT

## 2025-05-25 PROCEDURE — 94640 AIRWAY INHALATION TREATMENT: CPT

## 2025-05-25 PROCEDURE — 94761 N-INVAS EAR/PLS OXIMETRY MLT: CPT

## 2025-05-25 PROCEDURE — 80053 COMPREHEN METABOLIC PANEL: CPT

## 2025-05-25 PROCEDURE — 99239 HOSP IP/OBS DSCHRG MGMT >30: CPT | Performed by: INTERNAL MEDICINE

## 2025-05-25 PROCEDURE — 85025 COMPLETE CBC W/AUTO DIFF WBC: CPT

## 2025-05-25 PROCEDURE — 36415 COLL VENOUS BLD VENIPUNCTURE: CPT

## 2025-05-25 PROCEDURE — 2580000003 HC RX 258: Performed by: NURSE PRACTITIONER

## 2025-05-25 PROCEDURE — 6360000002 HC RX W HCPCS: Performed by: NURSE PRACTITIONER

## 2025-05-25 PROCEDURE — 6370000000 HC RX 637 (ALT 250 FOR IP): Performed by: NURSE PRACTITIONER

## 2025-05-25 RX ADMIN — GUAIFENESIN 600 MG: 600 TABLET, EXTENDED RELEASE ORAL at 08:57

## 2025-05-25 RX ADMIN — OXYCODONE HYDROCHLORIDE AND ACETAMINOPHEN 500 MG: 500 TABLET ORAL at 08:57

## 2025-05-25 RX ADMIN — ASPIRIN 81 MG: 81 TABLET, CHEWABLE ORAL at 08:57

## 2025-05-25 RX ADMIN — SODIUM CHLORIDE, PRESERVATIVE FREE 5 ML: 5 INJECTION INTRAVENOUS at 08:57

## 2025-05-25 RX ADMIN — SODIUM CHLORIDE 1000 MG: 9 INJECTION, SOLUTION INTRAVENOUS at 06:48

## 2025-05-25 RX ADMIN — IPRATROPIUM BROMIDE AND ALBUTEROL SULFATE 1 DOSE: 2.5; .5 SOLUTION RESPIRATORY (INHALATION) at 07:34

## 2025-05-25 RX ADMIN — AZITHROMYCIN DIHYDRATE 500 MG: 250 TABLET ORAL at 08:57

## 2025-05-25 RX ADMIN — PSYLLIUM HUSK 1 PACKET: 3.4 POWDER ORAL at 08:57

## 2025-05-25 RX ADMIN — Medication 1 TABLET: at 08:57

## 2025-05-25 RX ADMIN — Medication 400 MG: at 08:57

## 2025-05-25 RX ADMIN — ENOXAPARIN SODIUM 40 MG: 100 INJECTION SUBCUTANEOUS at 08:56

## 2025-05-25 RX ADMIN — ROSUVASTATIN CALCIUM 20 MG: 10 TABLET, FILM COATED ORAL at 08:57

## 2025-05-25 ASSESSMENT — PAIN SCALES - GENERAL: PAINLEVEL_OUTOF10: 0

## 2025-05-25 NOTE — PLAN OF CARE
Problem: Chronic Conditions and Co-morbidities  Goal: Patient's chronic conditions and co-morbidity symptoms are monitored and maintained or improved  5/24/2025 2035 by Brooklyn Rolle RN  Outcome: Progressing  5/24/2025 0806 by Shantel Sheffield RN  Outcome: Progressing     Problem: Discharge Planning  Goal: Discharge to home or other facility with appropriate resources  5/24/2025 2035 by Brooklyn Rolle RN  Outcome: Progressing  5/24/2025 0806 by Shantel Sheffield RN  Outcome: Progressing     Problem: Pain  Goal: Verbalizes/displays adequate comfort level or baseline comfort level  5/24/2025 2035 by Brooklyn Rolle RN  Outcome: Progressing  5/24/2025 0806 by Shantel Sheffield RN  Outcome: Progressing     Problem: Safety - Adult  Goal: Free from fall injury  5/24/2025 2035 by Brooklyn Rolle RN  Outcome: Progressing  5/24/2025 0806 by Shantel Sheffield RN  Outcome: Progressing     Problem: ABCDS Injury Assessment  Goal: Absence of physical injury  5/24/2025 2035 by Brooklyn Rolle RN  Outcome: Progressing  5/24/2025 0806 by Shantel Sheffield RN  Outcome: Progressing

## 2025-05-25 NOTE — PROGRESS NOTES
05/25/25 0700   RT Protocol   History Pulmonary Disease 1   Respiratory pattern 0   Breath sounds 2   Cough 1   Indications for Bronchodilator Therapy Decreased or absent breath sounds   Bronchodilator Assessment Score 4

## 2025-05-25 NOTE — PLAN OF CARE
Problem: Chronic Conditions and Co-morbidities  Goal: Patient's chronic conditions and co-morbidity symptoms are monitored and maintained or improved  5/25/2025 1033 by Shantel Sheffield RN  Outcome: Adequate for Discharge     Problem: Discharge Planning  Goal: Discharge to home or other facility with appropriate resources  5/25/2025 1033 by Shantel Sheffield RN  Outcome: Adequate for Discharge     Problem: Pain  Goal: Verbalizes/displays adequate comfort level or baseline comfort level  5/25/2025 1033 by Shantel Sheffield RN  Outcome: Adequate for Discharge     Problem: Safety - Adult  Goal: Free from fall injury  5/25/2025 1033 by Shantel Sheffield RN  Outcome: Adequate for Discharge     Problem: ABCDS Injury Assessment  Goal: Absence of physical injury  5/25/2025 1033 by Shantel Sheffield RN  Outcome: Adequate for Discharge

## 2025-05-25 NOTE — PLAN OF CARE
Problem: Chronic Conditions and Co-morbidities  Goal: Patient's chronic conditions and co-morbidity symptoms are monitored and maintained or improved  5/25/2025 0742 by Shantel Sheffield RN  Outcome: Progressing     Problem: Discharge Planning  Goal: Discharge to home or other facility with appropriate resources  5/25/2025 0742 by Shantel Sheffield RN  Outcome: Progressing     Problem: Pain  Goal: Verbalizes/displays adequate comfort level or baseline comfort level  5/25/2025 0742 by Shantel Sheffield RN  Outcome: Progressing     Problem: Safety - Adult  Goal: Free from fall injury  5/25/2025 0742 by Shantel Sheffield RN  Outcome: Progressing     Problem: ABCDS Injury Assessment  Goal: Absence of physical injury  5/25/2025 0742 by Shantel Sheffield RN  Outcome: Progressing

## 2025-05-25 NOTE — DISCHARGE SUMMARY
Discharge Summary     Date:5/25/2025        Patient Name:Guille Ribera     YOB: 1945     Age:79 y.o.    Admit Date:5/23/2025   Admission Condition:good   Discharged Condition:good  Discharge Date: 05/25/25 10:34 AM     Discharge Diagnoses   Principal Problem:    Pneumonia due to organism  Active Problems:    Primary hypertension    [facility] 9/19/22    Diabetes mellitus (HCC)    Bacterial pneumonia    Chronic combined systolic and diastolic heart failure (HCC)  Resolved Problems:    Acute respiratory failure with hypoxemia (HCC)      Hospital Stay   Narrative of Hospital Course:     79 y.o. male with pmhx of CHF, CAD, DM, HTN, lymphoma PPM who presented to [facility] ED with complaint of shortness of breath and inability to sleep. Patient reports he has not felt well for a couple days. Has been up all night coughing. Cough is productive with dark brown phlegm. No chest pain but has felt short of breath. Also had fever at home but does not remember what his temperature was. Has had chills and body aches. No vomiting or diarrhea. No urinary symptoms.   Former smoker.     Acute respiratory failure with hypoxemia precipitated by bacterial pneumonia -present on admission -resolved now              Patient presented with oxygen requirement of 2 L by nasal cannula, that has been weaned off after initiation of antibiotics              Currently saturating 94% in room air     Bacterial pneumonia of the left lower lobe -present on admission -community-acquired              Continue current antibiotics with azithromycin and Rocephin IV              Will add low-dose of daily prednisone              Continue incentive spirometer at bedside, I-S at the bedside too              Continue pulmonary toilet and as needed bronchodilators     History of coronary artery disease and ischemic cardiomyopathy              Home dose of aspirin  and statin has been continued                          Holding

## 2025-05-25 NOTE — DISCHARGE INSTRUCTIONS
Your information:  Name: Guille Ribera  : 1945    Your instructions:    Follow up with family doctor in 1 week.    What to do after you leave the hospital:    Recommended diet: diabetic diet    Recommended activity: activity as tolerated        The following personal items were collected during your admission and were returned to you:    Belongings  Dental Appliances: At home  Vision - Corrective Lenses: Eyeglasses  Hearing Aid: Bilateral hearing aids  Clothing: Footwear, Pajamas, Pants, Hat, Shirt, Shorts, Undergarments, Socks  Jewelry: None  Body Piercings Removed: N/A  Electronic Devices: Cell Phone,   Weapons (Notify Protective Services/Security): None  Other Valuables: Other (Comment) (NA)  Home Medications: None  Valuables Given To: Other (Comment) (n/a)  Provide Name(s) of Who Valuable(s) Were Given To: n/a  Responsible person(s) in the waiting room: n/a  Patient approves for provider to speak to responsible person post operatively:  (n/a)    Information obtained by:  By signing below, I understand that if any problems occur once I leave the hospital I am to contact family doctor.  I understand and acknowledge receipt of the instructions indicated above.

## 2025-05-26 LAB — BACTERIA SPEC RESP CULT: NORMAL

## 2025-05-27 LAB
BACTERIA BLD CULT ORG #2: NORMAL
BACTERIA BLD CULT: NORMAL

## 2025-08-30 ENCOUNTER — OFFICE VISIT (OUTPATIENT)
Dept: URGENT CARE | Age: 80
End: 2025-08-30

## 2025-08-30 VITALS
BODY MASS INDEX: 30.45 KG/M2 | WEIGHT: 183 LBS | DIASTOLIC BLOOD PRESSURE: 74 MMHG | SYSTOLIC BLOOD PRESSURE: 129 MMHG | RESPIRATION RATE: 16 BRPM | HEART RATE: 75 BPM | OXYGEN SATURATION: 94 %

## 2025-08-30 DIAGNOSIS — R09.82 POSTNASAL DRIP: ICD-10-CM

## 2025-08-30 DIAGNOSIS — J06.9 VIRAL URI: ICD-10-CM

## 2025-08-30 DIAGNOSIS — J44.1 COPD WITH ACUTE EXACERBATION (HCC): Primary | ICD-10-CM

## 2025-08-30 DIAGNOSIS — J34.89 RHINORRHEA: ICD-10-CM

## 2025-08-30 DIAGNOSIS — R09.81 NASAL CONGESTION: ICD-10-CM

## 2025-08-30 DIAGNOSIS — R05.1 ACUTE COUGH: ICD-10-CM

## 2025-08-30 PROBLEM — H35.3122 NONEXUDATIVE AGE-RELATED MACULAR DEGENERATION, LEFT EYE, INTERMEDIATE DRY STAGE: Status: ACTIVE | Noted: 2025-06-16

## 2025-08-30 PROBLEM — I50.32 CHRONIC DIASTOLIC (CONGESTIVE) HEART FAILURE (HCC): Status: ACTIVE | Noted: 2018-11-06

## 2025-08-30 PROBLEM — H90.3 SENSORINEURAL HEARING LOSS (SNHL) OF BOTH EARS: Status: ACTIVE | Noted: 2025-08-30

## 2025-08-30 PROBLEM — Z95.5 STENTED CORONARY ARTERY: Status: ACTIVE | Noted: 2025-08-30

## 2025-08-30 PROBLEM — D69.6 THROMBOCYTOPENIA: Status: ACTIVE | Noted: 2024-12-27

## 2025-08-30 PROBLEM — I11.9 BENIGN HYPERTENSIVE HEART DISEASE WITHOUT CONGESTIVE HEART FAILURE: Status: ACTIVE | Noted: 2025-08-30

## 2025-08-30 PROBLEM — B35.1 ONYCHOMYCOSIS OF TOENAIL: Status: ACTIVE | Noted: 2025-08-30

## 2025-08-30 PROBLEM — E87.1 HYPONATREMIA: Status: ACTIVE | Noted: 2024-12-26

## 2025-08-30 PROBLEM — J15.9 BACTERIAL PNEUMONIA: Status: ACTIVE | Noted: 2024-12-26

## 2025-08-30 PROBLEM — E11.42 TYPE 2 DIABETES MELLITUS WITH DIABETIC POLYNEUROPATHY (HCC): Status: ACTIVE | Noted: 2025-08-30

## 2025-08-30 PROBLEM — M79.676 PAIN IN TOE: Status: RESOLVED | Noted: 2025-08-30 | Resolved: 2025-08-30

## 2025-08-30 PROBLEM — M19.90 OSTEOARTHRITIS: Status: ACTIVE | Noted: 2025-08-30

## 2025-08-30 PROBLEM — M79.609 PAIN IN EXTREMITY: Status: RESOLVED | Noted: 2025-08-30 | Resolved: 2025-08-30

## 2025-08-30 PROBLEM — H25.13 AGE-RELATED NUCLEAR CATARACT OF BOTH EYES: Status: ACTIVE | Noted: 2025-08-30

## 2025-08-30 PROBLEM — Z71.9 COUNSELING, UNSPECIFIED: Status: ACTIVE | Noted: 2025-08-30

## 2025-08-30 PROBLEM — E11.51 TYPE 2 DIABETES MELLITUS WITH DIABETIC PERIPHERAL ANGIOPATHY WITHOUT GANGRENE (HCC): Status: ACTIVE | Noted: 2025-08-30

## 2025-08-30 PROBLEM — H43.813 VITREOUS DEGENERATION OF BOTH EYES: Status: ACTIVE | Noted: 2025-08-30

## 2025-08-30 PROBLEM — I25.9 CHRONIC ISCHEMIC HEART DISEASE: Status: ACTIVE | Noted: 2025-08-30

## 2025-08-30 PROBLEM — I25.5 ISCHEMIC CARDIOMYOPATHY: Chronic | Status: ACTIVE | Noted: 2024-09-30

## 2025-08-30 PROBLEM — K82.9 DISORDER OF GALLBLADDER: Status: ACTIVE | Noted: 2025-08-30

## 2025-08-30 PROBLEM — Z46.1 ENCOUNTER FOR FITTING AND ADJUSTMENT OF HEARING AID: Status: RESOLVED | Noted: 2025-08-30 | Resolved: 2025-08-30

## 2025-08-30 PROBLEM — J01.00 ACUTE MAXILLARY SINUSITIS: Status: RESOLVED | Noted: 2025-08-30 | Resolved: 2025-08-30

## 2025-08-30 PROBLEM — H35.3230: Status: ACTIVE | Noted: 2025-08-30

## 2025-08-30 PROBLEM — L85.1 ACQUIRED KERATODERMA: Status: ACTIVE | Noted: 2025-08-30

## 2025-08-30 PROBLEM — L85.3 ASTEATOSIS CUTIS: Status: ACTIVE | Noted: 2025-08-30

## 2025-08-30 PROBLEM — M17.2 BILATERAL POST-TRAUMATIC OSTEOARTHRITIS OF KNEE: Status: ACTIVE | Noted: 2025-08-30

## 2025-08-30 PROBLEM — R53.1 GENERALIZED WEAKNESS: Status: RESOLVED | Noted: 2024-12-26 | Resolved: 2025-08-30

## 2025-08-30 RX ORDER — ALBUTEROL SULFATE 90 UG/1
2 INHALANT RESPIRATORY (INHALATION) EVERY 6 HOURS PRN
COMMUNITY
Start: 2024-12-30

## 2025-08-30 RX ORDER — FINASTERIDE 5 MG/1
5 TABLET, FILM COATED ORAL
COMMUNITY
Start: 2025-06-16

## 2025-08-30 RX ORDER — BENZONATATE 200 MG/1
200 CAPSULE ORAL 3 TIMES DAILY PRN
Qty: 30 CAPSULE | Refills: 0 | Status: SHIPPED | OUTPATIENT
Start: 2025-08-30 | End: 2025-09-09

## 2025-08-30 RX ORDER — UREA 200 MG/G
CREAM TOPICAL
COMMUNITY
Start: 2025-07-10

## 2025-08-30 RX ORDER — VALSARTAN 80 MG/1
80 TABLET ORAL NIGHTLY
COMMUNITY
Start: 2024-09-30

## 2025-08-30 RX ORDER — BROMPHENIRAMINE MALEATE, PSEUDOEPHEDRINE HYDROCHLORIDE, AND DEXTROMETHORPHAN HYDROBROMIDE 2; 30; 10 MG/5ML; MG/5ML; MG/5ML
10 SYRUP ORAL 4 TIMES DAILY PRN
Qty: 200 ML | Refills: 1 | Status: SHIPPED | OUTPATIENT
Start: 2025-08-30

## 2025-08-30 RX ORDER — NYSTATIN 100000 [USP'U]/ML
500000 SUSPENSION ORAL 4 TIMES DAILY
COMMUNITY
Start: 2024-11-12

## 2025-08-30 RX ORDER — SODIUM PHOSPHATE,MONO-DIBASIC 19G-7G/118
1 ENEMA (ML) RECTAL 2 TIMES DAILY
COMMUNITY

## 2025-08-30 ASSESSMENT — ENCOUNTER SYMPTOMS
WHEEZING: 0
DIARRHEA: 0
CHEST TIGHTNESS: 1
SORE THROAT: 1
VOMITING: 0
SHORTNESS OF BREATH: 0
COUGH: 1
RHINORRHEA: 1
VOICE CHANGE: 0
ABDOMINAL PAIN: 0
SINUS PAIN: 0
NAUSEA: 0
SINUS PRESSURE: 0

## 2025-08-30 ASSESSMENT — VISUAL ACUITY: OU: 1

## (undated) DEVICE — ELECTRODE PT RET AD L9FT HI MOIST COND ADH HYDRGEL CORDED

## (undated) DEVICE — FIAPC® PROBE W/ FILTER 2200 A OD 2.3MM/6.9FR; L 2.2M/7.2FT: Brand: ERBE

## (undated) DEVICE — ENDO CARRY-ON PROCEDURE KIT INCLUDES SUCTION TUBING, LUBRICANT, GAUZE, BIOHAZARD STICKER, TRANSPORT PAD AND INTERCEPT BEDSIDE KIT.: Brand: ENDO CARRY-ON PROCEDURE KIT

## (undated) DEVICE — ELECTRODE,RADIOTRANSLUCENT,FOAM,3PK: Brand: MEDLINE